# Patient Record
Sex: FEMALE | Race: WHITE | NOT HISPANIC OR LATINO | Employment: FULL TIME | URBAN - METROPOLITAN AREA
[De-identification: names, ages, dates, MRNs, and addresses within clinical notes are randomized per-mention and may not be internally consistent; named-entity substitution may affect disease eponyms.]

---

## 2017-01-11 ENCOUNTER — OFFICE VISIT (OUTPATIENT)
Dept: URGENT CARE | Facility: RETAIL CLINIC | Age: 32
End: 2017-01-11
Payer: COMMERCIAL

## 2017-01-11 VITALS — TEMPERATURE: 99 F | DIASTOLIC BLOOD PRESSURE: 88 MMHG | SYSTOLIC BLOOD PRESSURE: 137 MMHG | HEART RATE: 73 BPM

## 2017-01-11 DIAGNOSIS — H69.92 DYSFUNCTION OF EUSTACHIAN TUBE, LEFT: Primary | ICD-10-CM

## 2017-01-11 PROCEDURE — 99202 OFFICE O/P NEW SF 15 MIN: CPT | Performed by: PHYSICIAN ASSISTANT

## 2017-01-11 NOTE — PATIENT INSTRUCTIONS
At pharmacy ask for Generic Sudafed red tablets (decongestant) for congestion.  Start antihistamine daily (zyrtec or claritin)  Apply warm compresses/packs for 15 minutes daily.  Steam treatments or humidifier.  Tylenol or ibuprofen as needed for pain or fever  Please follow up with primary care provider if not improving, worsening or new symptoms

## 2017-01-11 NOTE — PROGRESS NOTES
Chief Complaint   Patient presents with     Otalgia     left ear pain x 3-4 days, no fevers        SUBJECTIVE:  Mary Oakley is a 31 year old female who presents with left ear pain for 3-4 day(s).   Severity: mild   Timing:still present  Additional symptoms include cold sxs, but resolved few weeks ago  History of recurrent otitis: no    Past Medical History   Diagnosis Date     Scoliosis      Lymphoid hyperplasia      Colon     Breast mass      RT  - Fibrocystic - 10 O'clock     Current Outpatient Prescriptions   Medication Sig Dispense Refill     tretinoin (RETIN-A) 0.025 % cream Apply topically At Bedtime Use every night 45 g 11     norgestim-eth estrad triphasic (TRINESSA, 28,) 0.18/0.215/0.25 MG-35 MCG per tablet Take 1 tablet by mouth daily 84 tablet 3      No Known Allergies     History   Smoking status     Never Smoker    Smokeless tobacco     Never Used       ROS:   Review of systems negative except as stated above.    OBJECTIVE:  /88 mmHg  Pulse 73  Temp(Src) 99  F (37.2  C) (Oral)  The right TM is normal: no effusions, no erythema, and normal landmarks     The right auditory canal is normal and without drainage, edema or erythema  The left TM is air/fluid interface  The left auditory canal is normal and without drainage, edema or erythema  Oropharynx exam is normal: no lesions, erythema, adenopathy or exudate.  GENERAL: no acute distress  EYES: conjunctiva clear  NECK: supple, non-tender to palpation, no adenopathy noted    ASSESSMENT:  Dysfunction of eustachian tube, left [H69.82]    PLAN:  At pharmacy, Generic Sudafed red tablets (decongestant) for middle ear pressure/fluid.  Start antihistamine daily (zyrtec or claritin)  Apply warm compresses/packs for 15 minutes daily.  Steam treatments or humidifier.  Tylenol or ibuprofen as needed for pain or fever  Please follow up with primary care provider if not improving, worsening or new symptoms      Shanae Nazario PA-C  [facility]

## 2017-01-11 NOTE — MR AVS SNAPSHOT
After Visit Summary   1/11/2017    Mary Oakley    MRN: [id number]           Patient Information     Date Of Birth          1985        Visit Information        Provider Department      1/11/2017 5:10 PM Shanae Nazario PA-C [facility]        Today's Diagnoses     Dysfunction of eustachian tube, left    -  1       Care Instructions    At pharmacy ask for Generic Sudafed red tablets (decongestant) for congestion.  Start antihistamine daily (zyrtec or claritin)  Apply warm compresses/packs for 15 minutes daily.  Steam treatments or humidifier.  Tylenol or ibuprofen as needed for pain or fever  Please follow up with primary care provider if not improving, worsening or new symptoms          Follow-ups after your visit        Your next 10 appointments already scheduled     Feb 20, 2017  3:45 PM   Return Visit with Rosalina Hirsch MD   [facility] ([facility]    [address] [place] [id number] [phone]              Who to contact     You can reach your care team any time of the day by calling [phone].  Notification of test results:  If you have an abnormal lab result, we will notify you by phone as soon as possible.         Additional Information About Your Visit        MyChart Information     [facility] gives you secure access to your electronic health record. If you see a primary care provider, you can also send messages to your care team and make appointments. If you have questions, please call your primary care clinic.  If you do not have a primary care provider, please call [phone] and they will assist you.        Care EveryWhere ID     This is your Care EveryWhere ID. This could be used by other organizations to access your [place] medical records  [id number]        Your Vitals Were     Pulse Temperature                73 99  F (37.2  C) (Oral)           Blood Pressure from Last 3 Encounters:    01/11/17 137/88   05/09/16 112/76   03/18/16 136/84    Weight from Last 3 Encounters:   05/09/16 152 lb (68.947 kg)   03/18/16 186 lb (84.369 kg)   03/11/16 186 lb (84.369 kg)              Today, you had the following     No orders found for display       Primary Care Provider    None Specified       No primary provider on file.        Thank you!     Thank you for choosing [facility]  for your care. Our goal is always to provide you with excellent care. Hearing back from our patients is one way we can continue to improve our services. Please take a few minutes to complete the written survey that you may receive in the mail after your visit with us. Thank you!             Your Updated Medication List - Protect others around you: Learn how to safely use, store and throw away your medicines at www.disposemymeds.org.          This list is accurate as of: 1/11/17  5:32 PM.  Always use your most recent med list.                   Brand Name Dispense Instructions for use    norgestim-eth estrad triphasic 0.18/0.215/0.25 MG-35 MCG per tablet    TRINESSA (28)    84 tablet    Take 1 tablet by mouth daily       tretinoin 0.025 % cream    RETIN-A    45 g    Apply topically At Bedtime Use every night

## 2017-01-19 NOTE — PROGRESS NOTES
"  SUBJECTIVE:                                                    Mary Oakley is a 31 year old female who presents to clinic today for the following health issues:  {Provider please address medication reconciliation discrepancies--rooming staff please delete if no med/rec issues}    HPI    {additional problems for roomer to add, delete if none:555931}    Problem list and histories reviewed & adjusted, as indicated.  Additional history: {NONE - AS DOCUMENTED:371880::\"as documented\"}    {ACUTE Problem SUPERLIST - brief histories:426837}    {HIST REVIEW/ LINKS 2:529620}    {PROVIDER CHARTING PREFERENCE:524881}  "

## 2017-01-19 NOTE — PROGRESS NOTES
SUBJECTIVE:     CC: Mary Oakley is an 31 year old woman who presents for preventive health visit.     Physical  Annual:     Getting at least 3 servings of Calcium per day::  Yes    Bi-annual eye exam::  NO    Dental care twice a year::  NO    Sleep apnea or symptoms of sleep apnea::  None    Diet::  Regular (no restrictions)    Frequency of exercise::  2-3 days/week    Duration of exercise::  15-30 minutes    Taking medications regularly::  Yes    Medication side effects::  None    Additional concerns today::  YES (anxiety, pain in armpits )        -------------------------------------    Today's PHQ-2 Score:   PHQ-2 ( 1999 Pfizer) 1/13/2016   Q1: Little interest or pleasure in doing things 0   Q2: Feeling down, depressed or hopeless 0   PHQ-2 Score 0       Abuse: Current or Past(Physical, Sexual or Emotional)- No  Do you feel safe in your environment - Yes    Social History   Substance Use Topics     Smoking status: Never Smoker      Smokeless tobacco: Never Used     Alcohol Use: Yes      Comment: Social Drinker     The patient does not drink >3 drinks per day nor >7 drinks per week.    Recent Labs   Lab Test  07/20/15   0901   CHOL  165   HDL  71   LDL  83   TRIG  53   CHOLHDLRATIO  2.3       Reviewed orders with patient.  Reviewed health maintenance and updated orders accordingly - Yes    Mammo Decision Support:  Mammogram not appropriate for this patient based on age.    Pertinent mammograms are reviewed under the imaging tab.  History of abnormal Pap smear: NO - age 30- 65 PAP every 3 years recommended  All Histories reviewed and updated in Epic.    Past Medical History   Diagnosis Date     Scoliosis      Lymphoid hyperplasia      Colon     Breast mass      RT  - Fibrocystic - 10 O'clock      Past Surgical History   Procedure Laterality Date     Colonoscopy  4/02     C nonspecific procedure  as Infant     Trigger Thumb Release     Colonoscopy  4/22/2013     Procedure: COLONOSCOPY;  colonoscopy;  Surgeon:  Moshe Tamez MD;  Location:  GI       ROS:  C: NEGATIVE for fever, chills, change in weight  I: NEGATIVE for worrisome rashes, moles or lesions  E: NEGATIVE for vision changes or irritation  ENT: NEGATIVE for ear, mouth and throat problems  R: NEGATIVE for significant cough or SOB  B: NEGATIVE for masses, tenderness or discharge  CV: NEGATIVE for chest pain, palpitations or peripheral edema  GI: NEGATIVE for nausea, abdominal pain, heartburn, or change in bowel habits  : NEGATIVE for unusual urinary or vaginal symptoms. Periods are regular.  M: NEGATIVE for significant arthralgias or myalgia  N: NEGATIVE for weakness, dizziness or paresthesias  P: NEGATIVE for changes in mood or affect    Problem list, Medication list, Allergies, and Medical/Social/Surgical histories reviewed in [facility] and updated as appropriate.  Labs reviewed in EPIC  BP Readings from Last 3 Encounters:   17 120/60   17 137/88   16 112/76    Wt Readings from Last 3 Encounters:   17 144 lb (65.318 kg)   16 152 lb (68.947 kg)   16 186 lb (84.369 kg)                  Patient Active Problem List   Diagnosis     History of  section     MARIAELENA (generalized anxiety disorder)     Past Surgical History   Procedure Laterality Date     Colonoscopy       C nonspecific procedure  as Infant     Trigger Thumb Release     Colonoscopy  2013     Procedure: COLONOSCOPY;  colonoscopy;  Surgeon: Moshe Tamez MD;  Location:  GI       Social History   Substance Use Topics     Smoking status: Never Smoker      Smokeless tobacco: Never Used     Alcohol Use: Yes      Comment: Social Drinker     Family History   Problem Relation Age of Onset     HEART DISEASE Paternal Grandmother      Cardiovascular Paternal Grandmother      CANCER Paternal Grandmother      skin cancer     Alzheimer Disease Paternal Grandfather      Asthma Brother      Asthma Brother          Current Outpatient Prescriptions   Medication Sig Dispense  "Refill     norgestim-eth estrad triphasic (TRINESSA, 28,) 0.18/0.215/0.25 MG-35 MCG per tablet Take 1 tablet by mouth daily 84 tablet 3     sertraline (ZOLOFT) 50 MG tablet Take 1/2 tablet (25 mg) for 1-2 weeks, then increase to 1 tablet orally daily 30 tablet 1     tretinoin (RETIN-A) 0.025 % cream Apply topically At Bedtime Use every night 45 g 11     [DISCONTINUED] norgestim-eth estrad triphasic (TRINESSA, 28,) 0.18/0.215/0.25 MG-35 MCG per tablet Take 1 tablet by mouth daily 84 tablet 3     No Known Allergies  OBJECTIVE:     There were no vitals taken for this visit.   /60 mmHg  Pulse 80  Temp(Src) 98.5  F (36.9  C) (Temporal)  Resp 16  Ht 5' 7.79\" (1.722 m)  Wt 144 lb (65.318 kg)  BMI 22.03 kg/m2  LMP 01/17/2017    Physical Exam   Constitutional: She is oriented to person, place, and time. She appears well-developed and well-nourished.   HENT:   Head: Normocephalic and atraumatic.   Right Ear: External ear normal.   Left Ear: External ear normal.   Mouth/Throat: Oropharynx is clear and moist.   Eyes: EOM are normal.   Neck: Neck supple.   Cardiovascular: Normal rate and regular rhythm.    Pulmonary/Chest: Effort normal and breath sounds normal.   Abdominal: Soft. Bowel sounds are normal.   Genitourinary: Vagina normal.   Musculoskeletal: Normal range of motion.   Neurological: She is alert and oriented to person, place, and time.   Psychiatric: She has a normal mood and affect.         ASSESSMENT/PLAN:       Problem List Items Addressed This Visit        SPECIALTY PROBLEM LIST    Encounter for initial prescription of contraceptive pills - Primary     She does not have any risk factors to use oral contraceptive pills  Denies any side effects from the medication.  Continue OC pills as prescribed             Relevant Medications    norgestim-eth estrad triphasic (TRINESSA, 28,) 0.18/0.215/0.25 MG-35 MCG per tablet       Other    MARIAELENA (generalized anxiety disorder)     Discussed medication management " "versus therapy.  She would like to try medications.  Trial low-dose of Zoloft.  Recheck in 6-8 weeks.  Advised to continue physical activity to help improve her symptoms           Relevant Medications    sertraline (ZOLOFT) 50 MG tablet      Other Visit Diagnoses     Screening for cervical cancer         Relevant Orders     Pap imaged thin layer screen with HPV - recommended age 30 - 65 years (select HPV order below) (Completed)     HPV High Risk Types DNA Cervical (Completed)     Routine general medical examination at a health care facility         Relevant Orders     Lipid Profile with reflex to direct LDL     Glucose             COUNSELING:  Reviewed preventive health counseling, as reflected in patient instructions       Regular exercise       Healthy diet/nutrition       Vision screening       Hearing screening         reports that she has never smoked. She has never used smokeless tobacco.    Estimated body mass index is 23.44 kg/(m^2) as calculated from the following:    Height as of 1/13/16: 5' 7.5\" (1.715 m).    Weight as of 5/9/16: 152 lb (68.947 kg).       Counseling Resources:  ATP IV Guidelines  Pooled Cohorts Equation Calculator  Breast Cancer Risk Calculator  FRAX Risk Assessment  ICSI Preventive Guidelines  Dietary Guidelines for Americans, 2010  USDA's MyPlate  ASA Prophylaxis  Lung CA Screening    Mildred Mckeon MD  [facility]    "

## 2017-01-24 ENCOUNTER — OFFICE VISIT (OUTPATIENT)
Dept: FAMILY MEDICINE | Facility: OTHER | Age: 32
End: 2017-01-24
Payer: COMMERCIAL

## 2017-01-24 VITALS
DIASTOLIC BLOOD PRESSURE: 60 MMHG | HEART RATE: 80 BPM | RESPIRATION RATE: 16 BRPM | WEIGHT: 144 LBS | SYSTOLIC BLOOD PRESSURE: 120 MMHG | TEMPERATURE: 98.5 F | BODY MASS INDEX: 21.82 KG/M2 | HEIGHT: 68 IN

## 2017-01-24 DIAGNOSIS — F41.1 GAD (GENERALIZED ANXIETY DISORDER): ICD-10-CM

## 2017-01-24 DIAGNOSIS — Z12.4 SCREENING FOR CERVICAL CANCER: ICD-10-CM

## 2017-01-24 DIAGNOSIS — Z00.00 ROUTINE GENERAL MEDICAL EXAMINATION AT A HEALTH CARE FACILITY: ICD-10-CM

## 2017-01-24 DIAGNOSIS — Z30.011 ENCOUNTER FOR INITIAL PRESCRIPTION OF CONTRACEPTIVE PILLS: Primary | ICD-10-CM

## 2017-01-24 PROCEDURE — 87624 HPV HI-RISK TYP POOLED RSLT: CPT | Performed by: FAMILY MEDICINE

## 2017-01-24 PROCEDURE — G0145 SCR C/V CYTO,THINLAYER,RESCR: HCPCS | Performed by: FAMILY MEDICINE

## 2017-01-24 PROCEDURE — 99395 PREV VISIT EST AGE 18-39: CPT | Performed by: FAMILY MEDICINE

## 2017-01-24 RX ORDER — NORGESTIMATE AND ETHINYL ESTRADIOL 7DAYSX3 28
1 KIT ORAL DAILY
Qty: 84 TABLET | Refills: 3 | Status: SHIPPED | OUTPATIENT
Start: 2017-01-24 | End: 2018-01-07

## 2017-01-24 ASSESSMENT — PAIN SCALES - GENERAL: PAINLEVEL: NO PAIN (0)

## 2017-01-24 NOTE — NURSING NOTE
"Chief Complaint   Patient presents with     Physical     Panel Management     height       Initial /60 mmHg  Pulse 80  Temp(Src) 98.5  F (36.9  C) (Temporal)  Resp 16  Ht 5' 7.79\" (1.722 m)  Wt 144 lb (65.318 kg)  BMI 22.03 kg/m2  LMP 01/17/2017 Estimated body mass index is 22.03 kg/(m^2) as calculated from the following:    Height as of this encounter: 5' 7.8\" (1.722 m).    Weight as of this encounter: 144 lb (65.318 kg).  BP completed using cuff size: regular  Beba Daniels CMA    "

## 2017-01-24 NOTE — ASSESSMENT & PLAN NOTE
She does not have any risk factors to use oral contraceptive pills  Denies any side effects from the medication.  Continue OC pills as prescribed

## 2017-01-24 NOTE — ASSESSMENT & PLAN NOTE
Discussed medication management versus therapy.  She would like to try medications.  Trial low-dose of Zoloft.  Recheck in 6-8 weeks.  Advised to continue physical activity to help improve her symptoms

## 2017-01-24 NOTE — MR AVS SNAPSHOT
After Visit Summary   1/24/2017    Mary Oakley    MRN: [id number]           Patient Information     Date Of Birth          1985        Visit Information        Provider Department      1/24/2017 7:40 AM Mildred Mckeon MD [facility]        Today's Diagnoses     Encounter for initial prescription of contraceptive pills    -  1     MARIAELENA (generalized anxiety disorder)         Screening for cervical cancer         Routine general medical examination at a health care facility           Care Instructions      Preventive Health Recommendations  Female Ages 26 - 39  Yearly exam:   See your health care provider every year in order to    Review health changes.     Discuss preventive care.      Review your medicines if you your doctor has prescribed any.    Until age 30: Get a Pap test every three years (more often if you have had an abnormal result).    After age 30: Talk to your doctor about whether you should have a Pap test every 3 years or have a Pap test with HPV screening every 5 years.   You do not need a Pap test if your uterus was removed (hysterectomy) and you have not had cancer.  You should be tested each year for STDs (sexually transmitted diseases), if you're at risk.   Talk to your provider about how often to have your cholesterol checked.  If you are at risk for diabetes, you should have a diabetes test (fasting glucose).  Shots: Get a flu shot each year. Get a tetanus shot every 10 years.   Nutrition:     Eat at least 5 servings of fruits and vegetables each day.    Eat whole-grain bread, whole-wheat pasta and brown rice instead of white grains and rice.    Talk to your provider about Calcium and Vitamin D.     Lifestyle    Exercise at least 150 minutes a week (30 minutes a day, 5 days of the week). This will help you control your weight and prevent disease.    Limit alcohol to one drink per day.    No smoking.     Wear sunscreen to prevent skin cancer.    See your  dentist every six months for an exam and cleaning.            Follow-ups after your visit        Follow-up notes from your care team     Return in about 2 months (around 3/24/2017).      Your next 10 appointments already scheduled     Feb 20, 2017  3:45 PM   [facility]    [address] [place] [id number] [phone]              Future tests that were ordered for you today     Open Future Orders        Priority Expected Expires Ordered    Lipid Profile with reflex to direct LDL Routine  6/24/2017 1/24/2017    Glucose Routine  6/24/2017 1/24/2017            Who to contact     If you have questions or need follow up information about today's clinic visit or your schedule please contact [facility] directly at [phone].  Normal or non-critical lab and imaging results will be communicated to you by MyChart, letter or phone within 4 business days after the clinic has received the results. If you do not hear from us within 7 days, please contact the clinic through [facility] or phone. If you have a critical or abnormal lab result, we will notify you by phone as soon as possible.  Submit refill requests through [facility] or call your pharmacy and they will forward the refill request to us. Please allow 3 business days for your refill to be completed.          Additional Information About Your Visit        MyChart Information     [facility] gives you secure access to your electronic health record. If you see a primary care provider, you can also send messages to your care team and make appointments. If you have questions, please call your primary care clinic.  If you do not have a primary care provider, please call [phone] and they will assist you.        Care EveryWhere ID     This is your Care EveryWhere ID. This could be used by other organizations to access your [facility]  "records  [id number]        Your Vitals Were     Pulse Temperature Respirations Height BMI (Body Mass Index) Last Period    80 98.5  F (36.9  C) (Temporal) 16 5' 7.79\" (1.722 m) 22.03 kg/m2 01/17/2017       Blood Pressure from Last 3 Encounters:   01/24/17 120/60   01/11/17 137/88   05/09/16 112/76    Weight from Last 3 Encounters:   01/24/17 144 lb (65.318 kg)   05/09/16 152 lb (68.947 kg)   03/18/16 186 lb (84.369 kg)              We Performed the Following     HPV High Risk Types DNA Cervical     Pap imaged thin layer screen with HPV - recommended age 30 - 65 years (select HPV order below)          Today's Medication Changes          These changes are accurate as of: 1/24/17  8:28 AM.  If you have any questions, ask your nurse or doctor.               Start taking these medicines.        Dose/Directions    sertraline 50 MG tablet   Commonly known as:  ZOLOFT   Used for:  MARIAELENA (generalized anxiety disorder)   Started by:  Mildred Mckeon MD        Take 1/2 tablet (25 mg) for 1-2 weeks, then increase to 1 tablet orally daily   Quantity:  30 tablet   Refills:  1            Where to get your medicines      These medications were sent to [facility] [address] [facility] [address] [place] [facility] [address]  [address] [address] [address]     Phone:  [phone]    - norgestim-eth estrad triphasic 0.18/0.215/0.25 MG-35 MCG per tablet  - sertraline 50 MG tablet             Primary Care Provider    None Specified       No primary provider on file.        Thank you!     Thank you for choosing [facility]  for your care. Our goal is always to provide you with excellent care. Hearing back from our patients is one way we can continue to improve our services. Please take a few minutes to complete the written survey that you may receive in the mail after your visit with us. Thank you!             Your Updated Medication List - Protect others around you: Learn how to safely use, " store and throw away your medicines at www.disposemymeds.org.          This list is accurate as of: 1/24/17  8:28 AM.  Always use your most recent med list.                   Brand Name Dispense Instructions for use    norgestim-eth estrad triphasic 0.18/0.215/0.25 MG-35 MCG per tablet    TRINESSA (28)    84 tablet    Take 1 tablet by mouth daily       sertraline 50 MG tablet    ZOLOFT    30 tablet    Take 1/2 tablet (25 mg) for 1-2 weeks, then increase to 1 tablet orally daily       tretinoin 0.025 % cream    RETIN-A    45 g    Apply topically At Bedtime Use every night

## 2017-01-26 LAB
COPATH REPORT: NORMAL
PAP: NORMAL

## 2017-01-27 LAB
FINAL DIAGNOSIS: NORMAL
HPV HR 12 DNA CVX QL NAA+PROBE: NEGATIVE
HPV16 DNA SPEC QL NAA+PROBE: NEGATIVE
HPV18 DNA SPEC QL NAA+PROBE: NEGATIVE
SPECIMEN DESCRIPTION: NORMAL

## 2017-02-06 ENCOUNTER — MYC MEDICAL ADVICE (OUTPATIENT)
Dept: FAMILY MEDICINE | Facility: OTHER | Age: 32
End: 2017-02-06

## 2017-02-20 ENCOUNTER — OFFICE VISIT (OUTPATIENT)
Dept: DERMATOLOGY | Facility: CLINIC | Age: 32
End: 2017-02-20
Payer: COMMERCIAL

## 2017-02-20 DIAGNOSIS — R22.9 SUBCUTANEOUS NODULE: Primary | ICD-10-CM

## 2017-02-20 PROCEDURE — 11100 HC BIOPSY SKIN/SUBQ/MUC MEM, SINGLE LESION: CPT | Mod: GC | Performed by: DERMATOLOGY

## 2017-02-20 PROCEDURE — 88304 TISSUE EXAM BY PATHOLOGIST: CPT | Performed by: DERMATOLOGY

## 2017-02-20 NOTE — MR AVS SNAPSHOT
After Visit Summary   2/20/2017    Mary Oakley    MRN: [id number]           Patient Information     Date Of Birth          1985        Visit Information        Provider Department      2/20/2017 3:45 PM Rosalina Hirsch MD [facility]        Today's Diagnoses     Subcutaneous nodule    -  1      Care Instructions    Wound Care After a Biopsy    What is a skin biopsy?  A skin biopsy allows the doctor to examine a very small piece of tissue under the microscope to determine the diagnosis and the best treatment for the skin condition. A local anesthetic (numbing medicine)  is injected with a very small needle into the skin area to be tested. A small piece of skin is taken from the area. Sometimes a suture (stitch) is used.     What are the risks of a skin biopsy?  I will experience scar, bleeding, swelling, pain, crusting and redness. I may experience incomplete removal or recurrence. Risks of this procedure are excessive bleeding, bruising, infection, nerve damage, numbness, thick (hypertrophic or keloidal) scar and non-diagnostic biopsy.    How should I care for my wound for the first 24 hours?    Keep the wound dry and covered for 24 hours    If it bleeds, hold direct pressure on the area for 15 minutes. If bleeding does not stop then go to the emergency room    Avoid strenuous exercise the first 1-2 days or as your doctor instructs you    How should I care for the wound after 24 hours?    After 24 hours, remove the bandage    Leave Steri-strips on as long as they stick, you may trim any fraying ends    You may bathe or shower as normal    If steri-strips come off before 7 days:  o Clean the wound twice a day with gentle soap and water  o Do not scrub, be gentle  o Apply white petroleum/Vaseline after cleaning the wound with a cotton swab or a clean finger, and keep the site covered with a Bandaid /bandage. Bandages are not necessary with a scalp biopsy    Avoid strenuous  activity for first 1-2 days    Avoid lakes, rivers, pools, and oceans until the stitches are removed or the site is healed    What should I use to clean my wound?     Cotton-tipped applicators (Qtips )    White petroleum jelly (Vaseline ). Use a clean new container and use Q-tips to apply.    Bandaids   as needed    Gentle soap     How should I care for my wound long term?    SUNSCREEN DAILY    Silicone Scar Strips: find Scar Away strips at [employer] or other pharmacy. Starting 4 weeks after biopsy, apply to wound for 12 hours a day on/12 hours off. Other silicone scar strips and gels are available online, these are ok to try. Use for 3 months for optimal healing.    Do not get your wound dirty    Keep up with wound care for one week or until the area is healed.    A small scab will form and fall off by itself when the area is completely healed. The area will be red and will become pink in color as it heals. Sun protection is very important for how your scar will turn out. Sunscreen with an SPF 30 or greater is recommended once the area is healed.    You should have some soreness but it should be mild and slowly go away over several days. Talk to your doctor about using tylenol for pain,    When should I call my doctor?  If you have increased:     Pain or swelling    Pus or drainage (clear or slightly yellow drainage is ok)    Temperature over 100F    Spreading redness or warmth around wound    When will I hear about my results?  The biopsy results can take 2-3 weeks to come back. The clinic will call you with the results, send you a [employer] message, or have you schedule a follow-up clinic or phone time to discuss the results. Contact our clinics if you do not hear from us in 3 weeks.     Who should I call with questions?    [facility]: [phone]     [facility]: [phone]    For urgent needs outside of business hours call the [facility] at  [phone] and ask for the dermatology resident on call            Follow-ups after your visit        Who to contact     If you have questions or need follow up information about today's clinic visit or your schedule please contact [facility] directly at [phone].  Normal or non-critical lab and imaging results will be communicated to you by [employer], letter or phone within 4 business days after the clinic has received the results. If you do not hear from us within 7 days, please contact the clinic through [employer] or phone. If you have a critical or abnormal lab result, we will notify you by phone as soon as possible.  Submit refill requests through [employer] or call your pharmacy and they will forward the refill request to us. Please allow 3 business days for your refill to be completed.          Additional Information About Your Visit        [employer] Information     [employer] gives you secure access to your electronic health record. If you see a primary care provider, you can also send messages to your care team and make appointments. If you have questions, please call your primary care clinic.  If you do not have a primary care provider, please call [phone] and they will assist you.      [employer] is an electronic gateway that provides easy, online access to your medical records. With [employer], you can request a clinic appointment, read your test results, renew a prescription or communicate with your care team.     To access your existing account, please contact your [facility] or call [phone] for assistance.        Care EveryWhere ID     This is your Care EveryWhere ID. This could be used by other organizations to access your [place] medical records  [id number]        Your Vitals Were     Last Period                   01/17/2017            Blood Pressure from Last 3 Encounters:   01/24/17 120/60   01/11/17 137/88   05/09/16 112/76    Weight from Last 3  Encounters:   01/24/17 65.3 kg (144 lb)   05/09/16 68.9 kg (152 lb)   03/18/16 84.4 kg (186 lb)              Today, you had the following     No orders found for display       Primary Care Provider    None Specified       No primary provider on file.        Thank you!     Thank you for choosing [facility]  for your care. Our goal is always to provide you with excellent care. Hearing back from our patients is one way we can continue to improve our services. Please take a few minutes to complete the written survey that you may receive in the mail after your visit with us. Thank you!             Your Updated Medication List - Protect others around you: Learn how to safely use, store and throw away your medicines at www.disposemymeds.org.          This list is accurate as of: 2/20/17  4:29 PM.  Always use your most recent med list.                   Brand Name Dispense Instructions for use    norgestim-eth estrad triphasic 0.18/0.215/0.25 MG-35 MCG per tablet    TRINESSA (28)    84 tablet    Take 1 tablet by mouth daily       sertraline 50 MG tablet    ZOLOFT    30 tablet    Take 1/2 tablet (25 mg) for 1-2 weeks, then increase to 1 tablet orally daily

## 2017-02-20 NOTE — PATIENT INSTRUCTIONS
Wound Care After a Biopsy    What is a skin biopsy?  A skin biopsy allows the doctor to examine a very small piece of tissue under the microscope to determine the diagnosis and the best treatment for the skin condition. A local anesthetic (numbing medicine)  is injected with a very small needle into the skin area to be tested. A small piece of skin is taken from the area. Sometimes a suture (stitch) is used.     What are the risks of a skin biopsy?  I will experience scar, bleeding, swelling, pain, crusting and redness. I may experience incomplete removal or recurrence. Risks of this procedure are excessive bleeding, bruising, infection, nerve damage, numbness, thick (hypertrophic or keloidal) scar and non-diagnostic biopsy.    How should I care for my wound for the first 24 hours?    Keep the wound dry and covered for 24 hours    If it bleeds, hold direct pressure on the area for 15 minutes. If bleeding does not stop then go to the emergency room    Avoid strenuous exercise the first 1-2 days or as your doctor instructs you    How should I care for the wound after 24 hours?    After 24 hours, remove the bandage    Leave Steri-strips on as long as they stick, you may trim any fraying ends    You may bathe or shower as normal    If steri-strips come off before 7 days:  o Clean the wound twice a day with gentle soap and water  o Do not scrub, be gentle  o Apply white petroleum/Vaseline after cleaning the wound with a cotton swab or a clean finger, and keep the site covered with a Bandaid /bandage. Bandages are not necessary with a scalp biopsy    Avoid strenuous activity for first 1-2 days    Avoid lakes, rivers, pools, and oceans until the stitches are removed or the site is healed    What should I use to clean my wound?     Cotton-tipped applicators (Qtips )    White petroleum jelly (Vaseline ). Use a clean new container and use Q-tips to apply.    Bandaids   as needed    Gentle soap     How should I care for my  wound long term?    SUNSCREEN DAILY    Silicone Scar Strips: find Scar Away strips at [employer] or other pharmacy. Starting 4 weeks after biopsy, apply to wound for 12 hours a day on/12 hours off. Other silicone scar strips and gels are available online, these are ok to try. Use for 3 months for optimal healing.    Do not get your wound dirty    Keep up with wound care for one week or until the area is healed.    A small scab will form and fall off by itself when the area is completely healed. The area will be red and will become pink in color as it heals. Sun protection is very important for how your scar will turn out. Sunscreen with an SPF 30 or greater is recommended once the area is healed.    You should have some soreness but it should be mild and slowly go away over several days. Talk to your doctor about using tylenol for pain,    When should I call my doctor?  If you have increased:     Pain or swelling    Pus or drainage (clear or slightly yellow drainage is ok)    Temperature over 100F    Spreading redness or warmth around wound    When will I hear about my results?  The biopsy results can take 2-3 weeks to come back. The clinic will call you with the results, send you a [employer] message, or have you schedule a follow-up clinic or phone time to discuss the results. Contact our clinics if you do not hear from us in 3 weeks.     Who should I call with questions?    [facility]: [phone]     [facility]: [phone]    For urgent needs outside of business hours call the [facility] at [phone] and ask for the dermatology resident on call

## 2017-02-20 NOTE — PROGRESS NOTES
"[facility] Dermatology Note      Dermatology Problem List:  1. Compound melanocytic nevus with mild atypia, mid back  -s/p biopsy 2016  2. Eczematous dermatitis with fungal elements, right thigh  -s/p terbinafine cream initiated 2015  3. Hypertrophic scar, neck  -s/p ILK  4. NUB, right cheek, s/p Bx 2017  -DDx: favor cyst    Encounter Date: 2017    CC:   Chief Complaint   Patient presents with     RECHECK     right cheek         History of Present Illness:  This 31 year old female presents as a follow-up for a cyst on the right cheek. She was last seen 2016 when the lesion was assessed as a cyst vs follicular tumor vs other, favoring cyst. She was treated with a prescription of topical tretinoin 0.025% cream to be applied thinly to the face at bedtime.     Since then, Mary reports no improvement in her lesion. It does not \"bother\" the patient    Past Medical History:   Patient Active Problem List   Diagnosis     History of  section     MARIAELENA (generalized anxiety disorder)     Encounter for initial prescription of contraceptive pills     Past Medical History   Diagnosis Date     Breast mass      RT  - Fibrocystic - 10 O'clock     Lymphoid hyperplasia      Colon     Scoliosis      Past Surgical History   Procedure Laterality Date     Colonoscopy       C nonspecific procedure  as Infant     Trigger Thumb Release     Colonoscopy  2013     Procedure: COLONOSCOPY;  colonoscopy;  Surgeon: Moshe Tamez MD;  Location: [facility]       Social History:  The patient works as a . The patient uses 3-6 alcoholic beverages per week. The patient denies use of tanning beds.    Family History:  There is no family history of skin cancer.  There is a family history of asthma.  There is no family history of psoriasis, eczema or hay fever.     Medications:  Current Outpatient Prescriptions   Medication Sig Dispense Refill     norgestim-eth estrad triphasic " (TRINESSA, 28,) 0.18/0.215/0.25 MG-35 MCG per tablet Take 1 tablet by mouth daily 84 tablet 3     sertraline (ZOLOFT) 50 MG tablet Take 1/2 tablet (25 mg) for 1-2 weeks, then increase to 1 tablet orally daily (Patient not taking: Reported on 2/20/2017) 30 tablet 1        No Known Allergies      Review of Systems:  -As per HPI  -Constitutional: The patient is generally feeling well.    Physical exam:  LMP 01/17/2017  -GEN: This is a well developed, well-nourished female in no acute distress, in a pleasant mood.    -SKIN: Focused examination of the face was performed.  -Right cheek: 3 x 2 mm shallow subcutaneous nodule with an appreciable central punctum.  -No other lesions of concern on areas examined.     Impression/Plan:    1. 3 x 2 mm shallow subcutaneous nodule with an appreciable central punctum. The differential diagnosis includes acne cyst vs follicular tumor vs other, favor cyst Does not improve with tretinoin:    After discussion of benefits and risks including but not limited to bleeding, infection, scar, incomplete removal, recurrence, and non-diagnostic biopsy, written consent and photographs were obtained. The area was cleaned with isopropyl alcohol. 0.5 mL of 1% lidocaine with epinephrine was injected to obtain adequate anesthesia of the lesion on the right cheek. A 3 mm punch biopsy was performed.  5-0 Fast gut sutures were utilized to approximate the epidermal edges.  White petroleum jelly/VaselineTM and a bandage was applied to the wound.  Explicit verbal and written wound care instructions were provided.  The patient left the Dermatology Clinic in good condition.    Follow-up in 3 months.      Staff Involved:    Scribe(Rachel)/Resident(Manuel)/Staff(Torrey)  Bairon DIAS, am serving as a scribe to document services personally performed by Dr. Rosalina Hirsch MD, based on data collection and the provider's statements to me.      Staff Physician Comments:   I saw and evaluated the patient with the  resident and I agree with the assessment and plan.  I was present for the entire minor procedure and examination.  Provider Disclosure:   I agree with above History, Review of Systems, Physical exam and Plan. I have reviewed the content of the documentation and have edited it as needed. I have personally performed the services documented here and the documentation accurately represents those services and the decisions I have made.     Rosalina Hirsch MD    Department of Dermatology  [facility]

## 2017-02-20 NOTE — LETTER
"    Patient:  Mary Forbes  :   1985  MRN:     [id number]        Ms.Leslie Forbes  [address] [address]        2017    Dear ,    We are writing to inform you of your test results that show a benign cyst, no additional treatment needed. If you have further questions or concerns, please contact the clinic at [phone].      Sincerely,    Albania Hirsch MD    Dermatology Department of Dermatology  [facility]    Resulted Orders   Surgical pathology exam   Result Value Ref Range    Copath Report       Patient Name: MARY FORBES  MR#: [id number]  Specimen #:   Collected: 2017  Received: 2017  Reported: 2017 09:43  Ordering Phy(s): ALBANIA HIRSCH    For improved result formatting, select 'View Enhanced Report Format'  under Linked Documents section.    SPECIMEN(S):  Skin, right cheek    FINAL DIAGNOSIS:  Skin, cheek, right:  - Epidermal cyst - (see description)    I have personally reviewed all specimens and or slides, including the  listed special stains, and used them with my medical judgement to  determine the final diagnosis.    Electronically signed out by:    Zach Johns D.O., [facility]    CLINICAL HISTORY:  The patient is a 31-year-old female.    GROSS:  The specimen is received in formalin with proper patient's  identification, labeled \"skin, right cheek\".  The specimen consists of a  3 mm diameter punch skin biopsy tissue fragment measuring 0.3 cm in  depth.  The biopsy margins are inked in black.  Bisected and entirely  submitted in one  cassette. (Dictated by: Paul De La Vega 2017 10:05  AM)    MICROSCOPIC:  In the dermis, a cyst is present lined by stratified squamous epithelium  in which there are intercellular bridges and a granular layer.  The cyst  contains laminated keratinous material.    CPT Codes:  A: 99715-JL9.T, 29558-UR6.P    TESTING LAB " LOCATION:  [facility], [address]   [address] [phone]    COLLECTION SITE:  Client: [facility]  Location: [place] (B)

## 2017-02-20 NOTE — NURSING NOTE
Dermatology Rooming Note    Mary Oakley's goals for this visit include:   Chief Complaint   Patient presents with     RECHECK     right cheek       Is a scribe okay for this visit:YES    Are records needed for this visit(If yes, obtain release of information): No     Vitals: LMP 01/17/2017    Referring Provider:  No referring provider defined for this encounter.    Yuli Singh LPN

## 2017-02-23 LAB — COPATH REPORT: NORMAL

## 2017-05-18 ENCOUNTER — TELEPHONE (OUTPATIENT)
Dept: FAMILY MEDICINE | Facility: OTHER | Age: 32
End: 2017-05-18

## 2017-05-18 NOTE — TELEPHONE ENCOUNTER
Lm for the patient to return call to the clinic to discuss the below. Will await to hear from patient. Courtney Barr RN, BSN

## 2017-05-18 NOTE — TELEPHONE ENCOUNTER
Pt is scheduled 5/22/17 with RK for sporadic chest pain. Please triage symptoms. Dimple Abel, CMA

## 2017-05-18 NOTE — PROGRESS NOTES
SUBJECTIVE:                                                    Mary Oakley is a 31 year old female who presents to clinic today for the following health issues:      HPI    Answers for HPI/ROS submitted by the patient on 2017   MARIAELENA 7 TOTAL SCORE: 8    CHEST PAIN     Onset: x4 weeks    Description:   Location:  left side  Character: achey  Radiation:   Duration: 10 minutes     Intensity: mild    Progression of Symptoms:  same    Accompanying Signs & Symptoms:  Shortness of breath: no  Sweating: no  Nausea/vomiting: no  Lightheadedness: no  Palpitations: no  Fever/Chills: no  Cough: YES- since March  Heartburn: no    History:   Family history of heart disease no  Tobacco use: no    Precipitating factors:   Worse with exertion: no  Worse with deep breaths :  no  Related to food: no    Alleviating factors:         Therapies Tried and outcome: Haven't tried anything.    Anxiety Follow-Up    Status since last visit: No change    Other associated symptoms:None    Complicating factors:   Significant life event: No   Current substance abuse: None  Depression symptoms: No  MARIAELENA-7 SCORE 2016   Total Score - 8 (mild anxiety)   Total Score 5 -        GAD7       Problem list and histories reviewed & adjusted, as indicated.  Additional history: as documented        Patient Active Problem List   Diagnosis     History of  section     MARIAELENA (generalized anxiety disorder)     Encounter for initial prescription of contraceptive pills     Past Surgical History:   Procedure Laterality Date     C NONSPECIFIC PROCEDURE  as Infant    Trigger Thumb Release     COLONOSCOPY       COLONOSCOPY  2013    Procedure: COLONOSCOPY;  colonoscopy;  Surgeon: Moshe Tamez MD;  Location: [facility]       Social History   Substance Use Topics     Smoking status: Never Smoker     Smokeless tobacco: Never Used     Alcohol use Yes      Comment: Social Drinker     Family History   Problem Relation Age of Onset     HEART DISEASE  "Paternal Grandmother      Cardiovascular Paternal Grandmother      CANCER Paternal Grandmother      skin cancer     Alzheimer Disease Paternal Grandfather      Asthma Brother          Current Outpatient Prescriptions   Medication Sig Dispense Refill     norgestim-eth estrad triphasic (TRINESSA, 28,) 0.18/0.215/0.25 MG-35 MCG per tablet Take 1 tablet by mouth daily 84 tablet 3     sertraline (ZOLOFT) 50 MG tablet Take 1/2 tablet (25 mg) for 1-2 weeks, then increase to 1 tablet orally daily (Patient not taking: Reported on 2/20/2017) 30 tablet 1     No Known Allergies  BP Readings from Last 3 Encounters:   05/22/17 124/78   01/24/17 120/60   01/11/17 137/88    Wt Readings from Last 3 Encounters:   05/22/17 144 lb (65.3 kg)   01/24/17 144 lb (65.3 kg)   05/09/16 152 lb (68.9 kg)                  Labs reviewed in EPIC    ROS:  Constitutional, HEENT, cardiovascular, pulmonary, gi and gu systems are negative, except as otherwise noted.    OBJECTIVE:                                                    /78 (BP Location: Left arm, Patient Position: Chair, Cuff Size: Adult Regular)  Pulse 70  Temp 98.1  F (36.7  C) (Oral)  Resp 14  Ht 5' 7.79\" (1.722 m)  Wt 144 lb (65.3 kg)  SpO2 98%  BMI 22.03 kg/m2  Body mass index is 22.03 kg/(m^2).  Physical Exam   Constitutional: She is oriented to person, place, and time. She appears well-developed and well-nourished.   HENT:   Head: Normocephalic and atraumatic.   Right Ear: External ear normal.   Left Ear: External ear normal.   Mouth/Throat: Oropharynx is clear and moist.   Eyes: EOM are normal.   Neck: Neck supple.   Cardiovascular: Normal rate and regular rhythm.    Pulmonary/Chest: Effort normal and breath sounds normal.   No chest tenderness   Abdominal: Soft. Bowel sounds are normal.   Musculoskeletal: Normal range of motion.   Neurological: She is alert and oriented to person, place, and time.   Psychiatric:   Anxious          Diagnostic Test Results:  none  "     ASSESSMENT/PLAN:                                                      Problem List Items Addressed This Visit     None      Visit Diagnoses     Atypical chest pain    -  Primary    Anxiety           Symptoms are very atypical. No concerns for cariac etiology based on symptoms and risk factors  Reassured  She seems to have generalized anxiety and she has not tried taking zoloft that was prescribed last viist. Advised to try medication to help symptoms  counselled  Mildred Mckeon MD  [facility]

## 2017-05-18 NOTE — TELEPHONE ENCOUNTER
Mary Oakley is a 31 year old female who calls with intermittent chest pain.    NURSING ASSESSMENT:  Description:  Chest pain is daily and spermatic from the center of the chest to and under the left breast, minor. Applying pressure to the left breast decreases the pain. Unknown if this is muscle pain, as she carries her child on this side. Nagging cough for the last few weeks. Dry cough, sounds hoarse. Has a tightening or dull pain able to do normal activities through the pain. Maternal grandfather had  A heart attach in his late 40s. Denies shortness of breath, weak, dizziness, nausea, vomiting, severe pain, health concerns, swelling, redness, fevers, rash.   Onset/duration:  Ongoing for a few weeks  Pain scale (0-10)   2/10 dull pain   LMP/preg/breast feeding:  No LMP recorded.  Last exam/Treatment:  01/24/2017  Allergies: No Known Allergies    NURSING PLAN: Nursing advice to patient to keep scheduled appt    RECOMMENDED DISPOSITION:  Keep scheduled appt  Will comply with recommendation: Yes  If further questions/concerns or if symptoms do not improve, worsen or new symptoms develop, call your PCP or [facility] as soon as possible.    NOTES:  Disposition was determined by the first positive assessment question, therefore all previous assessment questions were negative    Guideline used:  Telephone Triage Protocols for Nurses, Fourth Edition, Mary Garcia  Chest Pain  Nursing Judgment    Courtney Barr RN, BSN

## 2017-05-22 ENCOUNTER — OFFICE VISIT (OUTPATIENT)
Dept: FAMILY MEDICINE | Facility: OTHER | Age: 32
End: 2017-05-22
Payer: COMMERCIAL

## 2017-05-22 VITALS
RESPIRATION RATE: 14 BRPM | WEIGHT: 144 LBS | HEIGHT: 68 IN | DIASTOLIC BLOOD PRESSURE: 78 MMHG | TEMPERATURE: 98.1 F | OXYGEN SATURATION: 98 % | SYSTOLIC BLOOD PRESSURE: 124 MMHG | HEART RATE: 70 BPM | BODY MASS INDEX: 21.82 KG/M2

## 2017-05-22 DIAGNOSIS — R07.89 ATYPICAL CHEST PAIN: Primary | ICD-10-CM

## 2017-05-22 DIAGNOSIS — F41.9 ANXIETY: ICD-10-CM

## 2017-05-22 PROCEDURE — 99213 OFFICE O/P EST LOW 20 MIN: CPT | Performed by: FAMILY MEDICINE

## 2017-05-22 ASSESSMENT — PAIN SCALES - GENERAL: PAINLEVEL: NO PAIN (0)

## 2017-05-22 ASSESSMENT — ANXIETY QUESTIONNAIRES
GAD7 TOTAL SCORE: 8
GAD7 TOTAL SCORE: 8
7. FEELING AFRAID AS IF SOMETHING AWFUL MIGHT HAPPEN: 2 = MORE THAN HALF THE DAYS

## 2017-05-22 NOTE — MR AVS SNAPSHOT
"              After Visit Summary   5/22/2017    Mary Oakley    MRN: [id number]           Patient Information     Date Of Birth          1985        Visit Information        Provider Department      5/22/2017 2:00 PM Mildred Mckeon MD [facility]         Follow-ups after your visit        Who to contact     If you have questions or need follow up information about today's clinic visit or your schedule please contact [facility] directly at [phone].  Normal or non-critical lab and imaging results will be communicated to you by MyChart, letter or phone within 4 business days after the clinic has received the results. If you do not hear from us within 7 days, please contact the clinic through [employer] or phone. If you have a critical or abnormal lab result, we will notify you by phone as soon as possible.  Submit refill requests through [employer] or call your pharmacy and they will forward the refill request to us. Please allow 3 business days for your refill to be completed.          Additional Information About Your Visit        MyChart Information     [employer] gives you secure access to your electronic health record. If you see a primary care provider, you can also send messages to your care team and make appointments. If you have questions, please call your primary care clinic.  If you do not have a primary care provider, please call [phone] and they will assist you.        Care EveryWhere ID     This is your Care EveryWhere ID. This could be used by other organizations to access your [facility] medical records  [id number]        Your Vitals Were     Pulse Temperature Respirations Height Pulse Oximetry BMI (Body Mass Index)    70 98.1  F (36.7  C) (Oral) 14 5' 7.79\" (1.722 m) 98% 22.03 kg/m2       Blood Pressure from Last 3 Encounters:   05/22/17 124/78   01/24/17 120/60   01/11/17 137/88    Weight from Last 3 Encounters:   05/22/17 144 lb (65.3 kg)   01/24/17 144 lb " (65.3 kg)   05/09/16 152 lb (68.9 kg)              Today, you had the following     No orders found for display       Primary Care Provider    None Specified       No primary provider on file.        Thank you!     Thank you for choosing [facility]  for your care. Our goal is always to provide you with excellent care. Hearing back from our patients is one way we can continue to improve our services. Please take a few minutes to complete the written survey that you may receive in the mail after your visit with us. Thank you!             Your Updated Medication List - Protect others around you: Learn how to safely use, store and throw away your medicines at www.disposemymeds.org.          This list is accurate as of: 5/22/17  2:50 PM.  Always use your most recent med list.                   Brand Name Dispense Instructions for use    norgestim-eth estrad triphasic 0.18/0.215/0.25 MG-35 MCG per tablet    TRINESSA (28)    84 tablet    Take 1 tablet by mouth daily       sertraline 50 MG tablet    ZOLOFT    30 tablet    Take 1/2 tablet (25 mg) for 1-2 weeks, then increase to 1 tablet orally daily

## 2017-05-22 NOTE — NURSING NOTE
"Chief Complaint   Patient presents with     Chest Pain     Panel Management     utd       Initial /78 (BP Location: Left arm, Patient Position: Chair, Cuff Size: Adult Regular)  Pulse 70  Temp 98.1  F (36.7  C) (Oral)  Resp 14  Ht 5' 7.79\" (1.722 m)  Wt 144 lb (65.3 kg)  SpO2 98%  BMI 22.03 kg/m2 Estimated body mass index is 22.03 kg/(m^2) as calculated from the following:    Height as of this encounter: 5' 7.79\" (1.722 m).    Weight as of this encounter: 144 lb (65.3 kg).  Medication Reconciliation: complete   Kaykay Peck CMA (AAMA)      "

## 2017-05-23 ASSESSMENT — ANXIETY QUESTIONNAIRES: GAD7 TOTAL SCORE: 8

## 2017-07-22 ENCOUNTER — MYC REFILL (OUTPATIENT)
Dept: FAMILY MEDICINE | Facility: OTHER | Age: 32
End: 2017-07-22

## 2017-07-22 DIAGNOSIS — F41.1 GAD (GENERALIZED ANXIETY DISORDER): ICD-10-CM

## 2017-07-24 NOTE — TELEPHONE ENCOUNTER
SERTRALINE     Last Written Prescription Date: 1/24/17  Last Fill Quantity: 30, # refills: 1  Last Office Visit with [facility] primary care provider:  5/22/17        Last PHQ-9 score on record=   PHQ-9 SCORE 5/9/2016   Total Score -   Total Score 2

## 2017-07-24 NOTE — TELEPHONE ENCOUNTER
Message from MyChart:  Original authorizing provider: Mildred Mckeon MD    Mary Oakley would like a refill of the following medications:  sertraline (ZOLOFT) 50 MG tablet [Mildred Mckeon MD]    Preferred pharmacy: [facility] [address] [place] [id number] [address] AT [facility]  [address]    Comment:

## 2017-07-25 ENCOUNTER — MYC REFILL (OUTPATIENT)
Dept: FAMILY MEDICINE | Facility: OTHER | Age: 32
End: 2017-07-25

## 2017-07-25 DIAGNOSIS — F41.1 GAD (GENERALIZED ANXIETY DISORDER): ICD-10-CM

## 2017-07-25 NOTE — TELEPHONE ENCOUNTER
Zoloft:  Routing refill request to provider for review/approval because:  Appears to be a break in medication - should have been out in March    Marcie Ambriz, RN, BSN

## 2017-07-25 NOTE — TELEPHONE ENCOUNTER
Needs follow-up to discuss how she responded to Zoloft before considering further refills and Help determine  any increase in dosage that is required. Please advise a visit.  Can also give E-visit or telephone visit options

## 2017-07-26 NOTE — TELEPHONE ENCOUNTER
Message from MyChart:  Original authorizing provider: Mildred Mckeon MD    Mary Oakley would like a refill of the following medications:  sertraline (ZOLOFT) 50 MG tablet [Mildred Mckeon MD]    Preferred pharmacy: [facility] [address] [place] [id number] [address] AT [facility] OF  & MA[address]    Comment:

## 2017-07-26 NOTE — TELEPHONE ENCOUNTER
Sertraline (Zoloft) 50 MG     Last Written Prescription Date: 1/24/17  Last Fill Quantity: 30, # refills: 1  Last Office Visit with [facility] primary care provider:  5/22/17        Last PHQ-9 score on record=   PHQ-9 SCORE 5/9/2016   Total Score -   Total Score 2

## 2017-07-26 NOTE — TELEPHONE ENCOUNTER
LM for patient to return phone call to clinic about message below.  Kaykay Peck CMA ([facility])

## 2017-07-27 ENCOUNTER — VIRTUAL VISIT (OUTPATIENT)
Dept: FAMILY MEDICINE | Facility: OTHER | Age: 32
End: 2017-07-27
Payer: COMMERCIAL

## 2017-07-27 DIAGNOSIS — F41.1 GAD (GENERALIZED ANXIETY DISORDER): Primary | ICD-10-CM

## 2017-07-27 PROCEDURE — 99441 ZZC PHYSICIAN TELEPHONE EVALUATION 5-10 MIN: CPT | Performed by: FAMILY MEDICINE

## 2017-07-27 ASSESSMENT — ANXIETY QUESTIONNAIRES
1. FEELING NERVOUS, ANXIOUS, OR ON EDGE: SEVERAL DAYS
3. WORRYING TOO MUCH ABOUT DIFFERENT THINGS: SEVERAL DAYS
6. BECOMING EASILY ANNOYED OR IRRITABLE: NOT AT ALL
5. BEING SO RESTLESS THAT IT IS HARD TO SIT STILL: NOT AT ALL
GAD7 TOTAL SCORE: 3
7. FEELING AFRAID AS IF SOMETHING AWFUL MIGHT HAPPEN: SEVERAL DAYS
IF YOU CHECKED OFF ANY PROBLEMS ON THIS QUESTIONNAIRE, HOW DIFFICULT HAVE THESE PROBLEMS MADE IT FOR YOU TO DO YOUR WORK, TAKE CARE OF THINGS AT HOME, OR GET ALONG WITH OTHER PEOPLE: NOT DIFFICULT AT ALL
2. NOT BEING ABLE TO STOP OR CONTROL WORRYING: NOT AT ALL

## 2017-07-27 ASSESSMENT — PATIENT HEALTH QUESTIONNAIRE - PHQ9: 5. POOR APPETITE OR OVEREATING: NOT AT ALL

## 2017-07-27 NOTE — PROGRESS NOTES
"Mary Oakley is a 31 year old female who is being evaluated via a telephone visit.      The patient has been notified of following:     \"This telephone visit will be conducted via a call between you and your physician/provider. We have found that certain health care needs can be provided without the need for a physical exam.  This service lets us provide the care you need with a short phone conversation.  If a prescription is necessary we can send it directly to your pharmacy.  If lab work is needed we can place an order for that and you can then stop by our lab to have the test done at a later time.    We will bill your insurance company for this service.  Please check with your medical insurance if this type of visit is covered. You may be responsible for the cost of this type of visit if insurance coverage is denied.  The typical cost is $30 (10min), $59 (11-20min) and $85 (21-30min).  Most often these visits are shorter than 10 minutes.    If during the course of the call the physician/provider feels a telephone visit is not appropriate, you will not be charged for this service.\"       Consent has been obtained for this service by 2 care team members: yes. See the scanned image in the medical record.    Mary Oakley complains of  Recheck Medication (Zoloft)      I have reviewed and updated the patient's Past Medical History, Social History, Family History and Medication List.    ALLERGIES  Review of patient's allergies indicates no known allergies.    Kaykay Peck CMA (AAMA)    Additional provider notes:     Assessment/Plan:  No diagnosis found.  Problem List Items Addressed This Visit     MARIAELENA (generalized anxiety disorder) - Primary     Improved symptoms   Has some nausea and headache - not sure if its from medicine   Advised to take medication at night  Good response on GAD7  Continue current dose   Recheck in 6 months or sooner if notices any worsening   Pt agreeable to the above plan         Relevant " Medications    sertraline (ZOLOFT) 50 MG tablet          I have reviewed the note as documented above.  This accurately captures the substance of my conversation with the patient,      Total time of call between patient and provider was 5 minutes

## 2017-07-27 NOTE — ASSESSMENT & PLAN NOTE
Improved symptoms   Has some nausea and headache - not sure if its from medicine   Advised to take medication at night  Good response on GAD7  Continue current dose   Recheck in 6 months or sooner if notices any worsening   Pt agreeable to the above plan

## 2017-07-27 NOTE — MR AVS SNAPSHOT
After Visit Summary   7/27/2017    Mary Oakley    MRN: [id number]           Patient Information     Date Of Birth          1985        Visit Information        Provider Department      7/27/2017 11:40 AM Mildred Mckeon MD [facility]        Today's Diagnoses     MARIAELENA (generalized anxiety disorder)    -  1       Follow-ups after your visit        Who to contact     If you have questions or need follow up information about today's clinic visit or your schedule please contact [facility] directly at [phone].  Normal or non-critical lab and imaging results will be communicated to you by [employer], letter or phone within 4 business days after the clinic has received the results. If you do not hear from us within 7 days, please contact the clinic through [employer] or phone. If you have a critical or abnormal lab result, we will notify you by phone as soon as possible.  Submit refill requests through [employer] or call your pharmacy and they will forward the refill request to us. Please allow 3 business days for your refill to be completed.          Additional Information About Your Visit        MyChart Information     [employer] gives you secure access to your electronic health record. If you see a primary care provider, you can also send messages to your care team and make appointments. If you have questions, please call your primary care clinic.  If you do not have a primary care provider, please call [phone] and they will assist you.        Care EveryWhere ID     This is your Care EveryWhere ID. This could be used by other organizations to access your [facility] medical records  [id number]         Blood Pressure from Last 3 Encounters:   05/22/17 124/78   01/24/17 120/60   01/11/17 137/88    Weight from Last 3 Encounters:   05/22/17 144 lb (65.3 kg)   01/24/17 144 lb (65.3 kg)   05/09/16 152 lb (68.9 kg)              Today, you had the following     No orders found  for display         Today's Medication Changes          These changes are accurate as of: 7/27/17 12:33 PM.  If you have any questions, ask your nurse or doctor.               These medicines have changed or have updated prescriptions.        Dose/Directions    * sertraline 50 MG tablet   Commonly known as:  ZOLOFT   This may have changed:  Another medication with the same name was added. Make sure you understand how and when to take each.   Used for:  MARIAELENA (generalized anxiety disorder)   Changed by:  Mildred Mckeon MD        Take 1/2 tablet (25 mg) for 1-2 weeks, then increase to 1 tablet orally daily   Quantity:  30 tablet   Refills:  1       * sertraline 50 MG tablet   Commonly known as:  ZOLOFT   This may have changed:  You were already taking a medication with the same name, and this prescription was added. Make sure you understand how and when to take each.   Used for:  MARIAELENA (generalized anxiety disorder)   Changed by:  Mildred Mckeon MD        Dose:  50 mg   Take 1 tablet (50 mg) by mouth daily   Quantity:  90 tablet   Refills:  1       * Notice:  This list has 2 medication(s) that are the same as other medications prescribed for you. Read the directions carefully, and ask your doctor or other care provider to review them with you.         Where to get your medicines      These medications were sent to [facility] [address] [facility] [address] [facility] AT [facility] of [facility] [address]  [address] [facility], [facility] [address]     Phone:  [phone]     sertraline 50 MG tablet                Primary Care Provider    None Specified       No primary provider on file.        Equal Access to Services     [facility] AH: Haderica ashraf Soroxana, waaxda luqadaha, qaybta kaalmada chai, harinder varela. So [facility] [phone].    ATENCIÓN: Si habla español, tiene a robbins disposición servicios gratuitos de asistencia lingüística. Llame al [phone].    We comply with  applicable federal civil rights laws and [place] laws. We do not discriminate on the basis of race, color, national origin, age, disability sex, sexual orientation or gender identity.            Thank you!     Thank you for choosing [facility]  for your care. Our goal is always to provide you with excellent care. Hearing back from our patients is one way we can continue to improve our services. Please take a few minutes to complete the written survey that you may receive in the mail after your visit with us. Thank you!             Your Updated Medication List - Protect others around you: Learn how to safely use, store and throw away your medicines at www.disposemymeds.org.          This list is accurate as of: 7/27/17 12:33 PM.  Always use your most recent med list.                   Brand Name Dispense Instructions for use Diagnosis    norgestim-eth estrad triphasic 0.18/0.215/0.25 MG-35 MCG per tablet    TRINESSA (28)    84 tablet    Take 1 tablet by mouth daily    Encounter for initial prescription of contraceptive pills       * sertraline 50 MG tablet    ZOLOFT    30 tablet    Take 1/2 tablet (25 mg) for 1-2 weeks, then increase to 1 tablet orally daily    MARIAELENA (generalized anxiety disorder)       * sertraline 50 MG tablet    ZOLOFT    90 tablet    Take 1 tablet (50 mg) by mouth daily    MARIAELENA (generalized anxiety disorder)       * Notice:  This list has 2 medication(s) that are the same as other medications prescribed for you. Read the directions carefully, and ask your doctor or other care provider to review them with you.

## 2017-07-27 NOTE — TELEPHONE ENCOUNTER
Patient had telephone visit on 7/27/17 with RK.  Medication refilled by provider.  Please remove and close. Thanks.    Kaykay Peck CMA ([facility])

## 2017-07-28 ASSESSMENT — ANXIETY QUESTIONNAIRES: GAD7 TOTAL SCORE: 3

## 2017-11-20 ENCOUNTER — MYC MEDICAL ADVICE (OUTPATIENT)
Dept: FAMILY MEDICINE | Facility: OTHER | Age: 32
End: 2017-11-20

## 2017-11-20 DIAGNOSIS — L65.9 ALOPECIA: Primary | ICD-10-CM

## 2018-01-02 ENCOUNTER — OFFICE VISIT (OUTPATIENT)
Dept: DERMATOLOGY | Facility: CLINIC | Age: 33
End: 2018-01-02
Payer: COMMERCIAL

## 2018-01-02 DIAGNOSIS — D48.5 NEOPLASM OF UNCERTAIN BEHAVIOR OF SKIN: ICD-10-CM

## 2018-01-02 DIAGNOSIS — L65.9 ALOPECIA: Primary | ICD-10-CM

## 2018-01-02 LAB
ERYTHROCYTE [DISTWIDTH] IN BLOOD BY AUTOMATED COUNT: 12.9 % (ref 10–15)
FERRITIN SERPL-MCNC: 73 NG/ML (ref 12–150)
HCT VFR BLD AUTO: 40.5 % (ref 35–47)
HGB BLD-MCNC: 13.1 G/DL (ref 11.7–15.7)
MCH RBC QN AUTO: 28.4 PG (ref 26.5–33)
MCHC RBC AUTO-ENTMCNC: 32.3 G/DL (ref 31.5–36.5)
MCV RBC AUTO: 88 FL (ref 78–100)
PLATELET # BLD AUTO: 247 10E9/L (ref 150–450)
RBC # BLD AUTO: 4.62 10E12/L (ref 3.8–5.2)
RETICS # AUTO: 59.6 10E9/L (ref 25–95)
RETICS/RBC NFR AUTO: 1.3 % (ref 0.5–2)
TSH SERPL DL<=0.005 MIU/L-ACNC: 1.8 MU/L (ref 0.4–4)
WBC # BLD AUTO: 5.2 10E9/L (ref 4–11)

## 2018-01-02 PROCEDURE — 88305 TISSUE EXAM BY PATHOLOGIST: CPT | Performed by: DERMATOLOGY

## 2018-01-02 PROCEDURE — 85045 AUTOMATED RETICULOCYTE COUNT: CPT | Performed by: DERMATOLOGY

## 2018-01-02 PROCEDURE — 99213 OFFICE O/P EST LOW 20 MIN: CPT | Mod: 25 | Performed by: DERMATOLOGY

## 2018-01-02 PROCEDURE — 36415 COLL VENOUS BLD VENIPUNCTURE: CPT | Performed by: DERMATOLOGY

## 2018-01-02 PROCEDURE — 85027 COMPLETE CBC AUTOMATED: CPT | Performed by: DERMATOLOGY

## 2018-01-02 PROCEDURE — 11100 HC BIOPSY SKIN/SUBQ/MUC MEM, SINGLE LESION: CPT | Performed by: DERMATOLOGY

## 2018-01-02 PROCEDURE — 82728 ASSAY OF FERRITIN: CPT | Performed by: DERMATOLOGY

## 2018-01-02 PROCEDURE — 82306 VITAMIN D 25 HYDROXY: CPT | Performed by: DERMATOLOGY

## 2018-01-02 PROCEDURE — 84443 ASSAY THYROID STIM HORMONE: CPT | Performed by: DERMATOLOGY

## 2018-01-02 NOTE — MR AVS SNAPSHOT
After Visit Summary   1/2/2018    Mary Oakley    MRN: [id number]           Patient Information     Date Of Birth          1985        Visit Information        Provider Department      1/2/2018 3:30 PM Tereza Vogel MD [facility]        Today's Diagnoses     Alopecia    -  1    Neoplasm of uncertain behavior of skin          Care Instructions    HALO NEVUS      Topical Rogaine (Minoxidil) for Pattern Hair Loss      Minoxidil is an FDA approved over the counter topical for the treatment of hair loss and thinning hair in men and women.     Initially a 2% solution was available however this required application twice daily. A 5% solution is also now approved, only requiring application once per day.     Available Products:     Rogaine 5% solution: Packaged for men however can be used by men or women. Use dropper and apply directly to scalp at bedtime. This product can cause an allergy because of presence of propylene glycol. Stop this product if you develop a rash or itching and contact your physician.     Rogaine 5% foam: Packaged for men and women: Apply foam directly to the scalp once daily. This is less greasy compared to the solution. This formula is preferred for those who had a reaction to the solution product. If you develop rash or itching, stop the product and contact your physician.     What if I stop minoxidil topical?     After stopping minoxidil the hair will return to the usual pattern of thinning. Using the product 3-4 times per week is better than not using product at all.       Can I use generic minoxidil?     Yes, look for 5% minoxidil.     What are the side effects?    The most common side effect is rash or itching of the scalp. This can occur if a contact allergy develops with propylene glycol. A small group of patients noticed the appearance of facial hair if the product runs onto the face or with prolonged use.       Last updated: 6/26/2016        Wound Care  After a Biopsy    What is a skin biopsy?  A skin biopsy allows the doctor to examine a very small piece of tissue under the microscope to determine the diagnosis and the best treatment for the skin condition. A local anesthetic (numbing medicine)  is injected with a very small needle into the skin area to be tested. A small piece of skin is taken from the area. Sometimes a suture (stitch) is used.     What are the risks of a skin biopsy?  I will experience scar, bleeding, swelling, pain, crusting and redness. I may experience incomplete removal or recurrence. Risks of this procedure are excessive bleeding, bruising, infection, nerve damage, numbness, thick (hypertrophic or keloidal) scar and non-diagnostic biopsy.    How should I care for my wound for the first 24 hours?    Keep the wound dry and covered for 24 hours    If it bleeds, hold direct pressure on the area for 15 minutes. If bleeding does not stop then go to the emergency room    Avoid strenuous exercise the first 1-2 days or as your doctor instructs you    How should I care for the wound after 24 hours?    After 24 hours, remove the bandage    You may bathe or shower as normal    If you had a scalp biopsy, you can shampoo as usual and can use shower water to clean the biopsy site daily    Clean the wound twice a day with gentle soap and water    Do not scrub, be gentle    Apply white petroleum/Vaseline after cleaning the wound with a cotton swab or a clean finger, and keep the site covered with a Bandaid /bandage. Bandages are not necessary with a scalp biopsy    If you are unable to cover the site with a Bandaid /bandage, re-apply ointment 2-3 times a day to keep the site moist. Moisture will help with healing    Avoid strenuous activity for first 1-2 days    Avoid lakes, rivers, pools, and oceans until the stitches are removed or the site is healed    How do I clean my wound?    Wash hands thoroughly with soap or use hand  before all wound  care    Clean the wound with gentle soap and water    Apply white petroleum/Vaseline  to wound after it is clean    Replace the Bandaid /bandage to keep the wound covered for the first few days or as instructed by your doctor    If you had a scalp biopsy, warm shower water to the area on a daily basis should suffice    What should I use to clean my wound?     Cotton-tipped applicators (Qtips )    White petroleum jelly (Vaseline ). Use a clean new container and use Q-tips to apply.    Bandaids   as needed    Gentle soap     How should I care for my wound long term?    Do not get your wound dirty    Keep up with wound care for one week or until the area is healed.    A small scab will form and fall off by itself when the area is completely healed. The area will be red and will become pink in color as it heals. Sun protection is very important for how your scar will turn out. Sunscreen with an SPF 30 or greater is recommended once the area is healed.    You should have some soreness but it should be mild and slowly go away over several days. Talk to your doctor about using tylenol for pain,    When should I call my doctor?  If you have increased:     Pain or swelling    Pus or drainage (clear or slightly yellow drainage is ok)    Temperature over 100F    Spreading redness or warmth around wound    When will I hear about my results?  The biopsy results can take 2-3 weeks to come back. The clinic will call you with the results, send you a PeopleM[employer] message, or have you schedule a follow-up clinic or phone time to discuss the results. Contact our clinics if you do not hear from us in 3 weeks.     Who should I call with questions?    [facility]: [phone]     [facility]: [phone]    For urgent needs outside of business hours call the [facility] at [phone] and ask for the dermatology resident on call              Follow-ups after your visit         Follow-up notes from your care team     Return in about 3 months (around 4/2/2018) for LAbs today and needs appt with anya and 3 months with rolanda.      Your next 10 appointments already scheduled     Apr 03, 2018  4:15 PM CDT   Return Visit with Tereza Vogel MD   [facility] ([facility]    [address] [address] [place] [id number] [phone]              Who to contact     If you have questions or need follow up information about today's clinic visit or your schedule please contact [facility] directly at [phone].  Normal or non-critical lab and imaging results will be communicated to you by [email or web address], letter or phone within 4 business days after the clinic has received the results. If you do not hear from us within 7 days, please contact the clinic through [email or web address] or phone. If you have a critical or abnormal lab result, we will notify you by phone as soon as possible.  Submit refill requests through Cloudyn or call your pharmacy and they will forward the refill request to us. Please allow 3 business days for your refill to be completed.          Additional Information About Your Visit        [email or web address] Information     Cloudyn gives you secure access to your electronic health record. If you see a primary care provider, you can also send messages to your care team and make appointments. If you have questions, please call your primary care clinic.  If you do not have a primary care provider, please call [phone] and they will assist you.      Cloudyn is an electronic gateway that provides easy, online access to your medical records. With Cloudyn, you can request a clinic appointment, read your test results, renew a prescription or communicate with your care team.     To access your existing account, please contact your [facility] or call [phone] for assistance.        Care EveryWhere ID     This is your Care  EveryWhere ID. This could be used by other organizations to access your [place] medical records  [id number]         Blood Pressure from Last 3 Encounters:   05/22/17 124/78   01/24/17 120/60   01/11/17 137/88    Weight from Last 3 Encounters:   05/22/17 65.3 kg (144 lb)   01/24/17 65.3 kg (144 lb)   05/09/16 68.9 kg (152 lb)              We Performed the Following     BIOPSY SKIN/SUBQ/MUC MEM, SINGLE LESION     CBC WITH PLATELETS     Ferritin     RETICULOCYTE COUNT     Surgical pathology exam     TSH with free T4 reflex     Vitamin D deficiency screening        Primary Care Provider Fax #    Physician No Ref-Primary [phone]       No address on file        Equal Access to Services     FRANCOIS VAN : Kylie Foote, chapito gayle, ronel paula, harinder varela. So [facility] [phone].    ATENCIÓN: Si habla español, tiene a robbins disposición servicios gratuitos de asistencia lingüística. Llame al [phone].    We comply with applicable federal civil rights laws and [place] laws. We do not discriminate on the basis of race, color, national origin, age, disability, sex, sexual orientation, or gender identity.            Thank you!     Thank you for choosing [facility]  for your care. Our goal is always to provide you with excellent care. Hearing back from our patients is one way we can continue to improve our services. Please take a few minutes to complete the written survey that you may receive in the mail after your visit with us. Thank you!             Your Updated Medication List - Protect others around you: Learn how to safely use, store and throw away your medicines at www.disposemymeds.org.          This list is accurate as of: 1/2/18  4:08 PM.  Always use your most recent med list.                   Brand Name Dispense Instructions for use Diagnosis    norgestim-eth estrad triphasic 0.18/0.215/0.25 MG-35 MCG per tablet    TRINESSA  (28)    84 tablet    Take 1 tablet by mouth daily    Encounter for initial prescription of contraceptive pills       sertraline 50 MG tablet    ZOLOFT    90 tablet    Take 1 tablet (50 mg) by mouth daily    MARIAELENA (generalized anxiety disorder)

## 2018-01-02 NOTE — NURSING NOTE
Dermatology Rooming Note    Mary Oakley's goals for this visit include:   Chief Complaint   Patient presents with     RECHECK     yearly skin check - hx of dysplastic nevus - no lesions of concern     Derm Problem     Hairloss - Patient states that she has had a lot of shedding in the last year - worse on the front hairline       Is a scribe okay for this visit: YES    Are records needed for this visit(If yes, obtain release of information): NO     Vitals: There were no vitals taken for this visit.    Referring Provider:  Referred Self, MD  No address on file    Kamila Steiner CMA

## 2018-01-02 NOTE — PROGRESS NOTES
"[facility] Dermatology Note      Dermatology Problem List:  1. Compound melanocytic nevus with mild atypia, mid back  -s/p biopsy 2016  2. Eczematous dermatitis with fungal elements, right thigh  -s/p terbinafine cream initiated 2015  3. Hypertrophic scar, neck  -s/p ILK  4. NUB, right cheek, s/p Bx 2017  -DDx: favor cyst    Encounter Date: 2018    CC:   Chief Complaint   Patient presents with     RECHECK     yearly skin check - hx of dysplastic nevus - no lesions of concern     Derm Problem     Hairloss - Patient states that she has had a lot of shedding in the last year - worse on the front hairline         History of Present Illness:  This 32 year old female presents as a follow-up for a cyst on the right cheek. She was last seen 2017 for punch removal of lesion on face. Hair loss noted as she feels like she is catching more strands Since 2 years, last baby was 2 years ago. She eats meat/protein. The patient had increased hair loss after having baby, but this never resolved though it has been almost 2 years. No changes in lashes, nails,     Since then, Mary reports no improvement in her lesion. It does not \"bother\" the patient    Past Medical History:   Patient Active Problem List   Diagnosis     History of  section     MARIAELENA (generalized anxiety disorder)     Encounter for initial prescription of contraceptive pills     Past Medical History:   Diagnosis Date     Breast mass     RT  - Fibrocystic - 10 O'clock     Lymphoid hyperplasia     Colon     Scoliosis      Past Surgical History:   Procedure Laterality Date     C NONSPECIFIC PROCEDURE  as Infant    Trigger Thumb Release     COLONOSCOPY       COLONOSCOPY  2013    Procedure: COLONOSCOPY;  colonoscopy;  Surgeon: Moshe Tamez MD;  Location:  GI       Social History:  The patient works as a . The patient uses 3-6 alcoholic beverages per week. The patient denies use of tanning " beds.    Family History:  No significant early hair thinning in parents  There is no family history of skin cancer.  There is a family history of asthma.  There is no family history of psoriasis, eczema or hay fever.     Medications:  Current Outpatient Prescriptions   Medication Sig Dispense Refill     sertraline (ZOLOFT) 50 MG tablet Take 1 tablet (50 mg) by mouth daily 90 tablet 1     norgestim-eth estrad triphasic (TRINESSA, 28,) 0.18/0.215/0.25 MG-35 MCG per tablet Take 1 tablet by mouth daily 84 tablet 3     sertraline (ZOLOFT) 50 MG tablet Take 1/2 tablet (25 mg) for 1-2 weeks, then increase to 1 tablet orally daily (Patient not taking: Reported on 1/2/2018) 30 tablet 1        No Known Allergies      Review of Systems:  -As per HPI  -Constitutional: The patient is generally feeling well. No recent surgeries or illnesses  -Psych- no recent stressful events such as death of loved one or other stressors    Physical exam:  There were no vitals taken for this visit.  -GEN: This is a well developed, well-nourished female in no acute distress, in a pleasant mood.    -SKIN:  Total skin excluding the undergarment areas was performed. The exam included the head/face, neck, both arms, chest, back, abdomen, both legs, digits and/or nails.   -Hair pull test is positive  -2-4 cm of regrowth of frontal scalp       1st layer 2 cm,       2nd layer is 4 cm   -Well healed scar right cheek   -Re pigmentation within the scar on the mid back  - Multiple regular brown pigmented macules and papules are identified on the trunk and extremties.   -Right arm pigmented macule with depigmented halo, 4mm  -No other lesions of concern on areas examined.         Impression/Plan:  1. History of dysplastic nevus: repigmentation on the right back- this was mildly dysplastic. However, given she has a new HALO nevus I would recommend completely removing this atypical one. Rarely halo nevi are triggered by irregular pigmented lesions.       Recommend sunscreens SPF #30 or greater, protective clothing and avoidance of tanning beds.    Follow with Dr. Traylor for excision, especially due to the presence of halo nevus     TSH with free T4    2. Right arm pigmented macule with depigmented halo. Consistent with halo nevus. Will biopsy given lesion is pink    Follow with ophthalmologist for evaluation of ocular moles.     After discussion of benefits and risks including but not limited to bleeding, infection, scar, incomplete removal, recurrence, and non-diagnostic biopsy, written consent and photographs were obtained. The area was cleaned with isopropyl alcohol. 0.5 mL of 1% lidocaine with epinephrine was injected to obtain adequate anesthesia of the lesion on the right arm. A shave biopsy was performed. Hemostasis was achieved with aluminium chloride. Vaseline and a sterile dressing were applied. The patient tolerated the procedure and no complications were noted. The patient was provided with verbal and written post care instructions.    3. Alopecia, Possible chronic telogen Effluvium but consider diffuse type alopecia areata. Previously taken biotin without improvement. The patient started birth control as well durign the period where her hair loss began.     Start Rogaine once daily and leave for 12 hours before washing. Keep off of face. Stop if pregnant or BF.     Photograph of scalp was obtained for clinical monitoring and inclusion in medical record.    Labs ordered today: Ferritin, TSH, cbc with diff,  Vitamin D.     Follow-up in 3 months, earlier for any increased hair shedding and to recheck nevi      Staff Involved:    Scribe Disclosure:   ARUN, Debby Leon, am serving as a scribe to document services personally performed by Dr. Tereza Vogel, based on data collection and the provider's statements to me.         Provider Disclosure:   The documentation recorded by the scribe accurately reflects the services I personally performed and the  decisions made by me.    Tereza Vogel MD    Department of Dermatology  [facility][note 3]: Phone: [phone], Fax:[phone]  [facility] [place]: Phone: [phone], Fax: [phone]

## 2018-01-02 NOTE — PATIENT INSTRUCTIONS
HALO NEVUS      Topical Rogaine (Minoxidil) for Pattern Hair Loss      Minoxidil is an FDA approved over the counter topical for the treatment of hair loss and thinning hair in men and women.     Initially a 2% solution was available however this required application twice daily. A 5% solution is also now approved, only requiring application once per day.     Available Products:     Rogaine 5% solution: Packaged for men however can be used by men or women. Use dropper and apply directly to scalp at bedtime. This product can cause an allergy because of presence of propylene glycol. Stop this product if you develop a rash or itching and contact your physician.     Rogaine 5% foam: Packaged for men and women: Apply foam directly to the scalp once daily. This is less greasy compared to the solution. This formula is preferred for those who had a reaction to the solution product. If you develop rash or itching, stop the product and contact your physician.     What if I stop minoxidil topical?     After stopping minoxidil the hair will return to the usual pattern of thinning. Using the product 3-4 times per week is better than not using product at all.       Can I use generic minoxidil?     Yes, look for 5% minoxidil.     What are the side effects?    The most common side effect is rash or itching of the scalp. This can occur if a contact allergy develops with propylene glycol. A small group of patients noticed the appearance of facial hair if the product runs onto the face or with prolonged use.       Last updated: 6/26/2016        Wound Care After a Biopsy    What is a skin biopsy?  A skin biopsy allows the doctor to examine a very small piece of tissue under the microscope to determine the diagnosis and the best treatment for the skin condition. A local anesthetic (numbing medicine)  is injected with a very small needle into the skin area to be tested. A small piece of skin is taken from the area. Sometimes a suture  (stitch) is used.     What are the risks of a skin biopsy?  I will experience scar, bleeding, swelling, pain, crusting and redness. I may experience incomplete removal or recurrence. Risks of this procedure are excessive bleeding, bruising, infection, nerve damage, numbness, thick (hypertrophic or keloidal) scar and non-diagnostic biopsy.    How should I care for my wound for the first 24 hours?    Keep the wound dry and covered for 24 hours    If it bleeds, hold direct pressure on the area for 15 minutes. If bleeding does not stop then go to the emergency room    Avoid strenuous exercise the first 1-2 days or as your doctor instructs you    How should I care for the wound after 24 hours?    After 24 hours, remove the bandage    You may bathe or shower as normal    If you had a scalp biopsy, you can shampoo as usual and can use shower water to clean the biopsy site daily    Clean the wound twice a day with gentle soap and water    Do not scrub, be gentle    Apply white petroleum/Vaseline after cleaning the wound with a cotton swab or a clean finger, and keep the site covered with a Bandaid /bandage. Bandages are not necessary with a scalp biopsy    If you are unable to cover the site with a Bandaid /bandage, re-apply ointment 2-3 times a day to keep the site moist. Moisture will help with healing    Avoid strenuous activity for first 1-2 days    Avoid lakes, rivers, pools, and oceans until the stitches are removed or the site is healed    How do I clean my wound?    Wash hands thoroughly with soap or use hand  before all wound care    Clean the wound with gentle soap and water    Apply white petroleum/Vaseline  to wound after it is clean    Replace the Bandaid /bandage to keep the wound covered for the first few days or as instructed by your doctor    If you had a scalp biopsy, warm shower water to the area on a daily basis should suffice    What should I use to clean my wound?     Cotton-tipped applicators  (Qtips )    White petroleum jelly (Vaseline ). Use a clean new container and use Q-tips to apply.    Bandaids   as needed    Gentle soap     How should I care for my wound long term?    Do not get your wound dirty    Keep up with wound care for one week or until the area is healed.    A small scab will form and fall off by itself when the area is completely healed. The area will be red and will become pink in color as it heals. Sun protection is very important for how your scar will turn out. Sunscreen with an SPF 30 or greater is recommended once the area is healed.    You should have some soreness but it should be mild and slowly go away over several days. Talk to your doctor about using tylenol for pain,    When should I call my doctor?  If you have increased:     Pain or swelling    Pus or drainage (clear or slightly yellow drainage is ok)    Temperature over 100F    Spreading redness or warmth around wound    When will I hear about my results?  The biopsy results can take 2-3 weeks to come back. The clinic will call you with the results, send you a [employer] message, or have you schedule a follow-up clinic or phone time to discuss the results. Contact our clinics if you do not hear from us in 3 weeks.     Who should I call with questions?    [facility]: [phone]     [facility]: [phone]    For urgent needs outside of business hours call the [facility] at [phone] and ask for the dermatology resident on call

## 2018-01-02 NOTE — LETTER
"2018       RE: Mary Oakley  [address]  [facility] [address]     Dear Colleague,    Thank you for referring your patient, Mary Oakley, to the [facility] at [facility]. Please see a copy of my visit note below.    [facility] Dermatology Note      Dermatology Problem List:  1. Compound melanocytic nevus with mild atypia, mid back  -s/p biopsy 2016  2. Eczematous dermatitis with fungal elements, right thigh  -s/p terbinafine cream initiated 2015  3. Hypertrophic scar, neck  -s/p ILK  4. NUB, right cheek, s/p Bx 2017  -DDx: favor cyst    Encounter Date: 2018    CC:   Chief Complaint   Patient presents with     RECHECK     yearly skin check - hx of dysplastic nevus - no lesions of concern     Derm Problem     Hairloss - Patient states that she has had a lot of shedding in the last year - worse on the front hairline         History of Present Illness:  This 32 year old female presents as a follow-up for a cyst on the right cheek. She was last seen 2017 for punch removal of lesion on face. Hair loss noted as she feels like she is catching more strands Since 2 years, last baby was 2 years ago. She eats meat/protein. The patient had increased hair loss after having baby, but this never resolved though it has been almost 2 years. No changes in lashes, nails,     Since then, Mary reports no improvement in her lesion. It does not \"bother\" the patient    Past Medical History:   Patient Active Problem List   Diagnosis     History of  section     MARIAELENA (generalized anxiety disorder)     Encounter for initial prescription of contraceptive pills     Past Medical History:   Diagnosis Date     Breast mass     RT  - Fibrocystic - 10 O'clock     Lymphoid hyperplasia     Colon     Scoliosis      Past Surgical History:   Procedure Laterality Date     C NONSPECIFIC PROCEDURE  as Infant    Trigger Thumb Release     " COLONOSCOPY  4/02     COLONOSCOPY  4/22/2013    Procedure: COLONOSCOPY;  colonoscopy;  Surgeon: Moshe Tamez MD;  Location:  GI       Social History:  The patient works as a . The patient uses 3-6 alcoholic beverages per week. The patient denies use of tanning beds.    Family History:  No significant early hair thinning in parents  There is no family history of skin cancer.  There is a family history of asthma.  There is no family history of psoriasis, eczema or hay fever.     Medications:  Current Outpatient Prescriptions   Medication Sig Dispense Refill     sertraline (ZOLOFT) 50 MG tablet Take 1 tablet (50 mg) by mouth daily 90 tablet 1     norgestim-eth estrad triphasic (TRINESSA, 28,) 0.18/0.215/0.25 MG-35 MCG per tablet Take 1 tablet by mouth daily 84 tablet 3     sertraline (ZOLOFT) 50 MG tablet Take 1/2 tablet (25 mg) for 1-2 weeks, then increase to 1 tablet orally daily (Patient not taking: Reported on 1/2/2018) 30 tablet 1        No Known Allergies      Review of Systems:  -As per HPI  -Constitutional: The patient is generally feeling well. No recent surgeries or illnesses  -Psych- no recent stressful events such as death of loved one or other stressors    Physical exam:  There were no vitals taken for this visit.  -GEN: This is a well developed, well-nourished female in no acute distress, in a pleasant mood.    -SKIN:  Total skin excluding the undergarment areas was performed. The exam included the head/face, neck, both arms, chest, back, abdomen, both legs, digits and/or nails.   -Hair pull test is positive  -2-4 cm of regrowth of frontal scalp       1st layer 2 cm,       2nd layer is 4 cm   -Well healed scar right cheek   -Re pigmentation within the scar on the mid back  - Multiple regular brown pigmented macules and papules are identified on the trunk and extremties.   -Right arm pigmented macule with depigmented halo, 4mm  -No other lesions of concern on areas examined.          Impression/Plan:  1. History of dysplastic nevus: repigmentation on the right back- this was mildly dysplastic. However, given she has a new HALO nevus I would recommend completely removing this atypical one. Rarely halo nevi are triggered by irregular pigmented lesions.      Recommend sunscreens SPF #30 or greater, protective clothing and avoidance of tanning beds.    Follow with Dr. Traylor for excision, especially due to the presence of halo nevus     TSH with free T4    2. Right arm pigmented macule with depigmented halo. Consistent with halo nevus. Will biopsy given lesion is pink    Follow with ophthalmologist for evaluation of ocular moles.     After discussion of benefits and risks including but not limited to bleeding, infection, scar, incomplete removal, recurrence, and non-diagnostic biopsy, written consent and photographs were obtained. The area was cleaned with isopropyl alcohol. 0.5 mL of 1% lidocaine with epinephrine was injected to obtain adequate anesthesia of the lesion on the right arm. A shave biopsy was performed. Hemostasis was achieved with aluminium chloride. Vaseline and a sterile dressing were applied. The patient tolerated the procedure and no complications were noted. The patient was provided with verbal and written post care instructions.    3. Alopecia, Possible chronic telogen Effluvium but consider diffuse type alopecia areata. Previously taken biotin without improvement. The patient started birth control as well durign the period where her hair loss began.     Start Rogaine once daily and leave for 12 hours before washing. Keep off of face. Stop if pregnant or BF.     Photograph of scalp was obtained for clinical monitoring and inclusion in medical record.    Labs ordered today: Ferritin, TSH, cbc with diff,  Vitamin D.     Follow-up in 3 months, earlier for any increased hair shedding and to recheck nevi      Staff Involved:    Scribe Disclosure:   Debby DIAS am  serving as a scribe to document services personally performed by Dr. Tereza Vogel, based on data collection and the provider's statements to me.         Provider Disclosure:   The documentation recorded by the scribe accurately reflects the services I personally performed and the decisions made by me.    Tereza Vogel MD    Department of Dermatology  [facility]: Phone: [phone], Fax:[phone]  [facility]: Phone: [phone], Fax: [phone]        Again, thank you for allowing me to participate in the care of your patient.      Sincerely,    Tereza Vogel MD

## 2018-01-03 ENCOUNTER — MYC MEDICAL ADVICE (OUTPATIENT)
Dept: DERMATOLOGY | Facility: CLINIC | Age: 33
End: 2018-01-03

## 2018-01-03 LAB — DEPRECATED CALCIDIOL+CALCIFEROL SERPL-MC: 37 UG/L (ref 20–75)

## 2018-01-05 LAB — COPATH REPORT: NORMAL

## 2018-01-07 ENCOUNTER — MYC REFILL (OUTPATIENT)
Dept: FAMILY MEDICINE | Facility: OTHER | Age: 33
End: 2018-01-07

## 2018-01-07 DIAGNOSIS — Z30.011 ENCOUNTER FOR INITIAL PRESCRIPTION OF CONTRACEPTIVE PILLS: ICD-10-CM

## 2018-01-08 NOTE — TELEPHONE ENCOUNTER
Message from MyChart:  Original authorizing provider: Mildred Mckeon MD    Mary Oakley would like a refill of the following medications:  norgestim-eth estrad triphasic (TRINESSA, 28,) 0.18/0.215/0.25 MG-35 MCG per tablet [Mildred Mckeon MD]    Preferred pharmacy: [facility] [address] [place] [address] [id number] [facility] [address]    Comment:

## 2018-01-09 RX ORDER — NORGESTIMATE AND ETHINYL ESTRADIOL 7DAYSX3 28
1 KIT ORAL DAILY
Qty: 84 TABLET | Refills: 1 | Status: SHIPPED | OUTPATIENT
Start: 2018-01-09 | End: 2018-08-04

## 2018-01-09 NOTE — TELEPHONE ENCOUNTER
BCP:  Prescription approved per [facility] Refill Protocol.    Marcie Ambriz, RN, BSN

## 2018-01-17 DIAGNOSIS — Z30.011 ENCOUNTER FOR INITIAL PRESCRIPTION OF CONTRACEPTIVE PILLS: ICD-10-CM

## 2018-01-18 ENCOUNTER — MYC MEDICAL ADVICE (OUTPATIENT)
Dept: FAMILY MEDICINE | Facility: OTHER | Age: 33
End: 2018-01-18

## 2018-01-18 NOTE — TELEPHONE ENCOUNTER
She is due for her annual physical. She could have that scheduled to discuss. I would recommend fasting blood sample for cholesterol testing to be able to interpret them correctively. I can place future orders if she wishes to have them done a day prior to the visit

## 2018-01-19 RX ORDER — NORGESTIMATE-ETHINYL ESTRADIOL 7DAYSX3 28
TABLET ORAL
Qty: 84 TABLET | Refills: 0 | OUTPATIENT
Start: 2018-01-19

## 2018-01-30 ENCOUNTER — MYC REFILL (OUTPATIENT)
Dept: FAMILY MEDICINE | Facility: OTHER | Age: 33
End: 2018-01-30

## 2018-01-30 DIAGNOSIS — F41.1 GAD (GENERALIZED ANXIETY DISORDER): ICD-10-CM

## 2018-02-01 NOTE — TELEPHONE ENCOUNTER
Message from MyChart:  Original authorizing provider: Mildred Mckeon MD    Mary Oakley would like a refill of the following medications:  sertraline (ZOLOFT) 50 MG tablet [Mildred Mckeon MD]    Preferred pharmacy: [facility] [address] [place] [address] [id number] [facility] OF  [address]    Comment:    
Zoloft:  Medication is being filled for 1 time refill only due to:  Patient needs to be seen because mood follow up.     Marcie Ambriz, RN, BSN    
done

## 2018-02-26 NOTE — PROGRESS NOTES
SUBJECTIVE:   CC: Mary Oakley is an 32 year old woman who presents for preventive health visit.     Physical   Annual:     Getting at least 3 servings of Calcium per day::  Yes    Bi-annual eye exam::  NO    Dental care twice a year::  Yes    Sleep apnea or symptoms of sleep apnea::  None    Diet::  Regular (no restrictions)    Frequency of exercise::  2-3 days/week    Duration of exercise::  30-45 minutes    Taking medications regularly::  Yes    Medication side effects::  None    Additional concerns today::  YES            Answers for HPI/ROS submitted by the patient on 3/5/2018   PHQ-2 Score: 0  MARIAELENA 7 TOTAL SCORE: 3  If you checked off any problems, how difficult have these problems made it for you to do your work, take care of things at home, or get along with other people?: Not difficult at all  PHQ9 TOTAL SCORE: 1    Patient would also like to talk about her Cholesterol being elevated    Today's PHQ-2 Score:   PHQ-2 (  [employer]) 3/5/2018   Q1: Little interest or pleasure in doing things 0   Q2: Feeling down, depressed or hopeless 0   PHQ-2 Score 0   Q1: Little interest or pleasure in doing things Not at all   Q2: Feeling down, depressed or hopeless Not at all   PHQ-2 Score 0       Abuse: Current or Past(Physical, Sexual or Emotional)- No  Do you feel safe in your environment - Yes    Social History   Substance Use Topics     Smoking status: Never Smoker     Smokeless tobacco: Never Used     Alcohol use Yes      Comment: Social Drinker     Reviewed orders with patient.  Reviewed health maintenance and updated orders accordingly - Yes  Labs reviewed in EPIC  BP Readings from Last 3 Encounters:   18 112/64   17 124/78   17 120/60    Wt Readings from Last 3 Encounters:   18 152 lb (68.9 kg)   17 144 lb (65.3 kg)   17 144 lb (65.3 kg)                  Patient Active Problem List   Diagnosis     History of  section     MARIAELENA (generalized anxiety disorder)      Encounter for initial prescription of contraceptive pills     Past Surgical History:   Procedure Laterality Date     C NONSPECIFIC PROCEDURE  as Infant    Trigger Thumb Release     COLONOSCOPY  4/02     COLONOSCOPY  4/22/2013    Procedure: COLONOSCOPY;  colonoscopy;  Surgeon: Moshe Tamez MD;  Location:  GI       Social History   Substance Use Topics     Smoking status: Never Smoker     Smokeless tobacco: Never Used     Alcohol use Yes      Comment: Social Drinker     Family History   Problem Relation Age of Onset     HEART DISEASE Paternal Grandmother      Cardiovascular Paternal Grandmother      CANCER Paternal Grandmother      skin cancer     Alzheimer Disease Paternal Grandfather      Asthma Brother          Current Outpatient Prescriptions   Medication Sig Dispense Refill     sertraline (ZOLOFT) 50 MG tablet Take 1 tablet (50 mg) by mouth daily 90 tablet 1     norgestim-eth estrad triphasic (TRINESSA, 28,) 0.18/0.215/0.25 MG-35 MCG per tablet Take 1 tablet by mouth daily 84 tablet 1     [DISCONTINUED] sertraline (ZOLOFT) 50 MG tablet Take 1 tablet (50 mg) by mouth daily 30 tablet 0     No Known Allergies  Recent Labs   Lab Test  01/02/18   1610  07/20/15   0901   LDL   --   83   HDL   --   71   TRIG   --   53   TSH  1.80   --         Mammogram not appropriate for this patient based on age.    Pertinent mammograms are reviewed under the imaging tab.  History of abnormal Pap smear: NO - age 30- 65 PAP every 3 years recommended    Reviewed and updated as needed this visit by clinical staff  Tobacco  Allergies  Meds  Problems  Med Hx  Surg Hx  Fam Hx  Soc Hx          Reviewed and updated as needed this visit by Provider  Allergies  Meds  Problems          Past Medical History:   Diagnosis Date     Breast mass     RT  - Fibrocystic - 10 O'clock     Lymphoid hyperplasia     Colon     Scoliosis       Past Surgical History:   Procedure Laterality Date     C NONSPECIFIC PROCEDURE  as Infant    Trigger Thumb  "Release     COLONOSCOPY       COLONOSCOPY  2013    Procedure: COLONOSCOPY;  colonoscopy;  Surgeon: Moshe Tamez MD;  Location:  GI     Obstetric History       T2      L2     SAB0   TAB0   Ectopic0   Multiple0   Live Births2       # Outcome Date GA Lbr Rajat/2nd Weight Sex Delivery Anes PTL Lv   3 Term 16 39w0d  9 lb 5 oz (4.224 kg) F CS-LTranv Spinal N JONATHON      Name: Mariela Saldaña Term 13 41w0d   F CS-LTranv   JONATHON   1 AB 2012 5w0d                 Review of Systems   Constitutional: Negative for chills and fever.   HENT: Negative for congestion, ear pain, hearing loss and sore throat.    Eyes: Negative for pain and visual disturbance.   Respiratory: Negative for cough and shortness of breath.    Cardiovascular: Positive for chest pain. Negative for palpitations and peripheral edema.   Gastrointestinal: Negative for abdominal pain, constipation, diarrhea, heartburn, hematochezia and nausea.   Endocrine: Negative.    Genitourinary: Negative for dysuria, frequency, genital sores, hematuria, pelvic pain, urgency, vaginal bleeding and vaginal discharge.   Musculoskeletal: Negative for arthralgias, joint swelling and myalgias.   Skin: Negative for rash.   Allergic/Immunologic: Negative.    Neurological: Negative for dizziness, weakness, headaches and paresthesias.   Psychiatric/Behavioral: Negative for mood changes. The patient is not nervous/anxious.           OBJECTIVE:   /64 (BP Location: Right arm, Patient Position: Chair, Cuff Size: Adult Regular)  Pulse 72  Temp 97.8  F (36.6  C) (Oral)  Resp 16  Ht 5' 7.7\" (1.72 m)  Wt 152 lb (68.9 kg)  SpO2 100%  BMI 23.32 kg/m2  Physical Exam   Constitutional: She is oriented to person, place, and time. She appears well-developed and well-nourished. No distress.   HENT:   Right Ear: External ear normal.   Left Ear: External ear normal.   Nose: Nose normal.   Mouth/Throat: Oropharynx is clear and moist. No oropharyngeal exudate. "   Eyes: Conjunctivae are normal. Pupils are equal, round, and reactive to light. Right eye exhibits no discharge. Left eye exhibits no discharge.   Neck: Neck supple. No tracheal deviation present. No thyromegaly present.   Cardiovascular: Normal rate, regular rhythm, S1 normal, S2 normal, normal heart sounds and normal pulses.  Exam reveals no S3, no S4 and no friction rub.    No murmur heard.  Pulmonary/Chest: Effort normal and breath sounds normal. No respiratory distress. She has no wheezes. She has no rales.   Abdominal: Soft. Bowel sounds are normal. She exhibits no mass. There is no hepatosplenomegaly. There is no tenderness.   Genitourinary: No breast swelling, tenderness or discharge. Cervix exhibits no motion tenderness and no discharge.   Musculoskeletal: Normal range of motion. She exhibits no edema.   Lymphadenopathy:     She has no cervical adenopathy.   Neurological: She is alert and oriented to person, place, and time. She has normal strength and normal reflexes. She exhibits normal muscle tone.   Skin: Skin is warm and dry. No rash noted.   Psychiatric: She has a normal mood and affect. Judgment and thought content normal. Cognition and memory are normal.         ASSESSMENT/PLAN:     Problem List Items Addressed This Visit        SPECIALTY PROBLEM LIST    Encounter for initial prescription of contraceptive pills     No side effects.  Continue birth control.  Can refill until the next physical            Other    MARIAELENA (generalized anxiety disorder)     Well-controlled symptoms.  Continue Zoloft at the current dose.  Refills provided for the next 6 months.         Relevant Medications    sertraline (ZOLOFT) 50 MG tablet    Other Relevant Orders    Comprehensive metabolic panel (BMP + Alb, Alk Phos, ALT, AST, Total. Bili, TP) (Completed)      Other Visit Diagnoses     Encounter for routine adult health examination without abnormal findings    -  Primary    Need for prophylactic vaccination and  "inoculation against influenza        Screening for lipoid disorders        Relevant Orders    Lipid panel reflex to direct LDL Fasting (Completed)          COUNSELING:  Reviewed preventive health counseling, as reflected in patient instructions       Regular exercise       Healthy diet/nutrition       Vision screening       Hearing screening         reports that she has never smoked. She has never used smokeless tobacco.    Estimated body mass index is 23.32 kg/(m^2) as calculated from the following:    Height as of this encounter: 5' 7.7\" (1.72 m).    Weight as of this encounter: 152 lb (68.9 kg).       Counseling Resources:  ATP IV Guidelines  Pooled Cohorts Equation Calculator  Breast Cancer Risk Calculator  FRAX Risk Assessment  ICSI Preventive Guidelines  Dietary Guidelines for Americans, 2010  USDA's MyPlate  ASA Prophylaxis  Lung CA Screening    Mildred Mckeon MD  [facility]  "

## 2018-02-26 NOTE — PATIENT INSTRUCTIONS
Preventive Health Recommendations  Female Ages 26 - 39  Yearly exam:   See your health care provider every year in order to    Review health changes.     Discuss preventive care.      Review your medicines if you your doctor has prescribed any.    Until age 30: Get a Pap test every three years (more often if you have had an abnormal result).    After age 30: Talk to your doctor about whether you should have a Pap test every 3 years or have a Pap test with HPV screening every 5 years.   You do not need a Pap test if your uterus was removed (hysterectomy) and you have not had cancer.  You should be tested each year for STDs (sexually transmitted diseases), if you're at risk.   Talk to your provider about how often to have your cholesterol checked.  If you are at risk for diabetes, you should have a diabetes test (fasting glucose).  Shots: Get a flu shot each year. Get a tetanus shot every 10 years.   Nutrition:     Eat at least 5 servings of fruits and vegetables each day.    Eat whole-grain bread, whole-wheat pasta and brown rice instead of white grains and rice.    Talk to your provider about Calcium and Vitamin D.     Lifestyle    Exercise at least 150 minutes a week (30 minutes a day, 5 days of the week). This will help you control your weight and prevent disease.    Limit alcohol to one drink per day.    No smoking.     Wear sunscreen to prevent skin cancer.    See your dentist every six months for an exam and cleaning.    Chest Wall Pain: Costochondritis    The chest pain that you have had today is caused by costochondritis. This condition is caused by an inflammation of the cartilage joining your ribs to your breastbone. It is not caused by heart or lung problems. Your healthcare team has made sure that the chest pain you feel is not from a life threatening cause of chest pain such as heart attack, collapsed lung, blood clot in the lung, tear in the aorta, or esophageal rupture. The inflammation may have been  brought on by a blow to the chest, lifting heavy objects, intense exercise, or an illness that made you cough and sneeze a lot. It often occurs during times of emotional stress. It can be painful, but it is not dangerous. It usually goes away in 1 to 2 weeks. But it may happen again. Rarely, a more serious condition may cause symptoms similar to costochondritis. That s why it s important to watch for the warning signs listed below.  Home care  Follow these guidelines when caring for yourself at home:    If you feel that emotional stress is a cause of your condition, try to figure out the sources of that stress. It may not be obvious. Learn ways to deal with the stress in your life. This can include regular exercise, muscle relaxation, meditation, or simply taking time out for yourself.    You may use acetaminophen, ibuprofen, or naproxen to control pain, unless another pain medicine was prescribed. If you have liver or kidney disease or ever had a stomach ulcer, talk with your healthcare provider before using these medicines.    You can also help ease pain by using a hot, wet compress or heating pad. Use this with or without a medicated skin cream that helps relieves pain.    Do stretching exercise as advised by your provider.    Take any prescribed medicines as directed.  Follow-up care  Follow up with your healthcare provider, or as advised, if you do not start to get better in the next 2 days.  When to seek medical advice  Call your healthcare provider right away if any of these occur:    A change in the type of pain. Call if it feels different, becomes more serious, lasts longer, or spreads into your shoulder, arm, neck, jaw, or back.    Shortness of breath or pain gets worse when you breathe    Weakness, dizziness, or fainting    Cough with dark-colored sputum (phlegm) or blood    Abdominal pain    Dark red or black stools    Fever of 100.4 F (38 C) or higher, or as directed by your healthcare provider  Date  Last Reviewed: 12/1/2016 2000-2017 [employer] [address] [place] [address], [place] [address]. All rights reserved. This information is not intended as a substitute for professional medical care. Always follow your healthcare professional's instructions.

## 2018-03-05 ENCOUNTER — OFFICE VISIT (OUTPATIENT)
Dept: FAMILY MEDICINE | Facility: OTHER | Age: 33
End: 2018-03-05
Payer: COMMERCIAL

## 2018-03-05 VITALS
OXYGEN SATURATION: 100 % | WEIGHT: 152 LBS | HEART RATE: 72 BPM | TEMPERATURE: 97.8 F | RESPIRATION RATE: 16 BRPM | SYSTOLIC BLOOD PRESSURE: 112 MMHG | DIASTOLIC BLOOD PRESSURE: 64 MMHG | BODY MASS INDEX: 23.04 KG/M2 | HEIGHT: 68 IN

## 2018-03-05 DIAGNOSIS — Z13.220 SCREENING FOR LIPOID DISORDERS: ICD-10-CM

## 2018-03-05 DIAGNOSIS — Z30.011 ENCOUNTER FOR INITIAL PRESCRIPTION OF CONTRACEPTIVE PILLS: ICD-10-CM

## 2018-03-05 DIAGNOSIS — F41.1 GAD (GENERALIZED ANXIETY DISORDER): ICD-10-CM

## 2018-03-05 DIAGNOSIS — Z23 NEED FOR PROPHYLACTIC VACCINATION AND INOCULATION AGAINST INFLUENZA: ICD-10-CM

## 2018-03-05 DIAGNOSIS — Z00.00 ENCOUNTER FOR ROUTINE ADULT HEALTH EXAMINATION WITHOUT ABNORMAL FINDINGS: Primary | ICD-10-CM

## 2018-03-05 LAB
ALBUMIN SERPL-MCNC: 3.5 G/DL (ref 3.4–5)
ALP SERPL-CCNC: 62 U/L (ref 40–150)
ALT SERPL W P-5'-P-CCNC: 13 U/L (ref 0–50)
ANION GAP SERPL CALCULATED.3IONS-SCNC: 6 MMOL/L (ref 3–14)
AST SERPL W P-5'-P-CCNC: 8 U/L (ref 0–45)
BILIRUB SERPL-MCNC: 0.4 MG/DL (ref 0.2–1.3)
BUN SERPL-MCNC: 16 MG/DL (ref 7–30)
CALCIUM SERPL-MCNC: 8.4 MG/DL (ref 8.5–10.1)
CHLORIDE SERPL-SCNC: 108 MMOL/L (ref 94–109)
CHOLEST SERPL-MCNC: 202 MG/DL
CO2 SERPL-SCNC: 26 MMOL/L (ref 20–32)
CREAT SERPL-MCNC: 0.65 MG/DL (ref 0.52–1.04)
GFR SERPL CREATININE-BSD FRML MDRD: >90 ML/MIN/1.7M2
GLUCOSE SERPL-MCNC: 79 MG/DL (ref 70–99)
HDLC SERPL-MCNC: 79 MG/DL
LDLC SERPL CALC-MCNC: 105 MG/DL
NONHDLC SERPL-MCNC: 123 MG/DL
POTASSIUM SERPL-SCNC: 3.9 MMOL/L (ref 3.4–5.3)
PROT SERPL-MCNC: 7 G/DL (ref 6.8–8.8)
SODIUM SERPL-SCNC: 140 MMOL/L (ref 133–144)
TRIGL SERPL-MCNC: 89 MG/DL

## 2018-03-05 PROCEDURE — 80061 LIPID PANEL: CPT | Performed by: FAMILY MEDICINE

## 2018-03-05 PROCEDURE — 36415 COLL VENOUS BLD VENIPUNCTURE: CPT | Performed by: FAMILY MEDICINE

## 2018-03-05 PROCEDURE — 80053 COMPREHEN METABOLIC PANEL: CPT | Performed by: FAMILY MEDICINE

## 2018-03-05 PROCEDURE — 99395 PREV VISIT EST AGE 18-39: CPT | Performed by: FAMILY MEDICINE

## 2018-03-05 ASSESSMENT — ENCOUNTER SYMPTOMS
NAUSEA: 0
NERVOUS/ANXIOUS: 0
COUGH: 0
PARESTHESIAS: 0
ENDOCRINE NEGATIVE: 1
HEARTBURN: 0
CHILLS: 0
HEMATOCHEZIA: 0
FEVER: 0
ALLERGIC/IMMUNOLOGIC NEGATIVE: 1
ARTHRALGIAS: 0
JOINT SWELLING: 0
DIARRHEA: 0
PALPITATIONS: 0
DYSURIA: 0
DIZZINESS: 0
WEAKNESS: 0
HEADACHES: 0
EYE PAIN: 0
ABDOMINAL PAIN: 0
HEMATURIA: 0
SORE THROAT: 0
SHORTNESS OF BREATH: 0
FREQUENCY: 0
MYALGIAS: 0
CONSTIPATION: 0

## 2018-03-05 ASSESSMENT — ANXIETY QUESTIONNAIRES
7. FEELING AFRAID AS IF SOMETHING AWFUL MIGHT HAPPEN: SEVERAL DAYS
3. WORRYING TOO MUCH ABOUT DIFFERENT THINGS: SEVERAL DAYS
2. NOT BEING ABLE TO STOP OR CONTROL WORRYING: NOT AT ALL
5. BEING SO RESTLESS THAT IT IS HARD TO SIT STILL: NOT AT ALL
GAD7 TOTAL SCORE: 3
6. BECOMING EASILY ANNOYED OR IRRITABLE: NOT AT ALL
GAD7 TOTAL SCORE: 3
1. FEELING NERVOUS, ANXIOUS, OR ON EDGE: SEVERAL DAYS
7. FEELING AFRAID AS IF SOMETHING AWFUL MIGHT HAPPEN: SEVERAL DAYS
GAD7 TOTAL SCORE: 3
4. TROUBLE RELAXING: NOT AT ALL

## 2018-03-05 ASSESSMENT — PATIENT HEALTH QUESTIONNAIRE - PHQ9
10. IF YOU CHECKED OFF ANY PROBLEMS, HOW DIFFICULT HAVE THESE PROBLEMS MADE IT FOR YOU TO DO YOUR WORK, TAKE CARE OF THINGS AT HOME, OR GET ALONG WITH OTHER PEOPLE: NOT DIFFICULT AT ALL
SUM OF ALL RESPONSES TO PHQ QUESTIONS 1-9: 1
SUM OF ALL RESPONSES TO PHQ QUESTIONS 1-9: 1

## 2018-03-05 ASSESSMENT — PAIN SCALES - GENERAL: PAINLEVEL: NO PAIN (0)

## 2018-03-05 NOTE — NURSING NOTE
"Chief Complaint   Patient presents with     Physical     Panel Management     flu, rafa/phq       Initial /64 (BP Location: Right arm, Patient Position: Chair, Cuff Size: Adult Regular)  Pulse 72  Temp 97.8  F (36.6  C) (Oral)  Resp 16  Ht 5' 7.7\" (1.72 m)  Wt 152 lb (68.9 kg)  SpO2 100%  BMI 23.32 kg/m2 Estimated body mass index is 23.32 kg/(m^2) as calculated from the following:    Height as of this encounter: 5' 7.7\" (1.72 m).    Weight as of this encounter: 152 lb (68.9 kg).  Medication Reconciliation: complete   Kaykay Peck CMA (AAMA)      "

## 2018-03-05 NOTE — ASSESSMENT & PLAN NOTE
Well-controlled symptoms.  Continue Zoloft at the current dose.  Refills provided for the next 6 months.

## 2018-03-05 NOTE — MR AVS SNAPSHOT
After Visit Summary   3/5/2018    Mary Oakley    MRN: [id number]           Patient Information     Date Of Birth          1985        Visit Information        Provider Department      3/5/2018 8:00 AM Mildred Mckeon MD [facility]        Today's Diagnoses     Need for prophylactic vaccination and inoculation against influenza    -  1    Screening for lipoid disorders        MARIAELENA (generalized anxiety disorder)          Care Instructions      Preventive Health Recommendations  Female Ages 26 - 39  Yearly exam:   See your health care provider every year in order to    Review health changes.     Discuss preventive care.      Review your medicines if you your doctor has prescribed any.    Until age 30: Get a Pap test every three years (more often if you have had an abnormal result).    After age 30: Talk to your doctor about whether you should have a Pap test every 3 years or have a Pap test with HPV screening every 5 years.   You do not need a Pap test if your uterus was removed (hysterectomy) and you have not had cancer.  You should be tested each year for STDs (sexually transmitted diseases), if you're at risk.   Talk to your provider about how often to have your cholesterol checked.  If you are at risk for diabetes, you should have a diabetes test (fasting glucose).  Shots: Get a flu shot each year. Get a tetanus shot every 10 years.   Nutrition:     Eat at least 5 servings of fruits and vegetables each day.    Eat whole-grain bread, whole-wheat pasta and brown rice instead of white grains and rice.    Talk to your provider about Calcium and Vitamin D.     Lifestyle    Exercise at least 150 minutes a week (30 minutes a day, 5 days of the week). This will help you control your weight and prevent disease.    Limit alcohol to one drink per day.    No smoking.     Wear sunscreen to prevent skin cancer.    See your dentist every six months for an exam and cleaning.    Chest Wall Pain:  Costochondritis    The chest pain that you have had today is caused by costochondritis. This condition is caused by an inflammation of the cartilage joining your ribs to your breastbone. It is not caused by heart or lung problems. Your healthcare team has made sure that the chest pain you feel is not from a life threatening cause of chest pain such as heart attack, collapsed lung, blood clot in the lung, tear in the aorta, or esophageal rupture. The inflammation may have been brought on by a blow to the chest, lifting heavy objects, intense exercise, or an illness that made you cough and sneeze a lot. It often occurs during times of emotional stress. It can be painful, but it is not dangerous. It usually goes away in 1 to 2 weeks. But it may happen again. Rarely, a more serious condition may cause symptoms similar to costochondritis. That s why it s important to watch for the warning signs listed below.  Home care  Follow these guidelines when caring for yourself at home:    If you feel that emotional stress is a cause of your condition, try to figure out the sources of that stress. It may not be obvious. Learn ways to deal with the stress in your life. This can include regular exercise, muscle relaxation, meditation, or simply taking time out for yourself.    You may use acetaminophen, ibuprofen, or naproxen to control pain, unless another pain medicine was prescribed. If you have liver or kidney disease or ever had a stomach ulcer, talk with your healthcare provider before using these medicines.    You can also help ease pain by using a hot, wet compress or heating pad. Use this with or without a medicated skin cream that helps relieves pain.    Do stretching exercise as advised by your provider.    Take any prescribed medicines as directed.  Follow-up care  Follow up with your healthcare provider, or as advised, if you do not start to get better in the next 2 days.  When to seek medical advice  Call your  healthcare provider right away if any of these occur:    A change in the type of pain. Call if it feels different, becomes more serious, lasts longer, or spreads into your shoulder, arm, neck, jaw, or back.    Shortness of breath or pain gets worse when you breathe    Weakness, dizziness, or fainting    Cough with dark-colored sputum (phlegm) or blood    Abdominal pain    Dark red or black stools    Fever of 100.4 F (38 C) or higher, or as directed by your healthcare provider  Date Last Reviewed: 12/1/2016 2000-2017 [employer]. [address] [place] [address]. All rights reserved. This information is not intended as a substitute for professional medical care. Always follow your healthcare professional's instructions.                Follow-ups after your visit        Follow-up notes from your care team     Return in about 1 year (around 3/5/2019).      Your next 10 appointments already scheduled     Apr 03, 2018  4:15 PM CDT   Return Visit with Tereza Vogel MD   [facility]    [address] [place] [id number] [phone]              Who to contact     If you have questions or need follow up information about today's clinic visit or your schedule please contact [facility] directly at [phone].  Normal or non-critical lab and imaging results will be communicated to you by MyChart, letter or phone within 4 business days after the clinic has received the results. If you do not hear from us within 7 days, please contact the clinic through MyChart or phone. If you have a critical or abnormal lab result, we will notify you by phone as soon as possible.  Submit refill requests through [employer] or call your pharmacy and they will forward the refill request to us. Please allow 3 business days for your refill to be completed.          Additional Information About Your Visit        MyChart Information     [employer]  "gives you secure access to your electronic health record. If you see a primary care provider, you can also send messages to your care team and make appointments. If you have questions, please call your primary care clinic.  If you do not have a primary care provider, please call [phone] and they will assist you.        Care EveryWhere ID     This is your Care EveryWhere ID. This could be used by other organizations to access your [place] medical records  [id number]        Your Vitals Were     Pulse Temperature Respirations Height Pulse Oximetry BMI (Body Mass Index)    72 97.8  F (36.6  C) (Oral) 16 5' 7.7\" (1.72 m) 100% 23.32 kg/m2       Blood Pressure from Last 3 Encounters:   03/05/18 112/64   05/22/17 124/78   01/24/17 120/60    Weight from Last 3 Encounters:   03/05/18 152 lb (68.9 kg)   05/22/17 144 lb (65.3 kg)   01/24/17 144 lb (65.3 kg)              We Performed the Following     Comprehensive metabolic panel (BMP + Alb, Alk Phos, ALT, AST, Total. Bili, TP)     Lipid panel reflex to direct LDL Fasting          Where to get your medicines      These medications were sent to [facility] [address] [place] [address] [facility] AT [facility] [address]  [address] [facility] [address]     Phone:  [phone]     sertraline 50 MG tablet          Primary Care Provider Office Phone # Fax #    [facility] [phone]       [address] [place] [address]        Equal Access to Services     [facility] REHAN AH: Hadii antelmo sheth hadasho Soroxana, waaxda luqadaha, qaybta kaalmada harinder paula. So [facility] [phone].    ATENCIÓN: Si habla español, tiene a robbins disposición servicios gratuitos de asistencia lingüística. Llame al [phone].    We comply with applicable federal civil rights laws and [place] laws. We do not discriminate on the basis of race, color, national origin, age, disability, sex, sexual orientation, or " gender identity.            Thank you!     Thank you for choosing [facility]  for your care. Our goal is always to provide you with excellent care. Hearing back from our patients is one way we can continue to improve our services. Please take a few minutes to complete the written survey that you may receive in the mail after your visit with us. Thank you!             Your Updated Medication List - Protect others around you: Learn how to safely use, store and throw away your medicines at www.disposemymeds.org.          This list is accurate as of 3/5/18  8:34 AM.  Always use your most recent med list.                   Brand Name Dispense Instructions for use Diagnosis    norgestim-eth estrad triphasic 0.18/0.215/0.25 MG-35 MCG per tablet    TRINESSA (28)    84 tablet    Take 1 tablet by mouth daily    Encounter for initial prescription of contraceptive pills       sertraline 50 MG tablet    ZOLOFT    90 tablet    Take 1 tablet (50 mg) by mouth daily    MARIAELENA (generalized anxiety disorder)

## 2018-03-06 ASSESSMENT — PATIENT HEALTH QUESTIONNAIRE - PHQ9: SUM OF ALL RESPONSES TO PHQ QUESTIONS 1-9: 1

## 2018-03-06 ASSESSMENT — ANXIETY QUESTIONNAIRES: GAD7 TOTAL SCORE: 3

## 2018-03-07 ENCOUNTER — TELEPHONE (OUTPATIENT)
Dept: FAMILY MEDICINE | Facility: OTHER | Age: 33
End: 2018-03-07

## 2018-03-07 NOTE — TELEPHONE ENCOUNTER
----- Message from Mildred Mckeon MD sent at 3/6/2018  5:08 PM CST -----  Mildly worsened cholesterol levels compared to 2 years ago.  Please advised low-fat diet and regular exercise to help improve this.  Blood chemistries including kidney and liver functions are essentially normal.

## 2018-03-15 ENCOUNTER — TRANSFERRED RECORDS (OUTPATIENT)
Dept: HEALTH INFORMATION MANAGEMENT | Facility: CLINIC | Age: 33
End: 2018-03-15

## 2018-03-20 ENCOUNTER — DOCUMENTATION ONLY (OUTPATIENT)
Dept: DERMATOLOGY | Facility: CLINIC | Age: 33
End: 2018-03-20

## 2018-03-20 NOTE — PROGRESS NOTES
Records received from [facility].  Patient was seen on 3/15/18.  Per Dr. Yunior Garcia, MARC, there is no evidence of choroidal nevus or melanoma at this time.    Will send to Dr. Vogel as an sFYI and have records scanned into the patients chart.    Kamila Steiner CMA

## 2018-04-03 ENCOUNTER — OFFICE VISIT (OUTPATIENT)
Dept: DERMATOLOGY | Facility: CLINIC | Age: 33
End: 2018-04-03
Payer: COMMERCIAL

## 2018-04-03 DIAGNOSIS — D22.9 ATYPICAL NEVUS: ICD-10-CM

## 2018-04-03 DIAGNOSIS — L65.9 ALOPECIA: Primary | ICD-10-CM

## 2018-04-03 PROCEDURE — 99213 OFFICE O/P EST LOW 20 MIN: CPT | Performed by: DERMATOLOGY

## 2018-04-03 NOTE — PROGRESS NOTES
[facility] Dermatology Note      Dermatology Problem List:  1. Halo nevus   -Right arm pigmented macule with depigmented halo s/p biopsy 2018   2. Atypical nevus   -Right mid back with compound melanocytic nevus with mild atypia s/p biopsy 2016   3. Eczematous dermatitis with fungal elements, right thigh  -s/p terbinafine cream initiated 2015  4. Hypertrophic scar, neck  -s/p ILK      Encounter Date: Apr 3, 2018    CC:   Chief Complaint   Patient presents with     Skin Check     Hx of Halo Nevi     Hair Loss     Additional shedding since January visit - Started using Rogaine in the third week in January         History of Present Illness:  This 32 year old female presents as a follow-up for a cyst on the right cheek. She was last seen 2018 when the patient had discussed hair loss. The patient has been using rogaine and parting her hair to apply it. The patient feels last week that she sees shedding increased from before. Amount of hair is same.     The patient has not noted and changes to the nevi.     Past Medical History:   Patient Active Problem List   Diagnosis     History of  section     MARIAELENA (generalized anxiety disorder)     Encounter for initial prescription of contraceptive pills     Past Medical History:   Diagnosis Date     Breast mass     RT  - Fibrocystic - 10 O'clock     Lymphoid hyperplasia     Colon     Scoliosis      Past Surgical History:   Procedure Laterality Date     C NONSPECIFIC PROCEDURE  as Infant    Trigger Thumb Release     COLONOSCOPY       COLONOSCOPY  2013    Procedure: COLONOSCOPY;  colonoscopy;  Surgeon: Moshe Tamez MD;  Location: [facility]       Social History:  The patient works as a .      Family History:     There is no family history of skin cancer.       Medications:  Current Outpatient Prescriptions   Medication Sig Dispense Refill     sertraline (ZOLOFT) 50 MG tablet Take 1 tablet (50 mg) by mouth daily 90  tablet 1     norgestim-eth estrad triphasic (TRINESSA, 28,) 0.18/0.215/0.25 MG-35 MCG per tablet Take 1 tablet by mouth daily 84 tablet 1        No Known Allergies      Review of Systems:  -As per HPI  -Constitutional: The patient is generally feeling well. SHe may have gained 5 pounds.     Physical exam:  There were no vitals taken for this visit.  -GEN: This is a well developed, well-nourished female in no acute distress, in a pleasant mood.    -SKIN:  Total skin excluding the undergarment areas was performed. The exam included the head/face, neck, both arms, chest, back, abdomen, both legs, digits and/or nails.   -Hair pull test is negative.   -1.5 cm of regrowth of frontal scalp   - 6 cm layer seen  -Re pigmentation within the scar on the right mid back (mid back)  -Multiple regular brown pigmented macules and papules are identified on the trunk and extremties.   -No other lesions of concern on areas examined.         Impression/Plan:  1. History of atypical nevus: repigmentation on the right back  With biopsy proven halo nevus on leg. Given halo nevus, recommend complete removal of this lesion    Recommend sunscreens SPF #30 or greater, protective clothing and avoidance of tanning beds.    Dr. Traylor excision pending due to repigmentation.       2. Alopecia, Possible chronic telogen Effluvium but consider diffuse type alopecia areata.  She is having some increased shedding, but also regrowth seen in frontal hairline on exam today.  Pull test now from positive to negative.     Continue Rogaine once daily, okay to go up to twice daily    Consider spironolactone in the future    Consider biopsy at follow up if no improvement     Follow-up in 3 months, earlier for new or changing lesions.       Staff Involved:    Scribe Disclosure:   I, Debby Leon, am serving as a scribe to document services personally performed by Dr. Tereza Vogel, based on data collection and the provider's statements to me.     Provider  Disclosure:   The documentation recorded by the scribe accurately reflects the services I personally performed and the decisions made by me.    Tereza Vogel MD    Department of Dermatology  [facility]: Phone: [phone], Fax:[phone]  [facility]: Phone: [phone], Fax: [phone]

## 2018-04-03 NOTE — NURSING NOTE
Dermatology Rooming Note    Mary Oakley's goals for this visit include:   Chief Complaint   Patient presents with     Skin Check     Hx of Halo Nevi     Hair Loss     Additional shedding since January visit - Started using Rogaine in the third week in January       Is a scribe okay for this visit: YES    Are records needed for this visit(If yes, obtain release of information): NO     Vitals: There were no vitals taken for this visit.    Referring Provider:  No referring provider defined for this encounter.    Kamila Steiner, CMA

## 2018-04-03 NOTE — MR AVS SNAPSHOT
After Visit Summary   4/3/2018    Mary Oakley    MRN: [id number]           Patient Information     Date Of Birth          1985        Visit Information        Provider Department      4/3/2018 4:15 PM Tereza Vogel MD [facility]        Today's Diagnoses     Alopecia    -  1    Atypical nevus           Follow-ups after your visit        Follow-up notes from your care team     Return in about 3 months (around 7/3/2018) for alopecia.      Who to contact     If you have questions or need follow up information about today's clinic visit or your schedule please contact [facility] directly at [phone].  Normal or non-critical lab and imaging results will be communicated to you by MyChart, letter or phone within 4 business days after the clinic has received the results. If you do not hear from us within 7 days, please contact the clinic through [employer] or phone. If you have a critical or abnormal lab result, we will notify you by phone as soon as possible.  Submit refill requests through [employer] or call your pharmacy and they will forward the refill request to us. Please allow 3 business days for your refill to be completed.          Additional Information About Your Visit        MyChart Information     [employer] gives you secure access to your electronic health record. If you see a primary care provider, you can also send messages to your care team and make appointments. If you have questions, please call your primary care clinic.  If you do not have a primary care provider, please call [phone] and they will assist you.      [employer] is an electronic gateway that provides easy, online access to your medical records. With [employer], you can request a clinic appointment, read your test results, renew a prescription or communicate with your care team.     To access your existing account, please contact your [facility] or call  [phone] for assistance.        Care EveryWhere ID     This is your Care EveryWhere ID. This could be used by other organizations to access your [place] medical records  [id number]         Blood Pressure from Last 3 Encounters:   03/05/18 112/64   05/22/17 124/78   01/24/17 120/60    Weight from Last 3 Encounters:   03/05/18 68.9 kg (152 lb)   05/22/17 65.3 kg (144 lb)   01/24/17 65.3 kg (144 lb)              Today, you had the following     No orders found for display       Primary Care Provider Office Phone # Fax #    [facility] [phone]       [address] [facility] [address]        Equal Access to Services     FRANCOIS VAN : Kylie staffordo Soomaali, waaxda luqadaha, qaybta kaalmada adeegyada, harinder varela. So [facility] [phone].    ATENCIÓN: Si habla español, tiene a robbins disposición servicios gratuitos de asistencia lingüística. [facility] [phone].    We comply with applicable federal civil rights laws and [place] laws. We do not discriminate on the basis of race, color, national origin, age, disability, sex, sexual orientation, or gender identity.            Thank you!     Thank you for choosing [facility]  for your care. Our goal is always to provide you with excellent care. Hearing back from our patients is one way we can continue to improve our services. Please take a few minutes to complete the written survey that you may receive in the mail after your visit with us. Thank you!             Your Updated Medication List - Protect others around you: Learn how to safely use, store and throw away your medicines at www.disposemymeds.org.          This list is accurate as of 4/3/18  4:46 PM.  Always use your most recent med list.                   Brand Name Dispense Instructions for use Diagnosis    norgestim-eth estrad triphasic 0.18/0.215/0.25 MG-35 MCG per tablet    TRINESSA (28)    84 tablet    Take 1 tablet by  mouth daily    Encounter for initial prescription of contraceptive pills       sertraline 50 MG tablet    ZOLOFT    90 tablet    Take 1 tablet (50 mg) by mouth daily    MARIAELENA (generalized anxiety disorder)

## 2018-04-03 NOTE — LETTER
4/3/2018         RE: Mary Oakley  [address]  [place] [address]        Dear Colleague,    Thank you for referring your patient, Mary Oakley, to the [facility]. Please see a copy of my visit note below.    [facility] Dermatology Note      Dermatology Problem List:  1. Halo nevus   -Right arm pigmented macule with depigmented halo s/p biopsy 2018   2. Atypical nevus   -Right mid back with compound melanocytic nevus with mild atypia s/p biopsy 2016   3. Eczematous dermatitis with fungal elements, right thigh  -s/p terbinafine cream initiated 2015  4. Hypertrophic scar, neck  -s/p ILK      Encounter Date: Apr 3, 2018    CC:   Chief Complaint   Patient presents with     Skin Check     Hx of Halo Nevi     Hair Loss     Additional shedding since January visit - Started using Rogaine in the third week in January         History of Present Illness:  This 32 year old female presents as a follow-up for a cyst on the right cheek. She was last seen 2018 when the patient had discussed hair loss. The patient has been using rogaine and parting her hair to apply it. The patient feels last week that she sees shedding increased from before. Amount of hair is same.     The patient has not noted and changes to the nevi.     Past Medical History:   Patient Active Problem List   Diagnosis     History of  section     MARIAELENA (generalized anxiety disorder)     Encounter for initial prescription of contraceptive pills     Past Medical History:   Diagnosis Date     Breast mass     RT  - Fibrocystic - 10 O'clock     Lymphoid hyperplasia     Colon     Scoliosis      Past Surgical History:   Procedure Laterality Date     C NONSPECIFIC PROCEDURE  as Infant    Trigger Thumb Release     COLONOSCOPY       COLONOSCOPY  2013    Procedure: COLONOSCOPY;  colonoscopy;  Surgeon: Moshe Tamez MD;  Location: [facility]       Social History:  The patient works as a project  coordinator.      Family History:     There is no family history of skin cancer.       Medications:  Current Outpatient Prescriptions   Medication Sig Dispense Refill     sertraline (ZOLOFT) 50 MG tablet Take 1 tablet (50 mg) by mouth daily 90 tablet 1     norgestim-eth estrad triphasic (TRINESSA, 28,) 0.18/0.215/0.25 MG-35 MCG per tablet Take 1 tablet by mouth daily 84 tablet 1        No Known Allergies      Review of Systems:  -As per HPI  -Constitutional: The patient is generally feeling well. SHe may have gained 5 pounds.     Physical exam:  There were no vitals taken for this visit.  -GEN: This is a well developed, well-nourished female in no acute distress, in a pleasant mood.    -SKIN:  Total skin excluding the undergarment areas was performed. The exam included the head/face, neck, both arms, chest, back, abdomen, both legs, digits and/or nails.   -Hair pull test is negative.   -1.5 cm of regrowth of frontal scalp   - 6 cm layer seen  -Re pigmentation within the scar on the right mid back (mid back)  -Multiple regular brown pigmented macules and papules are identified on the trunk and extremties.   -No other lesions of concern on areas examined.         Impression/Plan:  1. History of atypical nevus: repigmentation on the right back  With biopsy proven halo nevus on leg. Given halo nevus, recommend complete removal of this lesion    Recommend sunscreens SPF #30 or greater, protective clothing and avoidance of tanning beds.    Dr. Traylor excision pending due to repigmentation.       2. Alopecia, Possible chronic telogen Effluvium but consider diffuse type alopecia areata.  She is having some increased shedding, but also regrowth seen in frontal hairline on exam today.  Pull test now from positive to negative.     Continue Rogaine once daily, okay to go up to twice daily    Consider spironolactone in the future    Consider biopsy at follow up if no improvement     Follow-up in 3 months, earlier for new or  changing lesions.       Staff Involved:    Scribe Disclosure:   I, Debby Carolyn, am serving as a scribe to document services personally performed by Dr. Tereza Vogel, based on data collection and the provider's statements to me.     Provider Disclosure:   The documentation recorded by the scribe accurately reflects the services I personally performed and the decisions made by me.    Tereza Vogel MD    Department of Dermatology  [facility]: Phone: [phone], Fax:[phone]  [facility]: Phone: [phone], Fax: [phone]            Again, thank you for allowing me to participate in the care of your patient.        Sincerely,        Tereza Vogel MD

## 2018-05-31 ENCOUNTER — OFFICE VISIT (OUTPATIENT)
Dept: DERMATOLOGY | Facility: CLINIC | Age: 33
End: 2018-05-31
Payer: COMMERCIAL

## 2018-05-31 VITALS
HEIGHT: 68 IN | BODY MASS INDEX: 23.04 KG/M2 | WEIGHT: 152 LBS | SYSTOLIC BLOOD PRESSURE: 131 MMHG | DIASTOLIC BLOOD PRESSURE: 79 MMHG | HEART RATE: 77 BPM

## 2018-05-31 DIAGNOSIS — D22.5 ATYPICAL NEVUS OF BACK: Primary | ICD-10-CM

## 2018-05-31 PROCEDURE — 99207 ZZC NO BILLABLE SERVICE THIS VISIT: CPT | Performed by: DERMATOLOGY

## 2018-05-31 ASSESSMENT — PAIN SCALES - GENERAL: PAINLEVEL: NO PAIN (0)

## 2018-05-31 NOTE — LETTER
2018         RE: Mary Oakley  [address]  [facility] [address]        Dear Colleague,    Thank you for referring your patient, Mary Oakley, to the [facility]. Please see a copy of my visit note below.    [facility] Dermatology Note      Dermatology Problem List:  1. Halo nevus   - R arm, s/p biopsy 2018   2. Atypical nevus   - R mid back, s/p biopsy 2016   3. Eczematous dermatitis with fungal elements, R thigh  - s/p terbinafine cream initiated 2015  4. Hypertrophic scar, neck  - s/p ILK      Encounter Date: May 31, 2018    CC:  Chief Complaint   Patient presents with     Procedure     Excsion - Mid Back - Repigmented atypical nevus         History of Present Illness:  Ms. Mary Oakley is a 32 year old female who presents today for excision of a previously biopsied atypical nevus with recurring pigmentation on the mid back. The patient was last seen by Dr. Vogel on 4/3/18, at which time she was referred to derm surgery for re-excision. The patient has had multiple nevi biopsied and is not able to say with confidence which one was repigmenting and stimulated Dr. Vogel to recommend re-excision. She is okay with scheduling at another time to avoid accidentally excising the wrong lesion. The patient is otherwise feeling well. There are no other skin concerns at this time.      Past Medical History:   Patient Active Problem List   Diagnosis     History of  section     MARIAELENA (generalized anxiety disorder)     Encounter for initial prescription of contraceptive pills     Past Medical History:   Diagnosis Date     Breast mass     RT  - Fibrocystic - 10 O'clock     Lymphoid hyperplasia     Colon     Scoliosis      Past Surgical History:   Procedure Laterality Date     C NONSPECIFIC PROCEDURE  as Infant    Trigger Thumb Release     COLONOSCOPY       COLONOSCOPY  2013    Procedure: COLONOSCOPY;  colonoscopy;  Surgeon: Moshe Tamez MD;   "Location:  GI       Social History:  The patient works as a .     Family History:  There is no family history of skin cancer.     Medications:  Current Outpatient Prescriptions   Medication Sig Dispense Refill     norgestim-eth estrad triphasic (TRINESSA, 28,) 0.18/0.215/0.25 MG-35 MCG per tablet Take 1 tablet by mouth daily 84 tablet 1     sertraline (ZOLOFT) 50 MG tablet Take 1 tablet (50 mg) by mouth daily 90 tablet 1       No Known Allergies    Review of Systems:  -Skin Establ Pt: The patient denies any new rash, pruritus, or lesions that are symptomatic, changing or bleeding, except as per HPI.  -Constitutional: The patient is feeling generally well.    Physical exam:  Vitals: /79 (BP Location: Right arm, Patient Position: Chair, Cuff Size: Adult Regular)  Pulse 77  Ht 1.722 m (5' 7.79\")  Wt 68.9 kg (152 lb)  BMI 23.25 kg/m2  GEN: This is a well developed, well-nourished female in no acute distress, in a pleasant mood.    SKIN: Focused examination of the back was performed.  - pale atrophic macule with central brown pigmentation located on the low back.   - Mid back with several pale atrophic macules, none with obvious repigmentation.    - No other lesions of concern on areas examined.     Impression/Plan:  1. Atypical nevus with recurring pigmentation, mid back     Lesion was unable to be identified with confidence.     Patient will plan to return as scheduled with Dr. Vogel, at which time it can be precisely identified and excision can be performed.     Follow-up as scheduled on 7/12/18 for excision.     Staff Involved:    Scribe Disclosure  I, Enrico Benson, am serving as a scribe to document services personally performed by Dr. Giuliano Traylor DO, based on data collection and the provider's statements to me.     Provider Disclosure:   The documentation recorded by the scribe accurately reflects the services I personally performed and the decisions made by me.    Giuliano" DO [place]    Department of Dermatology  [facility]: Phone: [phone], Fax:[phone]  [facility]: Phone: [phone], Fax: [phone]    Again, thank you for allowing me to participate in the care of your patient.        Sincerely,        Giuliano Traylor MD

## 2018-05-31 NOTE — NURSING NOTE
"Mary Oakley's goals for this visit include:   Chief Complaint   Patient presents with     Procedure     Excsion - Mid Back - Repigmented atypical nevus       She requests these members of at her care team be copied on today's visit    Initial Vitals: /79 (BP Location: Right arm, Patient Position: Chair, Cuff Size: Adult Regular)  Pulse 77  Ht 1.722 m (5' 7.79\")  Wt 68.9 kg (152 lb)  BMI 23.25 kg/m2 Estimated body mass index is 23.25 kg/(m^2) as calculated from the following:    Height as of this encounter: 1.722 m (5' 7.79\").    Weight as of this encounter: 68.9 kg (152 lb).. BP completed using cuff size :regular  PCP: [facility]    Referring Provider  No referring provider defined for this encounter.    Do you need refills at today's visit? ENEDINA Steiner CMA    "

## 2018-05-31 NOTE — PROGRESS NOTES
[id number]    Previously biopsied atypical nevus with recurring pigmentation, mid back     Lesion:  Margin:   Defect:  Repair:  Sutures:

## 2018-05-31 NOTE — MR AVS SNAPSHOT
After Visit Summary   5/31/2018    Mary Oakley    MRN: [id number]           Patient Information     Date Of Birth          1985        Visit Information        Provider Department      5/31/2018 2:00 PM Giuliano Traylor MD [facility]        Today's Diagnoses     Atypical nevus of back    -  1      Care Instructions    Excision Wound Care Instructions  I will experience scar, altered skin color, bleeding, swelling, pain, crusting and redness. I may experience altered sensation. Risks are excessive bleeding, infection, muscle weakness, thick (hypertrophic or keloidal) scar, and recurrence,. A second procedure may be recommended to obtain the best cosmetic or functional result.  Possible complications of any surgical procedure are bleeding, infection, scarring, alteration in skin color and sensation, muscle weakness in the area, wound dehiscence or seperation, or recurrence of the lesion or disease. On occasion, after healing, a secondary procedure or revision may be recommended in order to obtain the best cosmetic or functional result.   After your surgery, a pressure bandage will be placed over the area that has sutures. This will help prevent bleeding. Please follow these instructions, as they will help you to prevent complications as your wound heals.  For the First 48 hours After Surgery:  1. Leave the pressure bandage on and keep it dry. If it should come loose, you may retape it, but do not take it off.  2. Relax and take it easy. Do not do any vigorous exercise, heavy lifting, or bending forward. This could cause the wound to bleed.  3. Post-operative pain is usually mild. You may take plain or extra strength Tylenol every 4 hours as needed (do not take more than 4,000mg in one day). Do not take any medicine that contains aspirin, ibuprofen or motrin unless you have been recommended these by a doctor.  Avoid alcohol and vitamin E as these may increase your tendency to  bleed.  4. You may put an ice pack around the bandaged area for 20 minutes every 2-3 hours. This may help reduce swelling, bruising, and pain. Make sure the ice pack is waterproof so that the pressure bandage does not get wet.   5. You may see a small amount of drainage or blood on your pressure bandage. This is normal. However, if drainage or bleeding continues or saturates the bandage, you will need to apply firm pressure over the bandage with a washcloth for 15 minutes. If bleeding continues after applying pressure for 15 minutes then go to the nearest emergency room.  48 Hours After Surgery  Carefully remove the bandage and start daily wound care and dressing changes. You may also now shower and get the wound wet. Wash wound with a mild soap and water.  Use caution when washing the wound. Be gentle and do not let the forceful shower stream hit the wound directly.  PAT dry.  Daily Wound Care:  1. Wash wound with a mild soap and water.  Use caution when washing the wound, be gentle and do not let the forceful shower stream hit the wound directly.  2. PAT DRY.  3. Apply Vaseline (from a new container or tube) over the suture line with a Q-tip. It is very important to keep the wound continuously moist, as wounds heal best in a moist environment.  4.  Keep the site covered until sutures are removed, you can cover it with a Telfa (non-stick) dressing and tape or a band-aid.    5. If you are unable to keep wound covered, you must apply Vaseline every 2 - 3 hours (while awake) to ensure it is being kept moist for optimal healing. A dressing overnight is recommended to keep the area moist.   Call Us If:  1. You have pain that is not controlled with Tylenol.  2. You have signs or symptoms of an infection, such as: fever over 100 degrees F, redness, warmth, or foul-smelling or yellow/creamy drainage from the wound.  Who should I call with questions?    [facility]: [phone]      [facility]: [phone]    For urgent needs outside of business hours call the [facility] at [phone] and ask for the dermatology resident on call              Follow-ups after your visit        Your next 10 appointments already scheduled     Jul 12, 2018  1:30 PM CDT   Return Visit with Tereza Vogel MD   [facility]    [address] [place] [id number] [phone]            Jul 12, 2018  2:00 PM CDT   PROCEDURE with Giuliano Traylor MD   [facility]    [address] [place] [address] [phone]              Who to contact     If you have questions or need follow up information about today's clinic visit or your schedule please contact [facility] directly at [phone].  Normal or non-critical lab and imaging results will be communicated to you by Apokalyyishart, letter or phone within 4 business days after the clinic has received the results. If you do not hear from us within 7 days, please contact the clinic through Prezmat or phone. If you have a critical or abnormal lab result, we will notify you by phone as soon as possible.  Submit refill requests through StudyApps or call your pharmacy and they will forward the refill request to us. Please allow 3 business days for your refill to be completed.          Additional Information About Your Visit        StudyApps Information     StudyApps gives you secure access to your electronic health record. If you see a primary care provider, you can also send messages to your care team and make appointments. If you have questions, please call your primary care clinic.  If you do not have a primary care provider, please call [phone] and they will assist you.      StudyApps is an electronic gateway that provides easy, online access to your medical records. With StudyApps, you can request a  "clinic appointment, read your test results, renew a prescription or communicate with your care team.     To access your existing account, please contact your [facility] or call [phone] for assistance.        Care EveryWhere ID     This is your Care EveryWhere ID. This could be used by other organizations to access your [facility] medical records  [id number]        Your Vitals Were     Pulse Height BMI (Body Mass Index)             77 1.722 m (5' 7.79\") 23.25 kg/m2          Blood Pressure from Last 3 Encounters:   05/31/18 131/79   03/05/18 112/64   05/22/17 124/78    Weight from Last 3 Encounters:   05/31/18 68.9 kg (152 lb)   03/05/18 68.9 kg (152 lb)   05/22/17 65.3 kg (144 lb)              Today, you had the following     No orders found for display       Primary Care Provider Office Phone # Fax #    [facility] [phone]       [address] [place] [address]        Equal Access to Services     LUZ VAN : Hadii antelmo ku hadasho Soomaali, waaxda luqadaha, qaybta kaalmada adeegyada, harinder guillen haydarshan rain . So [facility] [phone].    ATENCIÓN: Si habla español, tiene a robbins disposición servicios gratuitos de asistencia lingüística. Llame al [phone].    We comply with applicable federal civil rights laws and [place] laws. We do not discriminate on the basis of race, color, national origin, age, disability, sex, sexual orientation, or gender identity.            Thank you!     Thank you for choosing [facility]  for your care. Our goal is always to provide you with excellent care. Hearing back from our patients is one way we can continue to improve our services. Please take a few minutes to complete the written survey that you may receive in the mail after your visit with us. Thank you!             Your Updated Medication List - Protect others around you: Learn how to safely use, store and throw away your " medicines at www.disposemymeds.org.          This list is accurate as of 5/31/18 11:59 PM.  Always use your most recent med list.                   Brand Name Dispense Instructions for use Diagnosis    norgestim-eth estrad triphasic 0.18/0.215/0.25 MG-35 MCG per tablet    TRINESSA (28)    84 tablet    Take 1 tablet by mouth daily    Encounter for initial prescription of contraceptive pills       sertraline 50 MG tablet    ZOLOFT    90 tablet    Take 1 tablet (50 mg) by mouth daily    MARIAELENA (generalized anxiety disorder)

## 2018-05-31 NOTE — PATIENT INSTRUCTIONS

## 2018-05-31 NOTE — PROGRESS NOTES
[facility] Dermatology Note      Dermatology Problem List:  1. Halo nevus   - R arm, s/p biopsy 2018   2. Atypical nevus   - R mid back, s/p biopsy 2016   3. Eczematous dermatitis with fungal elements, R thigh  - s/p terbinafine cream initiated 2015  4. Hypertrophic scar, neck  - s/p ILK      Encounter Date: May 31, 2018    CC:  Chief Complaint   Patient presents with     Procedure     Excsion - Mid Back - Repigmented atypical nevus         History of Present Illness:  Ms. Mary Oakley is a 32 year old female who presents today for excision of a previously biopsied atypical nevus with recurring pigmentation on the mid back. The patient was last seen by Dr. Vogel on 4/3/18, at which time she was referred to derm surgery for re-excision. The patient has had multiple nevi biopsied and is not able to say with confidence which one was repigmenting and stimulated Dr. Vogel to recommend re-excision. She is okay with scheduling at another time to avoid accidentally excising the wrong lesion. The patient is otherwise feeling well. There are no other skin concerns at this time.      Past Medical History:   Patient Active Problem List   Diagnosis     History of  section     MARIAELENA (generalized anxiety disorder)     Encounter for initial prescription of contraceptive pills     Past Medical History:   Diagnosis Date     Breast mass     RT  - Fibrocystic - 10 O'clock     Lymphoid hyperplasia     Colon     Scoliosis      Past Surgical History:   Procedure Laterality Date     C NONSPECIFIC PROCEDURE  as Infant    Trigger Thumb Release     COLONOSCOPY       COLONOSCOPY  2013    Procedure: COLONOSCOPY;  colonoscopy;  Surgeon: Moshe Tamez MD;  Location:  GI       Social History:  The patient works as a .     Family History:  There is no family history of skin cancer.     Medications:  Current Outpatient Prescriptions   Medication Sig Dispense Refill      "norgestim-eth estrad triphasic (TRINESSA, 28,) 0.18/0.215/0.25 MG-35 MCG per tablet Take 1 tablet by mouth daily 84 tablet 1     sertraline (ZOLOFT) 50 MG tablet Take 1 tablet (50 mg) by mouth daily 90 tablet 1       No Known Allergies    Review of Systems:  -Skin Establ Pt: The patient denies any new rash, pruritus, or lesions that are symptomatic, changing or bleeding, except as per HPI.  -Constitutional: The patient is feeling generally well.    Physical exam:  Vitals: /79 (BP Location: Right arm, Patient Position: Chair, Cuff Size: Adult Regular)  Pulse 77  Ht 1.722 m (5' 7.79\")  Wt 68.9 kg (152 lb)  BMI 23.25 kg/m2  GEN: This is a well developed, well-nourished female in no acute distress, in a pleasant mood.    SKIN: Focused examination of the back was performed.  - pale atrophic macule with central brown pigmentation located on the low back.   - Mid back with several pale atrophic macules, none with obvious repigmentation.    - No other lesions of concern on areas examined.     Impression/Plan:  1. Atypical nevus with recurring pigmentation, mid back     Lesion was unable to be identified with confidence.     Patient will plan to return as scheduled with Dr. Vogel, at which time it can be precisely identified and excision can be performed.     Follow-up as scheduled on 7/12/18 for excision.     Staff Involved:    Scribe Disclosure  I, Enrico Benson, am serving as a scribe to document services personally performed by Dr. Giuliano Traylor DO, based on data collection and the provider's statements to me.     Provider Disclosure:   The documentation recorded by the scribe accurately reflects the services I personally performed and the decisions made by me.    Giuliano Traylor DO    Department of Dermatology  [facility]: Phone: [phone], Fax:[phone]  [facility]: Phone: " [phone], Fax: [phone]

## 2018-07-10 ENCOUNTER — TELEPHONE (OUTPATIENT)
Dept: DERMATOLOGY | Facility: CLINIC | Age: 33
End: 2018-07-10

## 2018-07-10 NOTE — TELEPHONE ENCOUNTER
"Left message for patient to call  [employer] in [place] back at [phone]. Patient would like to be scheduled with Dr. Vogel to discuss excision of repigmentation of atypical nevus. Please help schedule with Dr. Vogel to discuss.      Skyla Estrada,           Patient wants to know if this appointment is necessary for  \"Excision - Mid Back - Repigmentation of atypical nevus\" with Dr. Traylor. She said she will discuss with Dr. Vogel and she wants to schedule with her in September however, Dr. Vogle's next opening is 1/17/19. I don't know if you wanted her to wait until then or if you wanted to schedule her sooner with Dr. Vogel.          Thanks,     [facility]           Yuli Milligan LPN    "

## 2018-07-10 NOTE — TELEPHONE ENCOUNTER
Patient was scheduled for follow up with Dr. Vogel and excision with Dr. Bowman on 7/12/18. Patient cancelled these appointments and wants to reschedule for a consult with Dr. Vogel on necessity of Excision. Please add patient on in September and we can schedule for an excision if recommended from there.     Yuli Milligan LPN

## 2018-08-04 DIAGNOSIS — Z30.011 ENCOUNTER FOR INITIAL PRESCRIPTION OF CONTRACEPTIVE PILLS: ICD-10-CM

## 2018-08-06 RX ORDER — NORGESTIMATE-ETHINYL ESTRADIOL 7DAYSX3 28
TABLET ORAL
Qty: 84 TABLET | Refills: 1 | Status: SHIPPED | OUTPATIENT
Start: 2018-08-06 | End: 2019-03-15

## 2018-08-06 NOTE — TELEPHONE ENCOUNTER
BCP:  Prescription approved per [facility].    Marcei Ambriz, RN, BSN

## 2018-08-20 DIAGNOSIS — F41.1 GAD (GENERALIZED ANXIETY DISORDER): ICD-10-CM

## 2018-08-21 NOTE — TELEPHONE ENCOUNTER
Sertraline    Prescription approved per [facility] [facility].    Lia Candelario, RN, BSN

## 2019-02-24 DIAGNOSIS — F41.1 GAD (GENERALIZED ANXIETY DISORDER): ICD-10-CM

## 2019-02-25 NOTE — TELEPHONE ENCOUNTER
Zoloft:  Medication is being filled for 1 time refill only due to:  Patient needs to be seen because it has been more than one year since last visit due 3/5/19    Marcie Ambriz, RN, BSN

## 2019-03-08 NOTE — PROGRESS NOTES
SUBJECTIVE:   CC: Mary Oakley is an 33 year old woman who presents for preventive health visit.     Physical   Annual:     Getting at least 3 servings of Calcium per day:  Yes    Bi-annual eye exam:  Yes    Dental care twice a year:  Yes    Sleep apnea or symptoms of sleep apnea:  None    Diet:  Regular (no restrictions)    Frequency of exercise:  2-3 days/week    Duration of exercise:  15-30 minutes    Taking medications regularly:  Yes    Medication side effects:  None    Additional concerns today:  No    PHQ-2 Total Score: 0      Today's PHQ-2 Score:   PHQ-2 (  [employer]) 3/15/2019   Q1: Little interest or pleasure in doing things 0   Q2: Feeling down, depressed or hopeless 0   PHQ-2 Score 0   Q1: Little interest or pleasure in doing things Not at all   Q2: Feeling down, depressed or hopeless Not at all   PHQ-2 Score 0       Abuse: Current or Past(Physical, Sexual or Emotional)- No  Do you feel safe in your environment? Yes    Social History     Tobacco Use     Smoking status: Never Smoker     Smokeless tobacco: Never Used   Substance Use Topics     Alcohol use: Yes     Comment: Social Drinker       Reviewed orders with patient.  Reviewed health maintenance and updated orders accordingly - Yes  Labs reviewed in EPIC  BP Readings from Last 3 Encounters:   03/15/19 124/74   18 131/79   18 112/64    Wt Readings from Last 3 Encounters:   03/15/19 70.3 kg (155 lb)   18 68.9 kg (152 lb)   18 68.9 kg (152 lb)                  Patient Active Problem List   Diagnosis     History of  section     MARIAELENA (generalized anxiety disorder)     Encounter for initial prescription of contraceptive pills     Past Surgical History:   Procedure Laterality Date     C NONSPECIFIC PROCEDURE  as Infant    Trigger Thumb Release     COLONOSCOPY       COLONOSCOPY  2013    Procedure: COLONOSCOPY;  colonoscopy;  Surgeon: Moshe Tamez MD;  Location:  GI       Social History     Tobacco Use      Smoking status: Never Smoker     Smokeless tobacco: Never Used   Substance Use Topics     Alcohol use: Yes     Comment: Social Drinker     Family History   Problem Relation Age of Onset     Heart Disease Paternal Grandmother      Cardiovascular Paternal Grandmother      Cancer Paternal Grandmother         skin cancer     Alzheimer Disease Paternal Grandfather      Asthma Brother          Current Outpatient Medications   Medication Sig Dispense Refill     sertraline (ZOLOFT) 50 MG tablet Take 1 tablet (50 mg) by mouth daily 90 tablet 3     No Known Allergies  Recent Labs   Lab Test 03/15/19  0821 18  0834 18  1610 07/20/15  0901   LDL 91 105*  --  83   HDL 61 79  --  71   TRIG 35 89  --  53   ALT 21 13  --   --    CR 0.78 0.65  --   --    GFRESTIMATED >90 >90  --   --    GFRESTBLACK >90 >90  --   --    POTASSIUM 4.5 3.9  --   --    TSH  --   --  1.80  --         Mammogram not appropriate for this patient based on age.    Pertinent mammograms are reviewed under the imaging tab.  History of abnormal Pap smear: NO - age 30- 65 PAP every 3 years recommended  PAP / HPV Latest Ref Rng & Units 2017   PAP - NIL NIL NIL   HPV 16 DNA NEG Negative - -   HPV 18 DNA NEG Negative - -   OTHER HR HPV NEG Negative - -     Reviewed and updated as needed this visit by clinical staff  Tobacco  Allergies  Meds  Med Hx  Surg Hx  Fam Hx  Soc Hx        Reviewed and updated as needed this visit by Provider          Past Medical History:   Diagnosis Date     Breast mass     RT  - Fibrocystic - 10 O'clock     Lymphoid hyperplasia     Colon     Scoliosis       Past Surgical History:   Procedure Laterality Date     C NONSPECIFIC PROCEDURE  as Infant    Trigger Thumb Release     COLONOSCOPY       COLONOSCOPY  2013    Procedure: COLONOSCOPY;  colonoscopy;  Surgeon: Moshe Tamez MD;  Location:  GI     Obstetric History       T2      L2     SAB0   TAB0   Ectopic0   Multiple0   Live  "Births2       # Outcome Date GA Lbr Rajat/2nd Weight Sex Delivery Anes PTL Lv   3 Term 03/24/16 39w0d  4.224 kg (9 lb 5 oz) F CS-LTranv Spinal N JONATHON      Name: Mariela Saldaña Term 01/29/13 41w0d   F CS-LTranv   JONATHON   1 AB 02/2012 5w0d                 Review of Systems   Constitutional: Negative for chills and fever.   HENT: Negative for congestion, ear pain and hearing loss.    Eyes: Negative for pain.   Respiratory: Negative for cough and shortness of breath.    Cardiovascular: Negative for chest pain, palpitations and peripheral edema.   Gastrointestinal: Negative for abdominal pain, constipation, diarrhea, heartburn, hematochezia and nausea.   Breasts:  Negative for tenderness, breast mass and discharge.   Genitourinary: Negative for dysuria, frequency, genital sores, hematuria, pelvic pain, urgency, vaginal bleeding and vaginal discharge.   Musculoskeletal: Negative for arthralgias, joint swelling and myalgias.   Skin: Negative for rash.   Neurological: Negative for dizziness, weakness, headaches and paresthesias.   Psychiatric/Behavioral: Negative for mood changes. The patient is not nervous/anxious.    All other systems reviewed and are negative.         OBJECTIVE:   /74 (BP Location: Right arm, Patient Position: Chair, Cuff Size: Adult Regular)   Pulse 74   Temp 97.8  F (36.6  C) (Temporal)   Resp 16   Ht 1.72 m (5' 7.7\")   Wt 70.3 kg (155 lb)   SpO2 100%   BMI 23.78 kg/m    Physical Exam   Constitutional: She appears well-developed and well-nourished.   HENT:   Head: Normocephalic and atraumatic.   Eyes: EOM are normal.   Cardiovascular: Normal rate, regular rhythm, normal heart sounds and intact distal pulses. Exam reveals no gallop and no friction rub.   No murmur heard.  Pulmonary/Chest: Effort normal and breath sounds normal. No stridor. No respiratory distress. She has no wheezes. She has no rales.   Psychiatric: She has a normal mood and affect.         Diagnostic Test Results:  none " "    ASSESSMENT/PLAN:     Problem List Items Addressed This Visit     MARIAELENA (generalized anxiety disorder)     Well-controlled symptoms.  Continue Zoloft at the current dose.  Refills provided for the next 6 months.         Relevant Medications    sertraline (ZOLOFT) 50 MG tablet    Other Relevant Orders    Comprehensive metabolic panel (Completed)      Other Visit Diagnoses     Encounter for routine adult health examination without abnormal findings    -  Primary    Need for prophylactic vaccination and inoculation against influenza        Hyperlipidemia LDL goal <160        Relevant Orders    Lipid panel reflex to direct LDL Fasting (Completed)          COUNSELING:  Reviewed preventive health counseling, as reflected in patient instructions       Regular exercise       Healthy diet/nutrition    BP Readings from Last 1 Encounters:   03/15/19 124/74     Estimated body mass index is 23.78 kg/m  as calculated from the following:    Height as of this encounter: 1.72 m (5' 7.7\").    Weight as of this encounter: 70.3 kg (155 lb).           reports that  has never smoked. she has never used smokeless tobacco.      Counseling Resources:  ATP IV Guidelines  Pooled Cohorts Equation Calculator  Breast Cancer Risk Calculator  FRAX Risk Assessment  ICSI Preventive Guidelines  Dietary Guidelines for Americans, 2010  USDA's MyPlate  ASA Prophylaxis  Lung CA Screening    Mildred Mckeon MD  [facility]  "

## 2019-03-15 ENCOUNTER — OFFICE VISIT (OUTPATIENT)
Dept: FAMILY MEDICINE | Facility: OTHER | Age: 34
End: 2019-03-15
Payer: COMMERCIAL

## 2019-03-15 VITALS
DIASTOLIC BLOOD PRESSURE: 74 MMHG | TEMPERATURE: 97.8 F | SYSTOLIC BLOOD PRESSURE: 124 MMHG | RESPIRATION RATE: 16 BRPM | WEIGHT: 155 LBS | BODY MASS INDEX: 23.49 KG/M2 | HEIGHT: 68 IN | OXYGEN SATURATION: 100 % | HEART RATE: 74 BPM

## 2019-03-15 DIAGNOSIS — Z23 NEED FOR PROPHYLACTIC VACCINATION AND INOCULATION AGAINST INFLUENZA: ICD-10-CM

## 2019-03-15 DIAGNOSIS — F41.1 GAD (GENERALIZED ANXIETY DISORDER): ICD-10-CM

## 2019-03-15 DIAGNOSIS — Z00.00 ENCOUNTER FOR ROUTINE ADULT HEALTH EXAMINATION WITHOUT ABNORMAL FINDINGS: Primary | ICD-10-CM

## 2019-03-15 DIAGNOSIS — E78.5 HYPERLIPIDEMIA LDL GOAL <160: ICD-10-CM

## 2019-03-15 LAB
ALBUMIN SERPL-MCNC: 4.1 G/DL (ref 3.4–5)
ALP SERPL-CCNC: 78 U/L (ref 40–150)
ALT SERPL W P-5'-P-CCNC: 21 U/L (ref 0–50)
ANION GAP SERPL CALCULATED.3IONS-SCNC: 8 MMOL/L (ref 3–14)
AST SERPL W P-5'-P-CCNC: 14 U/L (ref 0–45)
BILIRUB SERPL-MCNC: 0.7 MG/DL (ref 0.2–1.3)
BUN SERPL-MCNC: 14 MG/DL (ref 7–30)
CALCIUM SERPL-MCNC: 8.9 MG/DL (ref 8.5–10.1)
CHLORIDE SERPL-SCNC: 108 MMOL/L (ref 94–109)
CHOLEST SERPL-MCNC: 159 MG/DL
CO2 SERPL-SCNC: 25 MMOL/L (ref 20–32)
CREAT SERPL-MCNC: 0.78 MG/DL (ref 0.52–1.04)
GFR SERPL CREATININE-BSD FRML MDRD: >90 ML/MIN/{1.73_M2}
GLUCOSE SERPL-MCNC: 82 MG/DL (ref 70–99)
HDLC SERPL-MCNC: 61 MG/DL
LDLC SERPL CALC-MCNC: 91 MG/DL
NONHDLC SERPL-MCNC: 98 MG/DL
POTASSIUM SERPL-SCNC: 4.5 MMOL/L (ref 3.4–5.3)
PROT SERPL-MCNC: 7.7 G/DL (ref 6.8–8.8)
SODIUM SERPL-SCNC: 141 MMOL/L (ref 133–144)
TRIGL SERPL-MCNC: 35 MG/DL

## 2019-03-15 PROCEDURE — 99395 PREV VISIT EST AGE 18-39: CPT | Performed by: FAMILY MEDICINE

## 2019-03-15 PROCEDURE — 80053 COMPREHEN METABOLIC PANEL: CPT | Performed by: FAMILY MEDICINE

## 2019-03-15 PROCEDURE — 36415 COLL VENOUS BLD VENIPUNCTURE: CPT | Performed by: FAMILY MEDICINE

## 2019-03-15 PROCEDURE — 80061 LIPID PANEL: CPT | Performed by: FAMILY MEDICINE

## 2019-03-15 ASSESSMENT — ANXIETY QUESTIONNAIRES
GAD7 TOTAL SCORE: 3
2. NOT BEING ABLE TO STOP OR CONTROL WORRYING: NOT AT ALL
1. FEELING NERVOUS, ANXIOUS, OR ON EDGE: SEVERAL DAYS
5. BEING SO RESTLESS THAT IT IS HARD TO SIT STILL: NOT AT ALL
3. WORRYING TOO MUCH ABOUT DIFFERENT THINGS: SEVERAL DAYS
IF YOU CHECKED OFF ANY PROBLEMS ON THIS QUESTIONNAIRE, HOW DIFFICULT HAVE THESE PROBLEMS MADE IT FOR YOU TO DO YOUR WORK, TAKE CARE OF THINGS AT HOME, OR GET ALONG WITH OTHER PEOPLE: NOT DIFFICULT AT ALL
6. BECOMING EASILY ANNOYED OR IRRITABLE: NOT AT ALL
7. FEELING AFRAID AS IF SOMETHING AWFUL MIGHT HAPPEN: SEVERAL DAYS

## 2019-03-15 ASSESSMENT — ENCOUNTER SYMPTOMS
FEVER: 0
PARESTHESIAS: 0
MYALGIAS: 0
EYE PAIN: 0
SHORTNESS OF BREATH: 0
DYSURIA: 0
HEARTBURN: 0
CONSTIPATION: 0
DIARRHEA: 0
NAUSEA: 0
NERVOUS/ANXIOUS: 0
BREAST MASS: 0
DIZZINESS: 0
ARTHRALGIAS: 0
WEAKNESS: 0
PALPITATIONS: 0
HEADACHES: 0
HEMATOCHEZIA: 0
FREQUENCY: 0
ABDOMINAL PAIN: 0
JOINT SWELLING: 0
COUGH: 0
HEMATURIA: 0
CHILLS: 0

## 2019-03-15 ASSESSMENT — PAIN SCALES - GENERAL: PAINLEVEL: NO PAIN (0)

## 2019-03-15 ASSESSMENT — PATIENT HEALTH QUESTIONNAIRE - PHQ9
SUM OF ALL RESPONSES TO PHQ QUESTIONS 1-9: 1
5. POOR APPETITE OR OVEREATING: NOT AT ALL

## 2019-03-15 ASSESSMENT — MIFFLIN-ST. JEOR: SCORE: 1446.82

## 2019-03-16 ASSESSMENT — ANXIETY QUESTIONNAIRES: GAD7 TOTAL SCORE: 3

## 2019-07-16 NOTE — PROGRESS NOTES
"Subjective     Mary Oakley is a 33 year old female who presents to clinic today for the following health issues:    HPI   Acute Illness   Acute illness concerns: dry sometimes wet cough   Onset: 6 weeks ago    Fever: no    Chills/Sweats: no    Headache (location?): no    Sinus Pressure:no    Conjunctivitis:  no    Ear Pain: no    Rhinorrhea: no    Congestion: YES- probably    Sore Throat: no     Cough: YES-non-productive    Wheeze: no    Decreased Appetite: no    Nausea: no    Vomiting: no    Diarrhea:  no    Dysuria/Freq.: no    Fatigue/Achiness: no    Sick/Strep Exposure: YES, kids were sick     Therapies Tried and outcome: none    - Whole family was sick  - At gym, no endurance   - Kids cough went away, her's endured   - Random cough, not at night  - No seasonal allergies  - Never smoker, no family history of asthma or COPD       Review of Systems   ROS COMP: Constitutional, HEENT, cardiovascular, pulmonary, gi and gu systems are negative, except as otherwise noted.      Objective    /70   Pulse 76   Temp 98.1  F (36.7  C) (Temporal)   Resp 16   Ht 1.702 m (5' 7\")   Wt 72.1 kg (159 lb)   LMP 06/30/2019 (Within Days)   SpO2 98%   BMI 24.90 kg/m    Body mass index is 24.9 kg/m .  Physical Exam   GENERAL APPEARANCE: healthy appearing, alert and no distress  EYES: Eyes grossly normal to inspection, PERRLA, conjunctivae and sclerae without injection or discharge, EOM intact   HENT: Bilateral ear canals without erythema or cerumen, bilateral TM's pearly grey with normal light reflex, no effusion, injection, or bulging, nasal turbinates without swelling, erythema, or discharge, mouth without ulcers or lesions, oropharynx clear and oral mucous membranes moist, no sinus tenderness   NECK: No adenopathy in cervical or supraclavicular regions  RESP: Occasional very faint wheeze with expiration heard only anteriorly in bilateral bronchial areas, remainder clear to auscultation - no rales, rhonchi or wheezes "   CV: Regular rates and rhythm, normal S1 S2, no S3 or S4, no murmur, click or rub  MS: No musculoskeletal defects are noted and gait is age appropriate without ataxia   SKIN: No suspicious lesions or rashes, hydration status appears adeuqate with normal skin turgor   PSYCH: Alert and oriented x3; speech- coherent , normal rate and volume; able to articulate logical thoughts, able to abstract reason, no tangential thoughts, no hallucinations or delusions, mentation appears normal, Mood is euthymic. Affect is appropriate for this mood state and bright. Thought content is free of suicidal ideation, hallucinations, and delusions. Dress is adequate and upkept. Eye contact is good during conversation.       Diagnostic Test Results:  none         Assessment & Plan       ICD-10-CM    1. Cough R05 albuterol (PROAIR HFA/PROVENTIL HFA/VENTOLIN HFA) 108 (90 Base) MCG/ACT inhaler     - Discussed with patient no signs of infection   - As above, occasional very faint wheeze with expiration heard only anteriorly in bilateral bronchial areas   - Suspect post-infectious cough   - Recommend trial of albuterol, reviewed proper use and side effects     2 puffs three times a day for 1 week        + 2 puffs prior to exercise     Then just as needed for cough   - If persists, return to clinic and will get spirometry     The patient indicates understanding of these issues and agrees with the plan.    Return in about 2 weeks (around 8/1/2019) for if not improving.    Colleen Perez PA-C  [facility]

## 2019-07-18 ENCOUNTER — OFFICE VISIT (OUTPATIENT)
Dept: FAMILY MEDICINE | Facility: OTHER | Age: 34
End: 2019-07-18
Payer: COMMERCIAL

## 2019-07-18 VITALS
TEMPERATURE: 98.1 F | BODY MASS INDEX: 24.96 KG/M2 | HEIGHT: 67 IN | SYSTOLIC BLOOD PRESSURE: 116 MMHG | OXYGEN SATURATION: 98 % | HEART RATE: 76 BPM | WEIGHT: 159 LBS | DIASTOLIC BLOOD PRESSURE: 70 MMHG | RESPIRATION RATE: 16 BRPM

## 2019-07-18 DIAGNOSIS — R05.9 COUGH: Primary | ICD-10-CM

## 2019-07-18 PROCEDURE — 99213 OFFICE O/P EST LOW 20 MIN: CPT | Performed by: PHYSICIAN ASSISTANT

## 2019-07-18 RX ORDER — ALBUTEROL SULFATE 90 UG/1
2 AEROSOL, METERED RESPIRATORY (INHALATION) EVERY 6 HOURS PRN
Qty: 18 G | Refills: 0 | Status: SHIPPED | OUTPATIENT
Start: 2019-07-18 | End: 2021-04-23

## 2019-07-18 ASSESSMENT — PAIN SCALES - GENERAL: PAINLEVEL: NO PAIN (0)

## 2019-07-18 ASSESSMENT — MIFFLIN-ST. JEOR: SCORE: 1453.85

## 2019-07-18 NOTE — PATIENT INSTRUCTIONS
- Albuterol inhaler      2 puffs three times a day for 1 week        + 2 puffs prior to exercise       Then just as needed for cough

## 2019-08-12 ENCOUNTER — MYC MEDICAL ADVICE (OUTPATIENT)
Dept: FAMILY MEDICINE | Facility: OTHER | Age: 34
End: 2019-08-12

## 2019-08-12 NOTE — TELEPHONE ENCOUNTER
Next 5 appointments (look out 90 days)    Aug 15, 2019  5:30 PM CDT  [facility] with Colleen Perez PA-C  [facility] [address]  [facility] [address]  [phone]

## 2019-08-13 NOTE — PROGRESS NOTES
Subjective     Mary Oakley is a 33 year old female who presents to clinic today for the following health issues:    HPI   Acute Illness   Acute illness concerns: cough  Onset: all summer    Fever: no    Chills/Sweats: no    Headache (location?): no    Sinus Pressure:no    Conjunctivitis:  no    Ear Pain: no    Rhinorrhea: no    Congestion: no    Sore Throat: no     Cough: YES-productive of clear sputum    Wheeze: no    Decreased Appetite: no    Nausea: no    Vomiting: no    Diarrhea:  no    Dysuria/Freq.: no    Fatigue/Achiness: no    Sick/Strep Exposure: no     Therapies Tried and outcome: none    -Since last visit, patient states that her cough has relatively stayed the same. She used the albuterol inhaler every 4 hours for 7 days, which did not improve her cough. However, she did retrial the inhaler three days ago and thought that it may have improved her cough the second time around.  - Denies seasonal allergies (nasal congestion, rhinorrhea, post nasal drip, sore throat)  - Never smoker, no family history of asthma or COPD   - Denies GERD symptoms   - Denies fevers, chills, nausea, vomiting, rashes  - Endorses intermittent chest pressure and SOB, primarily with exercise. At last visit, patient was instructed to try her albuterol inhaler prior to exercise, but today she states that she did not do this.    Plan from last visit 7/18/19   - Discussed with patient no signs of infection   - As above, occasional very faint wheeze with expiration heard only anteriorly in bilateral bronchial areas   - Suspect post-infectious cough   - Recommend trial of albuterol, reviewed proper use and side effects     2 puffs three times a day for 1 week        + 2 puffs prior to exercise     Then just as needed for cough   - If persists, return to clinic and will get spirometry     Review of Systems   ROS COMP: Constitutional, HEENT, cardiovascular, pulmonary, GI, and skin systems are negative, except as otherwise noted.     "  Objective    /74   Pulse 76   Temp 98.3  F (36.8  C) (Temporal)   Resp 16   Ht 1.702 m (5' 7\")   Wt 73.2 kg (161 lb 6.4 oz)   LMP  (Within Weeks)   SpO2 97%   BMI 25.28 kg/m    Body mass index is 25.28 kg/m .  Physical Exam   GENERAL: healthy, alert and no distress  EYES: Eyes grossly normal to inspection, PERRL and conjunctivae and sclerae normal  HENT: ear canals and TM's normal, nose and mouth without ulcers or lesions  NECK: no adenopathy, no asymmetry, masses, or scars  RESP: lungs clear to auscultation - no rales, rhonchi or wheezes  CV: regular rate and rhythm, normal S1 S2, no S3 or S4, no murmur, click or rub  NEURO: CN 2-12 grossly intact  PSYCH: mentation appears normal, affect normal/bright    Diagnostic Test Results:  Labs reviewed in Epic    Chest Xray:                                                                  IMPRESSION: The lungs are clear. No focal pulmonary opacities. Heart  and mediastinum are unremarkable. No acute cardiopulmonary  Abnormalities.            Spirometry: Results WNL, no signs of an obstructive or restrictive lung disease                                               EKG: NSR, no acute changes, normal axis and voltage, no comparison available         Assessment & Plan       ICD-10-CM    1. Cough R05 Spirometry, Breathing Capacity: Normal Order, Clinic Performed   2. Chest pain, unspecified type R07.9 XR Chest 2 Views     EKG 12-lead complete w/read - Clinics     As patient endorsed a history of intermittent chest pressure and tightness, obtained and EKG and chest x-ray today, which were WNL. Informed patient that her spirometry results were WNL and did not suggest and obstructive or restrictive lung disease process. Informed patient that allergies can occasionally cause a persistent cough. Thus, recommend that she start an OTC antihistamines and take once daily in the morning. Instructed her to try this for two weeks and see if this improves her cough. Also, " instructed patient to continue to use the albuterol inhaler (2 puffs TID). Instructed patient to use the inhaler prior to exercise and see if this improves her chest tightness and SOB. If these interventions do not improve her symptoms over the next two weeks, instructed patient to notify the clinic and we will consider a CT scan at that time.     - Discussed warning signs that would warrant return to clinic/ED     Patient voiced understanding and agreement of the plan of care.     Return in about 2 weeks (around 8/29/2019) for If symptoms are not improving .    Patient was seen with IGOR Bear-S2     IGOR [place]-C  [facility]

## 2019-08-15 ENCOUNTER — ANCILLARY PROCEDURE (OUTPATIENT)
Dept: GENERAL RADIOLOGY | Facility: OTHER | Age: 34
End: 2019-08-15
Attending: PHYSICIAN ASSISTANT
Payer: COMMERCIAL

## 2019-08-15 ENCOUNTER — OFFICE VISIT (OUTPATIENT)
Dept: FAMILY MEDICINE | Facility: OTHER | Age: 34
End: 2019-08-15
Payer: COMMERCIAL

## 2019-08-15 VITALS
HEIGHT: 67 IN | WEIGHT: 161.4 LBS | SYSTOLIC BLOOD PRESSURE: 122 MMHG | DIASTOLIC BLOOD PRESSURE: 74 MMHG | HEART RATE: 76 BPM | TEMPERATURE: 98.3 F | OXYGEN SATURATION: 97 % | RESPIRATION RATE: 16 BRPM | BODY MASS INDEX: 25.33 KG/M2

## 2019-08-15 DIAGNOSIS — R05.9 COUGH: Primary | ICD-10-CM

## 2019-08-15 DIAGNOSIS — R07.9 CHEST PAIN, UNSPECIFIED TYPE: ICD-10-CM

## 2019-08-15 LAB
FEF 25/75: NORMAL
FEV-1: NORMAL
FEV1/FVC: NORMAL
FVC: NORMAL

## 2019-08-15 PROCEDURE — 99214 OFFICE O/P EST MOD 30 MIN: CPT | Mod: 25 | Performed by: PHYSICIAN ASSISTANT

## 2019-08-15 PROCEDURE — 71046 X-RAY EXAM CHEST 2 VIEWS: CPT

## 2019-08-15 PROCEDURE — 94010 BREATHING CAPACITY TEST: CPT | Performed by: PHYSICIAN ASSISTANT

## 2019-08-15 PROCEDURE — 93000 ELECTROCARDIOGRAM COMPLETE: CPT | Performed by: PHYSICIAN ASSISTANT

## 2019-08-15 ASSESSMENT — MIFFLIN-ST. JEOR: SCORE: 1464.74

## 2019-08-15 ASSESSMENT — PAIN SCALES - GENERAL: PAINLEVEL: NO PAIN (0)

## 2019-08-15 NOTE — PATIENT INSTRUCTIONS
1. Start OTC antihistamine - Claritin, allegra, zyrtec, etc.      Once daily in the morning       Try for 2 weeks       If not improvement, will consider CT scan     2. Continue albuterol - 2 puffs three times a day

## 2019-10-03 ENCOUNTER — IMMUNIZATION (OUTPATIENT)
Dept: FAMILY MEDICINE | Facility: OTHER | Age: 34
End: 2019-10-03
Payer: COMMERCIAL

## 2019-10-03 PROCEDURE — 90471 IMMUNIZATION ADMIN: CPT

## 2019-10-03 PROCEDURE — 90686 IIV4 VACC NO PRSV 0.5 ML IM: CPT

## 2020-03-11 DIAGNOSIS — F41.1 GAD (GENERALIZED ANXIETY DISORDER): ICD-10-CM

## 2020-03-14 NOTE — TELEPHONE ENCOUNTER
Pending Prescriptions:                       Disp   Refills    sertraline (ZOLOFT) 50 MG tablet [Pharmac*30 tab*4            Sig: TAKE 1 TABLET BY MOUTH ONCE DAILY    Prescription approved per [facility] Refill Protocol.    Yolanda Tolbert, HEMAN, RN, PHN

## 2020-03-20 ENCOUNTER — E-VISIT (OUTPATIENT)
Dept: FAMILY MEDICINE | Facility: OTHER | Age: 35
End: 2020-03-20
Payer: COMMERCIAL

## 2020-03-20 DIAGNOSIS — H10.30 ACUTE CONJUNCTIVITIS, UNSPECIFIED ACUTE CONJUNCTIVITIS TYPE, UNSPECIFIED LATERALITY: Primary | ICD-10-CM

## 2020-03-20 PROCEDURE — 99421 OL DIG E/M SVC 5-10 MIN: CPT | Performed by: FAMILY MEDICINE

## 2020-03-20 RX ORDER — POLYMYXIN B SULFATE AND TRIMETHOPRIM 1; 10000 MG/ML; [USP'U]/ML
1-2 SOLUTION OPHTHALMIC EVERY 4 HOURS
Qty: 10 ML | Refills: 0 | Status: SHIPPED | OUTPATIENT
Start: 2020-03-20 | End: 2020-03-27

## 2020-03-20 NOTE — PATIENT INSTRUCTIONS
Thank you for choosing us for your care. I have placed an order for a prescription so that you can start treatment. View your full visit summary for details by clicking on the link below. Your pharmacist will able to address any questions you may have about the medication.     If you re not feeling better within 2-3 days, please schedule an appointment.  You can schedule an appointment right here in [facility], or call [phone]  If the visit is for the same symptoms as your e-visit, we ll refund the cost of your e-visit if seen within seven days.      Conjunctivitis, Nonspecific    The membrane that covers the white part of your eye (the conjunctiva) is inflamed. Inflammation happens when your body responds to an injury, allergic reaction, infection, or illness. Symptoms of inflammation in the eye may include redness, irritation, itching, swelling, or burning. These symptoms should go away within the next 24 hours. Conjunctivitis may be related to a particle that was in your eye. If so, it may wash out with your tears or irrigation treatment. Being exposed to liquid chemicals or fumes may also cause this reaction.   Home care    Apply a cold pack over the eye for 20 minutes at a time. This will reduce pain. To make a cold pack, put ice cubes in a plastic bag that seals at the top. Wrap the bag in a clean, thin towel or cloth.    Artificial tears may be prescribed to reduce irritation or redness.  These should be used 3 to 4 times a day.    You may use acetaminophen or ibuprofen to control pain, unless another medicine was prescribed. (Note: If you have chronic liver or kidney disease, or if you have ever had a stomach ulcer or gastrointestinal bleeding, talk with your healthcare provider before using these medicines.)  Follow-up care  Follow up with your healthcare provider, or as advised.  When to seek medical advice  Call your healthcare provider right away if any of these occur:    Increased eyelid  swelling    Increased eye pain    Increased redness or drainage from the eye    Increased blurry vision or increased sensitivity to light    Failure of normal vision to return within 24 to 48 hours  Date Last Reviewed: 7/1/2017 2000-2019 [employer]. [address] [place] [address], [place] [address]. All rights reserved. This information is not intended as a substitute for professional medical care. Always follow your healthcare professional's instructions.

## 2020-04-28 ENCOUNTER — MYC MEDICAL ADVICE (OUTPATIENT)
Dept: FAMILY MEDICINE | Facility: OTHER | Age: 35
End: 2020-04-28

## 2020-04-28 NOTE — TELEPHONE ENCOUNTER
Patient reviewed Leho message at 10:30am- left message to see if she needs help setting up an appointment.  Kaykay Peck CMA ([facility])

## 2020-08-10 DIAGNOSIS — F41.1 GAD (GENERALIZED ANXIETY DISORDER): ICD-10-CM

## 2020-08-12 NOTE — TELEPHONE ENCOUNTER
Prescription approved per [facility] Refill Protocol.  Yolanda Tolbert, BSN, RN, PHN

## 2020-11-13 NOTE — PATIENT INSTRUCTIONS
[facility] Dermatology Visit    Thank you for allowing us to participate in your care. Your findings, instructions and follow-up plan are as follows:       When should I call my doctor?    If you are worsening or not improving, please, contact us or seek urgent care as noted below.     Who should I call with questions (adults)?    [facility] (adult and pediatric): [phone]     [facility] (adult): [phone]    For urgent needs outside of business hours call the [facility] [place] at [phone] and ask for the dermatology resident on call    If this is a medical emergency and you are unable to reach an ER, Call 911      Who should I call with questions (pediatric)?  [facility]- Pediatric Dermatology  Dr. Jennifer Amin, Dr. Javon Silva, Dr. Beulah Chaidez, Soledad Olivarez, PA  Dr. India Murphy, Dr. Audrey Madison & Dr. Kyle Lorenzana  Non Urgent  Nurse Triage Line; [phone]- Sandra and Kathleen RN Care Coordinators   Ashley (/Complex ) [phone]    If you need a prescription refill, please contact your pharmacy. Refills are approved or denied by our Physicians during normal business hours, Monday through Fridays  Per office policy, refills will not be granted if you have not been seen within the past year (or sooner depending on your child's condition)    Scheduling Information:  Pediatric Appointment Scheduling and Call Center [phone]  Radiology Scheduling- [phone]  Sedation Unit Scheduling- [phone]  Fred Scheduling- General [phone]; Pediatric Dermatology [phone]  Main  Services: [phone]  Kiswahili: [phone]  Maldivian: [phone]  Hmong/Occitan/Polish: [phone]  Preadmission Nursing Department Fax Number: [phone] (Fax all pre-operative paperwork to this number)    For urgent matters arising during evenings,  weekends, or holidays that cannot wait for normal business hours please call [phone] and ask for the Dermatology Resident On-Call to be paged.      Cryotherapy    What is it?    Use of a very cold liquid, such as liquid nitrogen, to freeze and destroy abnormal skin cells that need to be removed    What should I expect?    Tenderness and redness    A small blister that might grow and fill with dark purple blood. There may be crusting.    More than one treatment may be needed if the lesions do not go away.    How do I care for the treated area?    Gently wash the area with your hands when bathing.    Use a thin layer of Vaseline to help with healing. You may use a Band-Aid.     The area should heal within 7-10 days and may leave behind a pink or lighter color.     Do not use an antibiotic or Neosporin ointment.     You may take acetaminophen (Tylenol) for pain.     Call your Doctor if you have:    Severe pain    Signs of infection (warmth, redness, cloudy yellow drainage, and or a bad smell)    Questions or concerns    Who should I call with questions?       [facility]: [phone]       [facility]: [phone]       For urgent needs outside of business hours call the [facility] at [phone]        and ask for the dermatology resident on call    Wound Care After a Biopsy    What is a skin biopsy?  A skin biopsy allows the doctor to examine a very small piece of tissue under the microscope to determine the diagnosis and the best treatment for the skin condition. A local anesthetic (numbing medicine)  is injected with a very small needle into the skin area to be tested. A small piece of skin is taken from the area. Sometimes a suture (stitch) is used.     What are the risks of a skin biopsy?  I will experience scar, bleeding, swelling, pain, crusting and redness. I may experience incomplete removal or recurrence. Risks of this procedure  are excessive bleeding, bruising, infection, nerve damage, numbness, thick (hypertrophic or keloidal) scar and non-diagnostic biopsy.    How should I care for my wound for the first 24 hours?    Keep the wound dry and covered for 24 hours    If it bleeds, hold direct pressure on the area for 15 minutes. If bleeding does not stop then go to the emergency room    Avoid strenuous exercise the first 1-2 days or as your doctor instructs you    How should I care for the wound after 24 hours?    After 24 hours, remove the bandage    You may bathe or shower as normal    If you had a scalp biopsy, you can shampoo as usual and can use shower water to clean the biopsy site daily    Clean the wound twice a day with gentle soap and water    Do not scrub, be gentle    Apply white petroleum/Vaseline after cleaning the wound with a cotton swab or a clean finger, and keep the site covered with a Bandaid /bandage. Bandages are not necessary with a scalp biopsy    If you are unable to cover the site with a Bandaid /bandage, re-apply ointment 2-3 times a day to keep the site moist. Moisture will help with healing    Avoid strenuous activity for first 1-2 days    Avoid lakes, rivers, pools, and oceans until the stitches are removed or the site is healed    How do I clean my wound?    Wash hands thoroughly with soap or use hand  before all wound care    Clean the wound with gentle soap and water    Apply white petroleum/Vaseline  to wound after it is clean    Replace the Bandaid /bandage to keep the wound covered for the first few days or as instructed by your doctor    If you had a scalp biopsy, warm shower water to the area on a daily basis should suffice    What should I use to clean my wound?     Cotton-tipped applicators (Qtips )    White petroleum jelly (Vaseline ). Use a clean new container and use Q-tips to apply.    Bandaids   as needed    Gentle soap     How should I care for my wound long term?    Do not get your  wound dirty    Keep up with wound care for one week or until the area is healed.    A small scab will form and fall off by itself when the area is completely healed. The area will be red and will become pink in color as it heals. Sun protection is very important for how your scar will turn out. Sunscreen with an SPF 30 or greater is recommended once the area is healed..    You should have some soreness but it should be mild and slowly go away over several days. Talk to your doctor about using tylenol for pain,    When should I call my doctor?  If you have increased:     Pain or swelling    Pus or drainage (clear or slightly yellow drainage is ok)    Temperature over 100F    Spreading redness or warmth around wound    When will I hear about my results?  The biopsy results can take 2-3 weeks to come back. The clinic will call you with the results, send you a [facility] [employer] [facility] [employer]) message, or have you schedule a follow-up clinic or phone time to discuss the results. Contact our clinics if you do not hear from us in 3 weeks.     Who should I call with questions?    [facility]: [phone]     [facility]: [phone]    For urgent needs outside of business hours call the [facility] at [phone] and ask for the dermatology resident on call

## 2020-11-13 NOTE — PROGRESS NOTES
[facility] Dermatology Note  Encounter Date: Apr 27, 2021  Office Visit     Dermatology Problem List:  1. Halo nevus   -Right arm pigmented macule with depigmented halo s/p biopsy 1/2/2018   2. Atypical nevus   - Mid back with compound melanocytic nevus with mild atypia s/p biopsy 8/8/2016   3. Eczematous dermatitis with fungal elements, right thigh  -s/p terbinafine cream initiated 4/30/2015  4. Hypertrophic scar, neck  -s/p ILK  5. Family history of melanoma - mother  6. Lichenification of the right ankle  - Current tx: Lidex BID x 4 weeks  7. NUB - right antecubital fossa - bx 4/27/21    ____________________________________________    Assessment & Plan:    # Family history of melanoma - mother.  - ABCDEs: Counseled ABCDEs of melanoma: Asymmetry, Border (irregularity), Color (not uniform, changes in color), Diameter (greater than 6 mm which is about the size of a pencil eraser), and Evolving (any changes in preexisting moles).  - Sun protection: Counseled SPF30+ sunscreen, UPF clothing, sun avoidance, tanning bed avoidance.   - Will check face at follow up without make up    # History of atypical nevus and halo nevus: repigmentation on the mid back has had of halo nevi - scar was identified today, does not appear to be re pigmenting today, based on exam. Agree with Dr. Traylor that there is no evidence of re pigmentation.  - Recommend yearly skin exams    # Neoplasm of uncertain behavior on the right antecubital fossa. The differential diagnosis includes DN. Rule out MM   - See procedure note.     # Neoplasm of uncertain behavior on the left abodmen. The differential diagnosis includes DN vs other.   - See procedure note.     # Upper eyelids - skin tags   - Consider cosmetic removal at follow up    # Multiple clinically benign nevi.    - No further intervention needed.     # Yellow to skin colored 1-2 mm papules coalescing into plaques on the areola, appear to be normal mcgregor gland.    -  Follow up with OB/GYN for further evaluation     # Verruca plantaris and plana - left plantar foot x 2, right dorsal hand x 5, right second x 2 and third toe x 2   - See cryo procedure note.   - OK to use OTC sal acid in between treatment when area healed   - Follow up in 4 weeks    # Lichenification of the right ankle - c/w lichen simplex chronicus and nike nodule- counseled that this is likely due to friction   - Start Lidex BID x 4 weeks  -Reviewed risks of skin thinning.      Procedures Performed:   -Cryotherapy procedure note: After verbal consent and discussion of risks and benefits including but no limited to dyspigmentation/scar, blister, and pain, 11 verruca was(were) treated with 1-2mm freeze border for 2 cycles with liquid nitrogen. Post cryotherapy instructions were provided.     -Shave biopsy: Location(s) right antecubital fossa and abdomen.  After discussion of benefits and risks including but not limited to bleeding/bruising, pain/swelling, infection, scar, incomplete removal, nerve damage/numbness, recurrence, and non-diagnostic biopsy, written consent, verbal consent and photographs were obtained. Time-out was performed. The area was cleaned with isopropyl alcohol. 0.5mL of 1% lidocaine with epinephrine was injected to obtain adequate anesthesia of each lesion. Shave biopsy was performed. Hemostasis was achieved with aluminium chloride. Vaseline and a sterile dressing were applied. The patient tolerated the procedure and no complications were noted. The patient was provided with verbal and written post care instructions.       Follow-up: 4 week(s) in-person, or earlier for new or changing lesions  1 year for skin check    Staff and Scribe:     Scribe Disclosure:   INigel, am serving as a scribe to document services personally performed by this physician, Dr. Tereza Vogel, based on data collection and the provider's statements to me.     Provider Disclosure:   The documentation recorded  "by the scribe accurately reflects the services I personally performed and the decisions made by me.    Tereza Vogel MD    Department of Dermatology  [facility]: Phone: [phone], Fax:[phone]  [facility]: Phone: [phone], Fax: [phone]      ____________________________________________    CC: Derm Problem (Mary is here today for a skin exam, pt states \"spot on back that has been previously bx, when she went to get removed they provider couldn't find it\")    HPI:  Ms. Mary Oakley is a(n) 35 year old female who presents today as a return patient for a skin exam.    Last seen 5/31/2018 by Dr. Traylor. At that time, an atypical nevus on the mid back was re pigmenting. Plan was for patient to follow up for excision, but patient never returned.    Today, she has concerns about a spot on the back that was previously biopsied. This was unable to be excised at her previous visit, as it was unable to be identified. She does have some warts on her feet that she would like treated. She has been using OTC products as treatment recently.    She also notes her hands are dry and she has bumps on her knuckles. She notes her mother had a melanoma recently. Denies a family history of pancreatic or ovarian cancer    Patient is otherwise feeling well, without additional skin concerns.    Labs Reviewed:  Derm path from 08/082016 reviewed.  Skin, back, mid, biopsy:   - Compound melanocytic nevus with mild atypia - (see description)     Physical Exam:  Vitals: LMP 03/29/2021 (Exact Date)   SKIN: Total skin excluding the undergarment areas was performed. The exam included the head/face, neck, both arms, chest, back, abdomen, both legs, buttock, digits and/or nails.  - Make up on the face   - No signs of re pigmentation at the mid back  - There is(are) skin colored pedunculated papules on the upper " eyelids.  - Multiple regular brown pigmented macules and papules are identified on the trunk and extremities.   - Yellow to skin colored 1-2 mm papules coalescing into plaques on the areola  - Lichenification of the right ankle  - There are verrucous papules with thrombosed capillaries interrupting dermatoglyphics on the left plantar foot x 2, right second and third toe x 3.  - Right antecubital fossa: 1.5 mm irregularly pigmented macule  - Left abdomen: 5 mm macule  - No other lesions of concern on areas examined.     Medications:  Current Outpatient Medications   Medication     sertraline (ZOLOFT) 25 MG tablet     No current facility-administered medications for this visit.       Past Medical History:   Patient Active Problem List   Diagnosis     History of  section     MARIAELENA (generalized anxiety disorder)     Past Medical History:   Diagnosis Date     Breast mass     RT  - Fibrocystic - 10 O'clock     Lymphoid hyperplasia     Colon     Scoliosis         CC No referring provider defined for this encounter. on close of this encounter.

## 2020-12-06 ENCOUNTER — HEALTH MAINTENANCE LETTER (OUTPATIENT)
Age: 35
End: 2020-12-06

## 2020-12-23 ENCOUNTER — MYC MEDICAL ADVICE (OUTPATIENT)
Dept: FAMILY MEDICINE | Facility: OTHER | Age: 35
End: 2020-12-23

## 2020-12-23 DIAGNOSIS — Z20.822 SUSPECTED COVID-19 VIRUS INFECTION: Primary | ICD-10-CM

## 2020-12-29 DIAGNOSIS — Z20.822 SUSPECTED COVID-19 VIRUS INFECTION: ICD-10-CM

## 2020-12-29 PROCEDURE — U0003 INFECTIOUS AGENT DETECTION BY NUCLEIC ACID (DNA OR RNA); SEVERE ACUTE RESPIRATORY SYNDROME CORONAVIRUS 2 (SARS-COV-2) (CORONAVIRUS DISEASE [COVID-19]), AMPLIFIED PROBE TECHNIQUE, MAKING USE OF HIGH THROUGHPUT TECHNOLOGIES AS DESCRIBED BY CMS-2020-01-R: HCPCS | Performed by: FAMILY MEDICINE

## 2020-12-30 ENCOUNTER — MYC MEDICAL ADVICE (OUTPATIENT)
Dept: FAMILY MEDICINE | Facility: OTHER | Age: 35
End: 2020-12-30

## 2020-12-30 DIAGNOSIS — Z86.16 HISTORY OF 2019 NOVEL CORONAVIRUS DISEASE (COVID-19): Primary | ICD-10-CM

## 2020-12-30 LAB
SARS-COV-2 RNA SPEC QL NAA+PROBE: NOT DETECTED
SPECIMEN SOURCE: NORMAL

## 2021-01-05 DIAGNOSIS — Z86.16 HISTORY OF 2019 NOVEL CORONAVIRUS DISEASE (COVID-19): ICD-10-CM

## 2021-01-05 PROCEDURE — 86769 SARS-COV-2 COVID-19 ANTIBODY: CPT | Performed by: PHYSICIAN ASSISTANT

## 2021-01-05 PROCEDURE — 36415 COLL VENOUS BLD VENIPUNCTURE: CPT | Performed by: PHYSICIAN ASSISTANT

## 2021-01-06 LAB
SARS-COV-2 AB PNL SERPL IA: NEGATIVE
SARS-COV-2 IGG SERPL IA-ACNC: NORMAL

## 2021-01-26 NOTE — RESULT ENCOUNTER NOTE
Test results discussed with patient during office visit.     Colleen Perez PA-C
Test results discussed with patient during office visit.     Colleen Perez PA-C
Alert

## 2021-02-06 DIAGNOSIS — F41.1 GAD (GENERALIZED ANXIETY DISORDER): ICD-10-CM

## 2021-02-09 NOTE — TELEPHONE ENCOUNTER
Prescription approved per [facility] Refill Protocol.  Mich Eaton RN, BSN  [place]  February 9, 2021

## 2021-04-22 NOTE — PROGRESS NOTES
SUBJECTIVE:   CC: Mary Oakley is an 35 year old woman who presents for preventive health visit.       Patient has been advised of split billing requirements and indicates understanding: Yes  Healthy Habits:     Getting at least 3 servings of Calcium per day:  Yes    Bi-annual eye exam:  NO    Dental care twice a year:  NO    Sleep apnea or symptoms of sleep apnea:  None    Diet:  Regular (no restrictions)    Frequency of exercise:  2-3 days/week    Duration of exercise:  15-30 minutes    Taking medications regularly:  Yes    Barriers to taking medications:  None    Medication side effects:  None    PHQ-2 Total Score: 0    Additional concerns today:  No        Today's PHQ-2 Score:   PHQ-2 ( 1999 [employer]) 4/27/2021   Q1: Little interest or pleasure in doing things 0   Q2: Feeling down, depressed or hopeless 0   PHQ-2 Score 0   Q1: Little interest or pleasure in doing things -   Q2: Feeling down, depressed or hopeless -   PHQ-2 Score -       Abuse: Current or Past (Physical, Sexual or Emotional) - No  Do you feel safe in your environment? Yes    Have you ever done Advance Care Planning? (For example, a Health Directive, POLST, or a discussion with a medical provider or your loved ones about your wishes): No, advance care planning information given to patient to review.  Patient declined advance care planning discussion at this time.    Social History     Tobacco Use     Smoking status: Never Smoker     Smokeless tobacco: Never Used   Substance Use Topics     Alcohol use: Yes     Comment: Social Drinker     If you drink alcohol do you typically have >3 drinks per day or >7 drinks per week? No    Alcohol Use 4/23/2021   Prescreen: >3 drinks/day or >7 drinks/week? No   Prescreen: >3 drinks/day or >7 drinks/week? -       Reviewed orders with patient.  Reviewed health maintenance and updated orders accordingly - Yes  Labs reviewed in EPIC  BP Readings from Last 3 Encounters:   04/23/21 124/84   08/15/19 122/74    19 116/70    Wt Readings from Last 3 Encounters:   21 78 kg (172 lb)   08/15/19 73.2 kg (161 lb 6.4 oz)   19 72.1 kg (159 lb)                  Patient Active Problem List   Diagnosis     History of  section     MARIAELENA (generalized anxiety disorder)     ASCUS of cervix with negative high risk HPV     Past Surgical History:   Procedure Laterality Date     COLONOSCOPY       COLONOSCOPY  2013    Procedure: COLONOSCOPY;  colonoscopy;  Surgeon: Moshe Tamez MD;  Location:  GI     ZZC NONSPECIFIC PROCEDURE  as Infant    Trigger Thumb Release       Social History     Tobacco Use     Smoking status: Never Smoker     Smokeless tobacco: Never Used   Substance Use Topics     Alcohol use: Yes     Comment: Social Drinker     Family History   Problem Relation Age of Onset     Heart Disease Paternal Grandmother      Cardiovascular Paternal Grandmother      Cancer Paternal Grandmother         skin cancer     Alzheimer Disease Paternal Grandfather      Asthma Brother          Current Outpatient Medications   Medication Sig Dispense Refill     sertraline (ZOLOFT) 25 MG tablet Take one pill daily for 1 week , half pill daily for  1 week and then half pill every other day for 1-2 weeks and stop 30 tablet 2     fluocinonide (LIDEX) 0.05 % external cream Apply topically 2 times daily 60 g 0     No Known Allergies    Breast Cancer Screening:  Any new diagnosis of family breast, ovarian, or bowel cancer? No    FSH-7: No flowsheet data found.  click delete button to remove this line now  Patient under 40 years of age: Routine Mammogram Screening not recommended.   Pertinent mammograms are reviewed under the imaging tab.    History of abnormal Pap smear: NO - age 30- 65 PAP every 3 years recommended  PAP / HPV Latest Ref Rng & Units 2021   PAP - ASC-US(A) NIL NIL   HPV 16 DNA NEG:Negative Negative Negative -   HPV 18 DNA NEG:Negative Negative Negative -   OTHER HR HPV NEG:Negative  Negative Negative -     Reviewed and updated as needed this visit by clinical staff  Tobacco  Allergies  Meds   Med Hx  Surg Hx  Fam Hx  Soc Hx        Reviewed and updated as needed this visit by Provider                  Past Medical History:   Diagnosis Date     ASCUS of cervix with negative high risk HPV 04/23/2021     Breast mass     RT  - Fibrocystic - 10 O'clock     Lymphoid hyperplasia     Colon     Scoliosis       Past Surgical History:   Procedure Laterality Date     COLONOSCOPY  4/02     COLONOSCOPY  4/22/2013    Procedure: COLONOSCOPY;  colonoscopy;  Surgeon: Moshe Tamez MD;  Location:  GI     [facility] NONSPECIFIC PROCEDURE  as Infant    Trigger Thumb Release       Review of Systems   Constitutional: Negative for chills and fever.   HENT: Negative for congestion, ear pain, hearing loss and sore throat.    Eyes: Negative for pain and visual disturbance.   Respiratory: Negative for cough and shortness of breath.    Cardiovascular: Negative for chest pain, palpitations and peripheral edema.   Gastrointestinal: Negative for abdominal pain, constipation, diarrhea, heartburn, hematochezia and nausea.   Breasts:  Negative for tenderness, breast mass and discharge.   Genitourinary: Negative for dysuria, frequency, genital sores, hematuria, pelvic pain, urgency, vaginal bleeding and vaginal discharge.   Musculoskeletal: Negative for arthralgias, joint swelling and myalgias.   Skin: Negative for rash.   Neurological: Negative for dizziness, weakness, headaches and paresthesias.   Psychiatric/Behavioral: Negative for mood changes. The patient is not nervous/anxious.      CONSTITUTIONAL: NEGATIVE for fever, chills, change in weight  INTEGUMENTARU/SKIN: NEGATIVE for worrisome rashes, moles or lesions  EYES: NEGATIVE for vision changes or irritation  ENT: NEGATIVE for ear, mouth and throat problems  RESP: NEGATIVE for significant cough or SOB  BREAST: NEGATIVE for masses, tenderness or discharge  CV: NEGATIVE for  "chest pain, palpitations or peripheral edema  GI: NEGATIVE for nausea, abdominal pain, heartburn, or change in bowel habits  : NEGATIVE for unusual urinary or vaginal symptoms. Periods are regular.  MUSCULOSKELETAL: NEGATIVE for significant arthralgias or myalgia  NEURO: NEGATIVE for weakness, dizziness or paresthesias  PSYCHIATRIC: NEGATIVE for changes in mood or affect     OBJECTIVE:   /84   Pulse 79   Temp 99.4  F (37.4  C) (Temporal)   Resp 14   Ht 1.718 m (5' 7.64\")   Wt 78 kg (172 lb)   LMP 03/29/2021 (Exact Date)   SpO2 98%   BMI 26.43 kg/m    Physical Exam  GENERAL: healthy, alert and no distress  EYES: Eyes grossly normal to inspection, PERRL and conjunctivae and sclerae normal  HENT: ear canals and TM's normal, nose and mouth without ulcers or lesions  NECK: no adenopathy, no asymmetry, masses, or scars and thyroid normal to palpation  RESP: lungs clear to auscultation - no rales, rhonchi or wheezes  BREAST: normal without masses, tenderness or nipple discharge and no palpable axillary masses or adenopathy  CV: regular rate and rhythm, normal S1 S2, no S3 or S4, no murmur, click or rub, no peripheral edema and peripheral pulses strong  ABDOMEN: soft, nontender, no hepatosplenomegaly, no masses and bowel sounds normal  MS: no gross musculoskeletal defects noted, no edema  SKIN: no suspicious lesions or rashes  NEURO: Normal strength and tone, mentation intact and speech normal  PSYCH: mentation appears normal, affect normal/bright    Diagnostic Test Results:  Labs reviewed in Epic    ASSESSMENT/PLAN:       ICD-10-CM    1. Routine general medical examination at a health care facility  Z00.00    2. Need for hepatitis C screening test  Z11.59 Hepatitis C Screen Reflex to HCV RNA Quant and Genotype   3. Screening for malignant neoplasm of cervix  Z12.4 Pap imaged thin layer screen with HPV - recommended age 30 - 65 years (select HPV order below)     HPV High Risk Types DNA Cervical   4. MARIAELENA " "(generalized anxiety disorder)  F41.1 sertraline (ZOLOFT) 25 MG tablet     Basic metabolic panel  (Ca, Cl, CO2, Creat, Gluc, K, Na, BUN)   5. Screening for lipoid disorders  Z13.220 Lipid panel reflex to direct LDL Fasting       Patient has been advised of split billing requirements and indicates understanding: Yes  COUNSELING:  Reviewed preventive health counseling, as reflected in patient instructions       Regular exercise       Healthy diet/nutrition    Estimated body mass index is 26.43 kg/m  as calculated from the following:    Height as of this encounter: 1.718 m (5' 7.64\").    Weight as of this encounter: 78 kg (172 lb).        She reports that she has never smoked. She has never used smokeless tobacco.      Counseling Resources:  ATP IV Guidelines  Pooled Cohorts Equation Calculator  Breast Cancer Risk Calculator  BRCA-Related Cancer Risk Assessment: FHS-7 Tool  FRAX Risk Assessment  ICSI Preventive Guidelines  Dietary Guidelines for Americans, 2010  USDA's MyPlate  ASA Prophylaxis  Lung CA Screening    Mildred Mckeon MD  [facility]  "

## 2021-04-23 ENCOUNTER — OFFICE VISIT (OUTPATIENT)
Dept: FAMILY MEDICINE | Facility: OTHER | Age: 36
End: 2021-04-23
Payer: COMMERCIAL

## 2021-04-23 VITALS
TEMPERATURE: 99.4 F | OXYGEN SATURATION: 98 % | BODY MASS INDEX: 26.07 KG/M2 | RESPIRATION RATE: 14 BRPM | SYSTOLIC BLOOD PRESSURE: 124 MMHG | WEIGHT: 172 LBS | HEIGHT: 68 IN | HEART RATE: 79 BPM | DIASTOLIC BLOOD PRESSURE: 84 MMHG

## 2021-04-23 DIAGNOSIS — Z11.59 NEED FOR HEPATITIS C SCREENING TEST: ICD-10-CM

## 2021-04-23 DIAGNOSIS — Z12.4 SCREENING FOR MALIGNANT NEOPLASM OF CERVIX: ICD-10-CM

## 2021-04-23 DIAGNOSIS — Z13.220 SCREENING FOR LIPOID DISORDERS: ICD-10-CM

## 2021-04-23 DIAGNOSIS — F41.1 GAD (GENERALIZED ANXIETY DISORDER): ICD-10-CM

## 2021-04-23 DIAGNOSIS — Z00.00 ROUTINE GENERAL MEDICAL EXAMINATION AT A HEALTH CARE FACILITY: Primary | ICD-10-CM

## 2021-04-23 PROBLEM — Z30.011 ENCOUNTER FOR INITIAL PRESCRIPTION OF CONTRACEPTIVE PILLS: Status: RESOLVED | Noted: 2017-01-24 | Resolved: 2021-04-23

## 2021-04-23 LAB
ANION GAP SERPL CALCULATED.3IONS-SCNC: 6 MMOL/L (ref 3–14)
BUN SERPL-MCNC: 10 MG/DL (ref 7–30)
CALCIUM SERPL-MCNC: 9.2 MG/DL (ref 8.5–10.1)
CHLORIDE SERPL-SCNC: 108 MMOL/L (ref 94–109)
CHOLEST SERPL-MCNC: 200 MG/DL
CO2 SERPL-SCNC: 26 MMOL/L (ref 20–32)
CREAT SERPL-MCNC: 0.67 MG/DL (ref 0.52–1.04)
GFR SERPL CREATININE-BSD FRML MDRD: >90 ML/MIN/{1.73_M2}
GLUCOSE SERPL-MCNC: 81 MG/DL (ref 70–99)
HDLC SERPL-MCNC: 66 MG/DL
LDLC SERPL CALC-MCNC: 125 MG/DL
NONHDLC SERPL-MCNC: 134 MG/DL
POTASSIUM SERPL-SCNC: 4.3 MMOL/L (ref 3.4–5.3)
SODIUM SERPL-SCNC: 140 MMOL/L (ref 133–144)
TRIGL SERPL-MCNC: 43 MG/DL

## 2021-04-23 PROCEDURE — 99395 PREV VISIT EST AGE 18-39: CPT | Performed by: FAMILY MEDICINE

## 2021-04-23 PROCEDURE — G0145 SCR C/V CYTO,THINLAYER,RESCR: HCPCS | Performed by: FAMILY MEDICINE

## 2021-04-23 PROCEDURE — 80048 BASIC METABOLIC PNL TOTAL CA: CPT | Performed by: FAMILY MEDICINE

## 2021-04-23 PROCEDURE — 36415 COLL VENOUS BLD VENIPUNCTURE: CPT | Performed by: FAMILY MEDICINE

## 2021-04-23 PROCEDURE — G0124 SCREEN C/V THIN LAYER BY MD: HCPCS | Performed by: PATHOLOGY

## 2021-04-23 PROCEDURE — 86803 HEPATITIS C AB TEST: CPT | Performed by: FAMILY MEDICINE

## 2021-04-23 PROCEDURE — 87624 HPV HI-RISK TYP POOLED RSLT: CPT | Performed by: FAMILY MEDICINE

## 2021-04-23 PROCEDURE — 80061 LIPID PANEL: CPT | Performed by: FAMILY MEDICINE

## 2021-04-23 RX ORDER — SERTRALINE HYDROCHLORIDE 25 MG/1
TABLET, FILM COATED ORAL
Qty: 30 TABLET | Refills: 2 | Status: SHIPPED | OUTPATIENT
Start: 2021-04-23 | End: 2021-05-28

## 2021-04-23 ASSESSMENT — PATIENT HEALTH QUESTIONNAIRE - PHQ9: SUM OF ALL RESPONSES TO PHQ QUESTIONS 1-9: 1

## 2021-04-23 ASSESSMENT — ENCOUNTER SYMPTOMS
COUGH: 0
ABDOMINAL PAIN: 0
DYSURIA: 0
HEADACHES: 0
BREAST MASS: 0
CHILLS: 0
NAUSEA: 0
HEARTBURN: 0
FREQUENCY: 0
PALPITATIONS: 0
FEVER: 0
NERVOUS/ANXIOUS: 0
HEMATOCHEZIA: 0
HEMATURIA: 0
WEAKNESS: 0
CONSTIPATION: 0
SORE THROAT: 0
SHORTNESS OF BREATH: 0
EYE PAIN: 0
PARESTHESIAS: 0
ARTHRALGIAS: 0
DIARRHEA: 0
JOINT SWELLING: 0
DIZZINESS: 0
MYALGIAS: 0

## 2021-04-23 ASSESSMENT — MIFFLIN-ST. JEOR: SCORE: 1512.94

## 2021-04-26 LAB — HCV AB SERPL QL IA: NONREACTIVE

## 2021-04-26 NOTE — RESULT ENCOUNTER NOTE
Ms. Oakley    Your recent test results are attached. Worsening cholesterol levels compared to last test. Normal blood glucose and kidney function. Advise low fat diet and regular exercise to help improve this    If you have any questions or concerns please contact me via My Chart or call the clinic at [phone]     Thank You  Mildred Mckeon MD.

## 2021-04-27 ENCOUNTER — OFFICE VISIT (OUTPATIENT)
Dept: DERMATOLOGY | Facility: CLINIC | Age: 36
End: 2021-04-27
Payer: COMMERCIAL

## 2021-04-27 DIAGNOSIS — B07.9 VERRUCA VULGARIS: Primary | ICD-10-CM

## 2021-04-27 DIAGNOSIS — Z87.898 HISTORY OF ATYPICAL NEVUS: ICD-10-CM

## 2021-04-27 DIAGNOSIS — D48.5 NEOPLASM OF UNCERTAIN BEHAVIOR OF SKIN: ICD-10-CM

## 2021-04-27 DIAGNOSIS — L28.0 LICHEN SIMPLEX CHRONICUS: ICD-10-CM

## 2021-04-27 DIAGNOSIS — D18.01 CHERRY ANGIOMA: ICD-10-CM

## 2021-04-27 DIAGNOSIS — L98.9 SKIN LESION: ICD-10-CM

## 2021-04-27 DIAGNOSIS — D22.9 MULTIPLE BENIGN NEVI: ICD-10-CM

## 2021-04-27 DIAGNOSIS — L91.8 ACROCHORDON: ICD-10-CM

## 2021-04-27 LAB
COPATH REPORT: ABNORMAL
PAP: ABNORMAL

## 2021-04-27 PROCEDURE — 99212 OFFICE O/P EST SF 10 MIN: CPT | Mod: 25 | Performed by: DERMATOLOGY

## 2021-04-27 PROCEDURE — 88305 TISSUE EXAM BY PATHOLOGIST: CPT | Performed by: DERMATOLOGY

## 2021-04-27 PROCEDURE — 17110 DESTRUCTION B9 LES UP TO 14: CPT | Performed by: DERMATOLOGY

## 2021-04-27 PROCEDURE — 11102 TANGNTL BX SKIN SINGLE LES: CPT | Mod: XS | Performed by: DERMATOLOGY

## 2021-04-27 RX ORDER — FLUOCINONIDE 0.5 MG/G
CREAM TOPICAL 2 TIMES DAILY
Qty: 60 G | Refills: 0 | Status: SHIPPED | OUTPATIENT
Start: 2021-04-27 | End: 2022-01-18

## 2021-04-27 ASSESSMENT — PAIN SCALES - GENERAL: PAINLEVEL: NO PAIN (0)

## 2021-04-27 NOTE — LETTER
4/27/2021         RE: Mary Oakley  [address]  [place] [address]        Dear Colleague,    Thank you for referring your patient, Mary Oakley, to the [facility]. Please see a copy of my visit note below.    [facility] Dermatology Note  Encounter Date: Apr 27, 2021  Office Visit     Dermatology Problem List:  1. Halo nevus   -Right arm pigmented macule with depigmented halo s/p biopsy 1/2/2018   2. Atypical nevus   - Mid back with compound melanocytic nevus with mild atypia s/p biopsy 8/8/2016   3. Eczematous dermatitis with fungal elements, right thigh  -s/p terbinafine cream initiated 4/30/2015  4. Hypertrophic scar, neck  -s/p ILK  5. Family history of melanoma - mother  6. Lichenification of the right ankle  - Current tx: Lidex BID x 4 weeks  7. NUB - right antecubital fossa - bx 4/27/21    ____________________________________________    Assessment & Plan:    # Family history of melanoma - mother.  - ABCDEs: Counseled ABCDEs of melanoma: Asymmetry, Border (irregularity), Color (not uniform, changes in color), Diameter (greater than 6 mm which is about the size of a pencil eraser), and Evolving (any changes in preexisting moles).  - Sun protection: Counseled SPF30+ sunscreen, UPF clothing, sun avoidance, tanning bed avoidance.   - Will check face at follow up without make up    # History of atypical nevus and halo nevus: repigmentation on the mid back has had of halo nevi - scar was identified today, does not appear to be re pigmenting today, based on exam. Agree with Dr. Traylor that there is no evidence of re pigmentation.  - Recommend yearly skin exams    # Neoplasm of uncertain behavior on the right antecubital fossa. The differential diagnosis includes DN. Rule out MM   - See procedure note.     # Neoplasm of uncertain behavior on the left abodmen. The differential diagnosis includes DN vs other.   - See procedure note.     # Upper eyelids -  skin tags   - Consider cosmetic removal at follow up    # Multiple clinically benign nevi.    - No further intervention needed.     # Yellow to skin colored 1-2 mm papules coalescing into plaques on the areola, appear to be normal mcgregor gland.    - Follow up with OB/GYN for further evaluation     # Verruca plantaris and plana - left plantar foot x 2, right dorsal hand x 5, right second x 2 and third toe x 2   - See cryo procedure note.   - OK to use OTC sal acid in between treatment when area healed   - Follow up in 4 weeks    # Lichenification of the right ankle - c/w lichen simplex chronicus and nike nodule- counseled that this is likely due to friction   - Start Lidex BID x 4 weeks  -Reviewed risks of skin thinning.      Procedures Performed:   -Cryotherapy procedure note: After verbal consent and discussion of risks and benefits including but no limited to dyspigmentation/scar, blister, and pain, 11 verruca was(were) treated with 1-2mm freeze border for 2 cycles with liquid nitrogen. Post cryotherapy instructions were provided.     -Shave biopsy: Location(s) right antecubital fossa.  After discussion of benefits and risks including but not limited to bleeding/bruising, pain/swelling, infection, scar, incomplete removal, nerve damage/numbness, recurrence, and non-diagnostic biopsy, written consent, verbal consent and photographs were obtained. Time-out was performed. The area was cleaned with isopropyl alcohol. 0.5mL of 1% lidocaine with epinephrine was injected to obtain adequate anesthesia of each lesion. Shave biopsy was performed. Hemostasis was achieved with aluminium chloride. Vaseline and a sterile dressing were applied. The patient tolerated the procedure and no complications were noted. The patient was provided with verbal and written post care instructions.       Follow-up: 4 week(s) in-person, or earlier for new or changing lesions  1 year for skin check    Staff and Scribe:     Scribe Disclosure:  "  I, Nigel Gonzalez, am serving as a scribe to document services personally performed by this physician, Dr. Tereza Vogel, based on data collection and the provider's statements to me.     Provider Disclosure:   The documentation recorded by the scribe accurately reflects the services I personally performed and the decisions made by me.    Tereza Vogel MD    Department of Dermatology  [facility]: Phone: [phone], Fax:[phone]  [facility]: Phone: [phone], Fax: [phone]      ____________________________________________    CC: Derm Problem (Mary is here today for a skin exam, pt states \"spot on back that has been previously bx, when she went to get removed they provider couldn't find it\")    HPI:  Ms. Mary Oakley is a(n) 35 year old female who presents today as a return patient for a skin exam.    Last seen 5/31/2018 by Dr. Traylor. At that time, an atypical nevus on the mid back was re pigmenting. Plan was for patient to follow up for excision, but patient never returned.    Today, she has concerns about a spot on the back that was previously biopsied. This was unable to be excised at her previous visit, as it was unable to be identified. She does have some warts on her feet that she would like treated. She has been using OTC products as treatment recently.    She also notes her hands are dry and she has bumps on her knuckles. She notes her mother had a melanoma recently. Denies a family history of pancreatic or ovarian cancer    Patient is otherwise feeling well, without additional skin concerns.    Labs Reviewed:  Derm path from 08/082016 reviewed.  Skin, back, mid, biopsy:   - Compound melanocytic nevus with mild atypia - (see description)     Physical Exam:  Vitals: LMP 03/29/2021 (Exact Date)   SKIN: Total skin excluding the undergarment areas was performed. The " exam included the head/face, neck, both arms, chest, back, abdomen, both legs, buttock, digits and/or nails.  - Make up on the face   - No signs of re pigmentation at the mid back  - There is(are) skin colored pedunculated papules on the upper eyelids.  - Multiple regular brown pigmented macules and papules are identified on the trunk and extremities.   - Yellow to skin colored 1-2 mm papules coalescing into plaques on the areola  - Lichenification of the right ankle  - There are verrucous papules with thrombosed capillaries interrupting dermatoglyphics on the left plantar foot x 2, right second and third toe x 3.  - Right antecubital fossa: 1.5 mm irregularly pigmented macule  - Left abdomen: 5 mm macule  - No other lesions of concern on areas examined.     Medications:  Current Outpatient Medications   Medication     sertraline (ZOLOFT) 25 MG tablet     No current facility-administered medications for this visit.       Past Medical History:   Patient Active Problem List   Diagnosis     History of  section     MARIAELENA (generalized anxiety disorder)     Past Medical History:   Diagnosis Date     Breast mass     RT  - Fibrocystic - 10 O'clock     Lymphoid hyperplasia     Colon     Scoliosis         CC No referring provider defined for this encounter. on close of this encounter.      Again, thank you for allowing me to participate in the care of your patient.        Sincerely,        Tereza Vogel MD

## 2021-04-27 NOTE — NURSING NOTE
"Mary Oakley's goals for this visit include:   Chief Complaint   Patient presents with     Derm Problem     Mary is here today for a skin exam, pt states \"spot on back that has been previously bx, when she went to get removed they provider couldn't find it\"       She requests these members of her care team be copied on today's visit information:     PCP: Mildred Mckeon    Referring Provider:  No referring provider defined for this encounter.    LMP 03/29/2021 (Exact Date)     Do you need any medication refills at today's visit? No    Fozia Brunson LPN      "

## 2021-04-27 NOTE — NURSING NOTE
The following medication was given:     MEDICATION:  Lidocaine with epinephrine 1% [id number]  ROUTE: SQ  SITE: see procedure note  DOSE: 1.5cc  LOT #: -[id number]  : [place]  EXPIRATION DATE: 02/22  NDC#: 5871-3373-33   Was there drug waste? 1.5cc  Multi-dose vial: Yes    Fozia Brunson LPN  April 27, 2021

## 2021-04-27 NOTE — RESULT ENCOUNTER NOTE
Ms. Oakley    Your recent test results are attached.  Negative hep C test    If you have any questions or concerns please contact me via My Chart or call the clinic at [phone]     Thank You  Mildred Mckeon MD.

## 2021-04-28 LAB
FINAL DIAGNOSIS: NORMAL
HPV HR 12 DNA CVX QL NAA+PROBE: NEGATIVE
HPV16 DNA SPEC QL NAA+PROBE: NEGATIVE
HPV18 DNA SPEC QL NAA+PROBE: NEGATIVE
SPECIMEN DESCRIPTION: NORMAL
SPECIMEN SOURCE CVX/VAG CYTO: NORMAL

## 2021-04-29 PROBLEM — R87.610 ASCUS OF CERVIX WITH NEGATIVE HIGH RISK HPV: Status: ACTIVE | Noted: 2021-04-23

## 2021-05-01 LAB — COPATH REPORT: NORMAL

## 2021-05-07 ENCOUNTER — PATIENT OUTREACH (OUTPATIENT)
Dept: FAMILY MEDICINE | Facility: OTHER | Age: 36
End: 2021-05-07

## 2021-05-28 ENCOUNTER — OFFICE VISIT (OUTPATIENT)
Dept: DERMATOLOGY | Facility: CLINIC | Age: 36
End: 2021-05-28
Payer: COMMERCIAL

## 2021-05-28 DIAGNOSIS — B07.0 VERRUCA PLANTARIS: ICD-10-CM

## 2021-05-28 DIAGNOSIS — B07.9 VERRUCA VULGARIS: Primary | ICD-10-CM

## 2021-05-28 PROCEDURE — 99213 OFFICE O/P EST LOW 20 MIN: CPT | Mod: 25 | Performed by: DERMATOLOGY

## 2021-05-28 PROCEDURE — 17110 DESTRUCTION B9 LES UP TO 14: CPT | Performed by: DERMATOLOGY

## 2021-05-28 NOTE — PROGRESS NOTES
[facility] Dermatology Note  Encounter Date: May 28, 2021  Office Visit     Dermatology Problem List:  1. Halo nevus   -Right arm pigmented macule with depigmented halo s/p biopsy 1/2/2018   2. Atypical nevus   - Mid back with compound melanocytic nevus with mild atypia s/p biopsy 8/8/2016   3. Eczematous dermatitis with fungal elements, right thigh  -s/p terbinafine cream initiated 4/30/2015  4. Hypertrophic scar, neck  -s/p ILK  5. Family history of melanoma - mother  6. Lichenification of the right ankle  - Current tx: Lidex BID x 4 weeks  7. Benign biopsy  - Lentiginous junctional melanocytic nevus, right antecubital fossa - s/p bx 4/27/21  - Lentiginous compound melanocytic nevus, left abdomen, s/p bx 4/27/21    ____________________________________________    Assessment & Plan:    # Verruca plantaris and plana - left plantar foot x 4, right dorsal hand x 4, right second toe x 2 and right third toe  x1   - see cryo note  -OTC sal acid once healed    # Lichenification of the right ankle - c/w lichen simplex chronicus and nike nodule  - Still lichenified skin on ankle. Okay to apply bandaid with topical steroid for up to 4 weeks  -Reviewed risks of skin thinning.    # checking face for family hx of melanoma complete and non suspicious lesions today    # History of atypical nevus and halo nevus, recheck Spring 2021    Procedures Performed:   - Cryotherapy procedure note, location(s): right dorsal hand, left plantar foot, right second toe, and right third toe. After verbal consent and discussion of risks and benefits including, but not limited to, dyspigmentation/scar, blister, and pain, 12 lesion(s) was(were) treated with 1-2 mm freeze border for 1-2 cycles with liquid nitrogen. Post cryotherapy instructions were provided.  The left plantar foot was prepped with alcohol, and two lesions were paired with a 15 blade.    Follow-up: one month for repeat cryo  Spring 2021 for next skin check, or  earlier for new or changing lesions    Staff and Scribe:     Scribe Disclosure:   I, Flory Brian, am serving as a scribe to document services personally performed by this physician, Dr. Tereza Vogel, based on data collection and the provider's statements to me.     Provider Disclosure:   The documentation recorded by the scribe accurately reflects the services I personally performed and the decisions made by me.    Tereza Vogel MD    Department of Dermatology  [facility]: Phone: [phone], Fax:[phone]  [facility]: Phone: [phone], Fax: [phone]      ____________________________________________    CC: RECHECK (4 week follow up, recheck right ankle, warts on feet)    HPI:  Ms. Mary Oakley is a(n) 35 year old female who presents today as a return patient for cryo and follow-up on right ankle.    Last seen 4/27/21. At that time, two biopsies determined a lentiginous junctional melanocytic nevus on the right antecubital fossa and a lentiginous compound melanocytic nevus on the left abdomen. Cryo was also performed on warts on the left plantar foot x 2, right dorsal hand x 5, right second x 2 and third toe x 2. Patient also had lichenification of the right ankle. Patient was to start Lidex BID for 4 weeks.    Today, patient notes improvement    Patient is otherwise feeling well, without additional skin concerns.    Labs Reviewed:  Derm path from 4/27/21 reviewed.    Physical Exam:  Vitals: There were no vitals taken for this visit.  SKIN: Focused examination of both feet, right ankle, and right hand was performed.  - There are verrucous papules with thrombosed capillaries interrupting dermatoglyphics on the left plantar foot x4, right second toe x3, right third toe x1, and right dorsal hand x4.   - Lichenified skin on the right ankle  - Scars on the right dorsal hand at the  site of previous verruca plana  - No other lesions of concern on areas examined.     Medications:  Current Outpatient Medications   Medication     fluocinonide (LIDEX) 0.05 % external cream     No current facility-administered medications for this visit.       Past Medical History:   Patient Active Problem List   Diagnosis     History of  section     MARIAELENA (generalized anxiety disorder)     ASCUS of cervix with negative high risk HPV     Past Medical History:   Diagnosis Date     ASCUS of cervix with negative high risk HPV 2021     Breast mass     RT  - Fibrocystic - 10 O'clock     Lymphoid hyperplasia     Colon     Scoliosis         CC Referred Self, MD  No address on file on close of this encounter.

## 2021-05-28 NOTE — LETTER
5/28/2021         RE: Mary Oakley  [address]  [place] [address]        Dear Colleague,    Thank you for referring your patient, Mary Oakley, to the [facility]. Please see a copy of my visit note below.    [facility] Dermatology Note  Encounter Date: May 28, 2021  Office Visit     Dermatology Problem List:  1. Halo nevus   -Right arm pigmented macule with depigmented halo s/p biopsy 1/2/2018   2. Atypical nevus   - Mid back with compound melanocytic nevus with mild atypia s/p biopsy 8/8/2016   3. Eczematous dermatitis with fungal elements, right thigh  -s/p terbinafine cream initiated 4/30/2015  4. Hypertrophic scar, neck  -s/p ILK  5. Family history of melanoma - mother  6. Lichenification of the right ankle  - Current tx: Lidex BID x 4 weeks  7. Benign biopsy  - Lentiginous junctional melanocytic nevus, right antecubital fossa - s/p bx 4/27/21  - Lentiginous compound melanocytic nevus, left abdomen, s/p bx 4/27/21    ____________________________________________    Assessment & Plan:    # Verruca plantaris and plana - left plantar foot x 4, right dorsal hand x 4, right second toe x 2 and right third toe  x1   - see cryo note  -OTC sal acid once healed    # Lichenification of the right ankle - c/w lichen simplex chronicus and nike nodule  - Still lichenified skin on ankle. Okay to apply bandaid with topical steroid for up to 4 weeks  -Reviewed risks of skin thinning.    # checking face for family hx of melanoma complete and non suspicious lesions today    # History of atypical nevus and halo nevus, recheck Spring 2021    Procedures Performed:   - Cryotherapy procedure note, location(s): right dorsal hand, left plantar foot, right second toe, and right third toe. After verbal consent and discussion of risks and benefits including, but not limited to, dyspigmentation/scar, blister, and pain, 12 lesion(s) was(were) treated with 1-2 mm freeze border  for 1-2 cycles with liquid nitrogen. Post cryotherapy instructions were provided.  The left plantar foot was prepped with alcohol, and two lesions were paired with a 15 blade.    Follow-up: one month for repeat cryo  Spring 2021 for next skin check, or earlier for new or changing lesions    Staff and Scribe:     Scribe Disclosure:   I, Flory Roc, am serving as a scribe to document services personally performed by this physician, Dr. Tereza Vogel, based on data collection and the provider's statements to me.     Provider Disclosure:   The documentation recorded by the scribe accurately reflects the services I personally performed and the decisions made by me.    Tereza Vogel MD    Department of Dermatology  [facility]: Phone: [phone], Fax:[phone]  [facility]: Phone: [phone], Fax: [phone]      ____________________________________________    CC: RECHECK (4 week follow up, recheck right ankle, warts on feet)    HPI:  Ms. Mary Oakley is a(n) 35 year old female who presents today as a return patient for cryo and follow-up on right ankle.    Last seen 4/27/21. At that time, two biopsies determined a lentiginous junctional melanocytic nevus on the right antecubital fossa and a lentiginous compound melanocytic nevus on the left abdomen. Cryo was also performed on warts on the left plantar foot x 2, right dorsal hand x 5, right second x 2 and third toe x 2. Patient also had lichenification of the right ankle. Patient was to start Lidex BID for 4 weeks.    Today, patient notes improvement    Patient is otherwise feeling well, without additional skin concerns.    Labs Reviewed:  Derm path from 4/27/21 reviewed.    Physical Exam:  Vitals: There were no vitals taken for this visit.  SKIN: Focused examination of both feet, right ankle, and right hand was performed.  - There are  verrucous papules with thrombosed capillaries interrupting dermatoglyphics on the left plantar foot x4, right second toe x3, right third toe x1, and right dorsal hand x4.   - Lichenified skin on the right ankle  - Scars on the right dorsal hand at the site of previous verruca plana  - No other lesions of concern on areas examined.     Medications:  Current Outpatient Medications   Medication     fluocinonide (LIDEX) 0.05 % external cream     No current facility-administered medications for this visit.       Past Medical History:   Patient Active Problem List   Diagnosis     History of  section     MARIAELENA (generalized anxiety disorder)     ASCUS of cervix with negative high risk HPV     Past Medical History:   Diagnosis Date     ASCUS of cervix with negative high risk HPV 2021     Breast mass     RT  - Fibrocystic - 10 O'clock     Lymphoid hyperplasia     Colon     Scoliosis         CC Referred Self, MD  No address on file on close of this encounter.      Again, thank you for allowing me to participate in the care of your patient.        Sincerely,        Tereza Vogel MD

## 2021-05-28 NOTE — NURSING NOTE
Mary Oakley's goals for this visit include:   Chief Complaint   Patient presents with     RECHECK     4 week follow up, recheck right ankle, warts on feet       She requests these members of her care team be copied on today's visit information: no    PCP: Mildred Mckeon    Referring Provider:  Referred Self, MD  No address on file    There were no vitals taken for this visit.    Do you need any medication refills at today's visit? No    Jen Marie LPN

## 2021-09-24 ENCOUNTER — IMMUNIZATION (OUTPATIENT)
Dept: FAMILY MEDICINE | Facility: OTHER | Age: 36
End: 2021-09-24
Payer: COMMERCIAL

## 2021-09-24 DIAGNOSIS — Z23 NEED FOR PROPHYLACTIC VACCINATION AND INOCULATION AGAINST INFLUENZA: Primary | ICD-10-CM

## 2021-09-24 PROCEDURE — 90471 IMMUNIZATION ADMIN: CPT

## 2021-09-24 PROCEDURE — 90686 IIV4 VACC NO PRSV 0.5 ML IM: CPT

## 2022-01-18 ENCOUNTER — OFFICE VISIT (OUTPATIENT)
Dept: FAMILY MEDICINE | Facility: OTHER | Age: 37
End: 2022-01-18
Payer: COMMERCIAL

## 2022-01-18 VITALS
TEMPERATURE: 98.8 F | HEART RATE: 80 BPM | HEIGHT: 68 IN | RESPIRATION RATE: 16 BRPM | SYSTOLIC BLOOD PRESSURE: 112 MMHG | OXYGEN SATURATION: 98 % | DIASTOLIC BLOOD PRESSURE: 80 MMHG | WEIGHT: 158 LBS | BODY MASS INDEX: 23.95 KG/M2

## 2022-01-18 DIAGNOSIS — R07.89 CHEST WALL PAIN: Primary | ICD-10-CM

## 2022-01-18 PROCEDURE — 99214 OFFICE O/P EST MOD 30 MIN: CPT | Performed by: PHYSICIAN ASSISTANT

## 2022-01-18 ASSESSMENT — MIFFLIN-ST. JEOR: SCORE: 1448.8

## 2022-01-18 ASSESSMENT — PAIN SCALES - GENERAL: PAINLEVEL: NO PAIN (0)

## 2022-01-18 NOTE — PROGRESS NOTES
Assessment & Plan     Chest wall pain  Her symptoms do not fit with a cardiac etiology, we did discuss doing an EKG but opted not to.  Patient will try some anti-inflammatories if tolerated and follow-up if symptoms acutely worsen or change.  I would expect resolution of symptoms to continue over the next 1 to 2 weeks.         No follow-ups on file.    TIERA Hughes [facility] VIELKA Hernandez is a 36 year old who presents for the following health issues     History of Present Illness       She eats 2-3 servings of fruits and vegetables daily.She consumes 0 sweetened beverage(s) daily.She exercises with enough effort to increase her heart rate 9 or less minutes per day.  She exercises with enough effort to increase her heart rate 3 or less days per week.   She is taking medications regularly.     She admits she is a very anxious person she admits that she has been very worried about this. She shoveled snow and does work out and thinks maybe this has contributed to her pain.    Chest Pain  Onset/Duration: 1 week ago  Description:   Location: left side, directly under her breast . Never on her right side.  Pain might be across the top of her back too, but is not sure she works at her desk and does have some tension here related to this.  She denies any left breast or axillary pain  Character: achey her symptoms are about 75% improved.  She did have increased discomfort if she laid on her left breast so she would have to rollover this did relieve her symptoms.  Radiation: none  Duration: 10 minutes , minor pain it comes and goes, she does exercise and has never noticed any pain with exertion.  The pain does not increase or decrease with deep breathing  Intensity: moderate  Progression of Symptoms:  improving, about 75% better.  Accompanying Signs & Symptoms:  Shortness of breath: no  Sweating: no  Nausea/vomiting: no  Lightheadedness: no  Palpitations: no  Fever/Chills:  "no  Cough: no           Heartburn: no  History:   Family history of heart disease: no  Tobacco use: no  Previous similar symptoms: no   Precipitating factors:   Worse with exertion: no  Worse with deep breaths: no           Related to eating: no           Better with burping: no  Alleviating factors:   Therapies tried and outcome: none          Review of Systems   Constitutional, HEENT, cardiovascular, pulmonary, gi and gu systems are negative, except as otherwise noted.      Objective    /80   Pulse 80   Temp 98.8  F (37.1  C) (Temporal)   Resp 16   Ht 1.725 m (5' 7.91\")   Wt 71.7 kg (158 lb)   SpO2 98%   BMI 24.09 kg/m    Body mass index is 24.09 kg/m .  Physical Exam   GENERAL: healthy, alert and no distress  NECK: no adenopathy, no asymmetry, masses, or scars and thyroid normal to palpation  RESP: lungs clear to auscultation - no rales, rhonchi or wheezes  BREAST: normal without masses, tenderness or nipple discharge and no palpable axillary masses or adenopathy  CV: regular rate and rhythm, normal S1 S2, no S3 or S4, no murmur, click or rub, no peripheral edema and peripheral pulses strong  CV: No chest wall tenderness noted  ABDOMEN: soft, nontender, no hepatosplenomegaly, no masses and bowel sounds normal  MS: no gross musculoskeletal defects noted, no edema  PSYCH: mentation appears normal, affect normal/bright          "

## 2022-04-06 ENCOUNTER — PATIENT OUTREACH (OUTPATIENT)
Dept: FAMILY MEDICINE | Facility: OTHER | Age: 37
End: 2022-04-06
Payer: COMMERCIAL

## 2022-04-06 DIAGNOSIS — R87.610 ASCUS OF CERVIX WITH NEGATIVE HIGH RISK HPV: ICD-10-CM

## 2022-06-12 ASSESSMENT — ENCOUNTER SYMPTOMS
DYSURIA: 0
DIZZINESS: 0
CHILLS: 0
BREAST MASS: 0
MYALGIAS: 0
HEARTBURN: 0
CONSTIPATION: 1
HEADACHES: 0
PALPITATIONS: 0
DIARRHEA: 0
NERVOUS/ANXIOUS: 0
FREQUENCY: 0
SORE THROAT: 0
PARESTHESIAS: 0
HEMATURIA: 0
FEVER: 0
ABDOMINAL PAIN: 0
JOINT SWELLING: 0
SHORTNESS OF BREATH: 0
COUGH: 0
NAUSEA: 0
ARTHRALGIAS: 0
WEAKNESS: 0
HEMATOCHEZIA: 0
EYE PAIN: 0

## 2022-06-13 ENCOUNTER — OFFICE VISIT (OUTPATIENT)
Dept: FAMILY MEDICINE | Facility: OTHER | Age: 37
End: 2022-06-13
Payer: COMMERCIAL

## 2022-06-13 VITALS
HEART RATE: 70 BPM | WEIGHT: 150 LBS | OXYGEN SATURATION: 98 % | DIASTOLIC BLOOD PRESSURE: 68 MMHG | RESPIRATION RATE: 16 BRPM | SYSTOLIC BLOOD PRESSURE: 110 MMHG | HEIGHT: 68 IN | TEMPERATURE: 99.7 F | BODY MASS INDEX: 22.73 KG/M2

## 2022-06-13 DIAGNOSIS — B37.31 CANDIDIASIS OF VAGINA: ICD-10-CM

## 2022-06-13 DIAGNOSIS — N89.8 VAGINAL DISCHARGE: ICD-10-CM

## 2022-06-13 DIAGNOSIS — Z12.4 CERVICAL CANCER SCREENING: ICD-10-CM

## 2022-06-13 DIAGNOSIS — Z00.01 ENCOUNTER FOR ROUTINE ADULT MEDICAL EXAM WITH ABNORMAL FINDINGS: Primary | ICD-10-CM

## 2022-06-13 LAB
CLUE CELLS: ABNORMAL
TRICHOMONAS, WET PREP: ABNORMAL
WBC'S/HIGH POWER FIELD, WET PREP: ABNORMAL
YEAST, WET PREP: PRESENT

## 2022-06-13 PROCEDURE — 99395 PREV VISIT EST AGE 18-39: CPT | Performed by: FAMILY MEDICINE

## 2022-06-13 PROCEDURE — 99213 OFFICE O/P EST LOW 20 MIN: CPT | Mod: 25 | Performed by: FAMILY MEDICINE

## 2022-06-13 PROCEDURE — 87210 SMEAR WET MOUNT SALINE/INK: CPT | Performed by: FAMILY MEDICINE

## 2022-06-13 PROCEDURE — 87624 HPV HI-RISK TYP POOLED RSLT: CPT | Performed by: FAMILY MEDICINE

## 2022-06-13 PROCEDURE — G0145 SCR C/V CYTO,THINLAYER,RESCR: HCPCS | Performed by: FAMILY MEDICINE

## 2022-06-13 PROCEDURE — 87491 CHLMYD TRACH DNA AMP PROBE: CPT | Performed by: FAMILY MEDICINE

## 2022-06-13 PROCEDURE — 87591 N.GONORRHOEAE DNA AMP PROB: CPT | Performed by: FAMILY MEDICINE

## 2022-06-13 RX ORDER — FLUCONAZOLE 150 MG/1
150 TABLET ORAL ONCE
Qty: 1 TABLET | Refills: 0 | Status: SHIPPED | OUTPATIENT
Start: 2022-06-13 | End: 2022-06-13

## 2022-06-13 ASSESSMENT — ENCOUNTER SYMPTOMS
SHORTNESS OF BREATH: 0
FREQUENCY: 0
ARTHRALGIAS: 0
HEMATOCHEZIA: 0
FEVER: 0
SORE THROAT: 0
JOINT SWELLING: 0
NERVOUS/ANXIOUS: 0
BREAST MASS: 0
CHILLS: 0
DIARRHEA: 0
PARESTHESIAS: 0
EYE PAIN: 0
HEMATURIA: 0
HEARTBURN: 0
ABDOMINAL PAIN: 0
CONSTIPATION: 1
MYALGIAS: 0
WEAKNESS: 0
HEADACHES: 0
COUGH: 0
PALPITATIONS: 0
DYSURIA: 0
DIZZINESS: 0
NAUSEA: 0

## 2022-06-13 ASSESSMENT — PAIN SCALES - GENERAL: PAINLEVEL: NO PAIN (0)

## 2022-06-13 NOTE — PROGRESS NOTES
SUBJECTIVE:   CC: Mary Oakley is an 36 year old woman who presents for preventive health visit.       Patient has been advised of split billing requirements and indicates understanding: Yes  Healthy Habits:     Getting at least 3 servings of Calcium per day:  Yes    Bi-annual eye exam:  NO    Dental care twice a year:  NO    Sleep apnea or symptoms of sleep apnea:  None    Diet:  Regular (no restrictions)    Frequency of exercise:  1 day/week    Duration of exercise:  Less than 15 minutes    Taking medications regularly:  Yes    Medication side effects:  None    PHQ-2 Total Score: 1    Additional concerns today:  Yes  Like to discuss vaginal concerns today.  Noted to have itching and increased vaginal discharge.  Denies any new sexual partners.  Denies any associated urinary symptoms with this .  -------------------------------------    Today's PHQ-2 Score:   PHQ-2 ( 1999 [employer]) 6/12/2022   Q1: Little interest or pleasure in doing things 0   Q2: Feeling down, depressed or hopeless 1   PHQ-2 Score 1   PHQ-2 Total Score (12-17 Years)- Positive if 3 or more points; Administer PHQ-A if positive -   Q1: Little interest or pleasure in doing things Not at all   Q2: Feeling down, depressed or hopeless Several days   PHQ-2 Score 1       Abuse: Current or Past (Physical, Sexual or Emotional) - No  Do you feel safe in your environment? Yes        Social History     Tobacco Use     Smoking status: Never Smoker     Smokeless tobacco: Never Used   Substance Use Topics     Alcohol use: Yes     Comment: Social Drinker     If you drink alcohol do you typically have >3 drinks per day or >7 drinks per week? No    Alcohol Use 6/13/2022   Prescreen: >3 drinks/day or >7 drinks/week? -   Prescreen: >3 drinks/day or >7 drinks/week? No       Reviewed orders with patient.  Reviewed health maintenance and updated orders accordingly - Yes  Labs reviewed in EPIC  BP Readings from Last 3 Encounters:   06/13/22 110/68   01/18/22 112/80    21 124/84    Wt Readings from Last 3 Encounters:   22 68 kg (150 lb)   22 71.7 kg (158 lb)   21 78 kg (172 lb)                  Patient Active Problem List   Diagnosis     History of  section     MARIAELENA (generalized anxiety disorder)     ASCUS of cervix with negative high risk HPV     Past Surgical History:   Procedure Laterality Date     COLONOSCOPY       COLONOSCOPY  2013    Procedure: COLONOSCOPY;  colonoscopy;  Surgeon: Moshe Tamez MD;  Location:  GI     [facility] NONSPECIFIC PROCEDURE  as Infant    Trigger Thumb Release       Social History     Tobacco Use     Smoking status: Never Smoker     Smokeless tobacco: Never Used   Substance Use Topics     Alcohol use: Yes     Comment: Social Drinker     Family History   Problem Relation Age of Onset     Anxiety Disorder Mother      Heart Disease Paternal Grandmother      Cardiovascular Paternal Grandmother      Cancer Paternal Grandmother         skin cancer     Alzheimer Disease Paternal Grandfather      Asthma Brother      Anxiety Disorder Brother          No current outpatient medications on file.     No Known Allergies  Recent Labs   Lab Test 21  1415 03/15/19  0821 18  0834 18  1610 07/20/15  0901   * 91 105*  --   --    HDL 66 61 79  --   --    TRIG 43 35 89  --   --    ALT  --  21 13  --   --    CR 0.67 0.78 0.65  --    < >   GFRESTIMATED >90 >90 >90  --    < >   GFRESTBLACK >90 >90 >90  --    < >   POTASSIUM 4.3 4.5 3.9  --    < >   TSH  --   --   --  1.80  --     < > = values in this interval not displayed.        Breast Cancer Screening:    Breast CA Risk Assessment (FHS-7) 2021   Do you have a family history of breast, colon, or ovarian cancer? No / Unknown       click delete button to remove this line now  Patient under 40 years of age: Routine Mammogram Screening not recommended.   Pertinent mammograms are reviewed under the imaging tab.    History of abnormal Pap smear: ASCUS   PAP / HPV  "Latest Ref Rng & Units 4/23/2021 1/24/2017 5/29/2014   PAP (Historical) - ASC-US(A) NIL NIL   HPV16 NEG:Negative Negative Negative -   HPV18 NEG:Negative Negative Negative -   HRHPV NEG:Negative Negative Negative -     Reviewed and updated as needed this visit by clinical staff   Tobacco  Allergies  Meds   Med Hx  Surg Hx  Fam Hx  Soc Hx          Reviewed and updated as needed this visit by Provider     Meds               Past Medical History:   Diagnosis Date     ASCUS of cervix with negative high risk HPV 04/23/2021     Breast mass     RT  - Fibrocystic - 10 O'clock     Lymphoid hyperplasia     Colon     Scoliosis       Past Surgical History:   Procedure Laterality Date     COLONOSCOPY  4/02     COLONOSCOPY  4/22/2013    Procedure: COLONOSCOPY;  colonoscopy;  Surgeon: Moshe Tamez MD;  Location:  GI     [facility] NONSPECIFIC PROCEDURE  as Infant    Trigger Thumb Release       Review of Systems   Constitutional: Negative for chills and fever.   HENT: Negative for congestion, ear pain, hearing loss and sore throat.    Eyes: Negative for pain and visual disturbance.   Respiratory: Negative for cough and shortness of breath.    Cardiovascular: Negative for chest pain, palpitations and peripheral edema.   Gastrointestinal: Positive for constipation. Negative for abdominal pain, diarrhea, heartburn, hematochezia and nausea.   Breasts:  Negative for tenderness, breast mass and discharge.   Genitourinary: Positive for vaginal discharge. Negative for dysuria, frequency, genital sores, hematuria, pelvic pain, urgency and vaginal bleeding.   Musculoskeletal: Negative for arthralgias, joint swelling and myalgias.   Skin: Negative for rash.   Neurological: Negative for dizziness, weakness, headaches and paresthesias.   Psychiatric/Behavioral: Negative for mood changes. The patient is not nervous/anxious.           OBJECTIVE:   /68   Pulse 70   Temp 99.7  F (37.6  C) (Temporal)   Resp 16   Ht 1.721 m (5' 7.76\")   " Wt 68 kg (150 lb)   LMP 05/20/2022 (Exact Date)   SpO2 98%   BMI 22.97 kg/m    Physical Exam  GENERAL: healthy, alert and no distress  EYES: Eyes grossly normal to inspection, PERRL and conjunctivae and sclerae normal  HENT: ear canals and TM's normal, nose and mouth without ulcers or lesions  NECK: no adenopathy, no asymmetry, masses, or scars and thyroid normal to palpation  RESP: lungs clear to auscultation - no rales, rhonchi or wheezes  BREAST: normal without masses, tenderness or nipple discharge and no palpable axillary masses or adenopathy  CV: regular rate and rhythm, normal S1 S2, no S3 or S4, no murmur, click or rub, no peripheral edema and peripheral pulses strong  ABDOMEN: soft, nontender, no hepatosplenomegaly, no masses and bowel sounds normal  MS: no gross musculoskeletal defects noted, no edema  SKIN: no suspicious lesions or rashes  NEURO: Normal strength and tone, mentation intact and speech normal  PSYCH: mentation appears normal, affect normal/bright  : She is noted to have easily friable cervix and copious amount of greenish vaginal discharge    Diagnostic Test Results:  Labs reviewed in Epic    ASSESSMENT/PLAN:     Problem List Items Addressed This Visit    None     Visit Diagnoses     Encounter for routine adult medical exam with abnormal findings    -  Primary    Cervical cancer screening        Relevant Orders    Pap Screen with HPV - recommended age 30 - 65 years    HPV Hold (Lab Only)    Vaginal discharge        Relevant Orders    Wet prep (Completed)    NEISSERIA GONORRHOEA PCR    CHLAMYDIA TRACHOMATIS PCR    Candidiasis of vagina          Wet prep completed showing signs of yeast.  She was treated with fluconazole.  Will await Pap results.  If noted to have ASCUS again I would recommend colposcopy.    Patient has been advised of split billing requirements and indicates understanding: Yes    COUNSELING:  Reviewed preventive health counseling, as reflected in patient instructions      "  Regular exercise       Healthy diet/nutrition    Estimated body mass index is 22.97 kg/m  as calculated from the following:    Height as of this encounter: 1.721 m (5' 7.76\").    Weight as of this encounter: 68 kg (150 lb).        She reports that she has never smoked. She has never used smokeless tobacco.      Counseling Resources:  ATP IV Guidelines  Pooled Cohorts Equation Calculator  Breast Cancer Risk Calculator  BRCA-Related Cancer Risk Assessment: FHS-7 Tool  FRAX Risk Assessment  ICSI Preventive Guidelines  Dietary Guidelines for Americans, 2010  USDA's MyPlate  ASA Prophylaxis  Lung CA Screening    Mildred Mckeon MD  [facility]  "

## 2022-06-14 LAB
C TRACH DNA SPEC QL NAA+PROBE: NEGATIVE
N GONORRHOEA DNA SPEC QL NAA+PROBE: NEGATIVE

## 2022-06-14 NOTE — RESULT ENCOUNTER NOTE
Ms. Oakley    Your recent test results are attached. Wet prep showed signs of yeast infection. I would recommend treating this an anti fungal medication that I sent to pharmacy    If you have any questions or concerns please contact me via My Chart or call the clinic at [phone]     Thank You  Mildred Mckeon MD.

## 2022-06-14 NOTE — RESULT ENCOUNTER NOTE
Ms. Oakley    Your recent test results are attached.  Negative gonorrhea and chlamydia test    If you have any questions or concerns please contact me via My Chart or call the clinic at [phone]     Thank You  Mildred Mckeon MD.

## 2022-06-15 LAB
BKR LAB AP GYN ADEQUACY: NORMAL
BKR LAB AP GYN INTERPRETATION: NORMAL
BKR LAB AP HPV REFLEX: NORMAL
BKR LAB AP PREVIOUS ABNORMAL: NORMAL
PATH REPORT.COMMENTS IMP SPEC: NORMAL
PATH REPORT.COMMENTS IMP SPEC: NORMAL
PATH REPORT.RELEVANT HX SPEC: NORMAL

## 2022-06-20 LAB
HUMAN PAPILLOMA VIRUS 16 DNA: NEGATIVE
HUMAN PAPILLOMA VIRUS 18 DNA: NEGATIVE
HUMAN PAPILLOMA VIRUS FINAL DIAGNOSIS: NORMAL
HUMAN PAPILLOMA VIRUS OTHER HR: NEGATIVE

## 2022-07-15 ENCOUNTER — TRANSFERRED RECORDS (OUTPATIENT)
Dept: FAMILY MEDICINE | Facility: OTHER | Age: 37
End: 2022-07-15

## 2022-10-07 ENCOUNTER — ALLIED HEALTH/NURSE VISIT (OUTPATIENT)
Dept: FAMILY MEDICINE | Facility: OTHER | Age: 37
End: 2022-10-07
Payer: COMMERCIAL

## 2022-10-07 DIAGNOSIS — Z23 NEED FOR PROPHYLACTIC VACCINATION AND INOCULATION AGAINST INFLUENZA: Primary | ICD-10-CM

## 2022-10-07 PROCEDURE — 99207 PR NO CHARGE NURSE ONLY: CPT

## 2022-10-07 PROCEDURE — 90686 IIV4 VACC NO PRSV 0.5 ML IM: CPT

## 2022-10-07 PROCEDURE — 90471 IMMUNIZATION ADMIN: CPT

## 2023-01-03 ENCOUNTER — OFFICE VISIT (OUTPATIENT)
Dept: FAMILY MEDICINE | Facility: OTHER | Age: 38
End: 2023-01-03
Payer: COMMERCIAL

## 2023-01-03 VITALS
BODY MASS INDEX: 24.04 KG/M2 | SYSTOLIC BLOOD PRESSURE: 130 MMHG | OXYGEN SATURATION: 98 % | DIASTOLIC BLOOD PRESSURE: 60 MMHG | HEART RATE: 110 BPM | WEIGHT: 157 LBS | TEMPERATURE: 98.2 F

## 2023-01-03 DIAGNOSIS — R20.2 PARESTHESIAS: Primary | ICD-10-CM

## 2023-01-03 LAB
ALBUMIN SERPL-MCNC: 4.2 G/DL (ref 3.4–5)
ALP SERPL-CCNC: 61 U/L (ref 40–150)
ALT SERPL W P-5'-P-CCNC: 25 U/L (ref 0–50)
ANION GAP SERPL CALCULATED.3IONS-SCNC: 5 MMOL/L (ref 3–14)
AST SERPL W P-5'-P-CCNC: 10 U/L (ref 0–45)
BASOPHILS # BLD AUTO: 0 10E3/UL (ref 0–0.2)
BASOPHILS NFR BLD AUTO: 1 %
BILIRUB SERPL-MCNC: 0.3 MG/DL (ref 0.2–1.3)
BUN SERPL-MCNC: 13 MG/DL (ref 7–30)
CALCIUM SERPL-MCNC: 9.2 MG/DL (ref 8.5–10.1)
CHLORIDE BLD-SCNC: 106 MMOL/L (ref 94–109)
CO2 SERPL-SCNC: 28 MMOL/L (ref 20–32)
CREAT SERPL-MCNC: 0.65 MG/DL (ref 0.52–1.04)
CRP SERPL-MCNC: <2.9 MG/L (ref 0–8)
EOSINOPHIL # BLD AUTO: 0.1 10E3/UL (ref 0–0.7)
EOSINOPHIL NFR BLD AUTO: 2 %
ERYTHROCYTE [DISTWIDTH] IN BLOOD BY AUTOMATED COUNT: 13.3 % (ref 10–15)
ERYTHROCYTE [SEDIMENTATION RATE] IN BLOOD BY WESTERGREN METHOD: 6 MM/HR (ref 0–20)
GFR SERPL CREATININE-BSD FRML MDRD: >90 ML/MIN/1.73M2
GLUCOSE BLD-MCNC: 90 MG/DL (ref 70–99)
HCT VFR BLD AUTO: 39.2 % (ref 35–47)
HGB BLD-MCNC: 13.1 G/DL (ref 11.7–15.7)
IRON SATN MFR SERPL: 17 % (ref 15–46)
IRON SERPL-MCNC: 53 UG/DL (ref 35–180)
LYMPHOCYTES # BLD AUTO: 1.5 10E3/UL (ref 0.8–5.3)
LYMPHOCYTES NFR BLD AUTO: 24 %
MCH RBC QN AUTO: 28.7 PG (ref 26.5–33)
MCHC RBC AUTO-ENTMCNC: 33.4 G/DL (ref 31.5–36.5)
MCV RBC AUTO: 86 FL (ref 78–100)
MONOCYTES # BLD AUTO: 0.6 10E3/UL (ref 0–1.3)
MONOCYTES NFR BLD AUTO: 9 %
NEUTROPHILS # BLD AUTO: 4.2 10E3/UL (ref 1.6–8.3)
NEUTROPHILS NFR BLD AUTO: 65 %
PLATELET # BLD AUTO: 325 10E3/UL (ref 150–450)
POTASSIUM BLD-SCNC: 3.8 MMOL/L (ref 3.4–5.3)
PROT SERPL-MCNC: 7.7 G/DL (ref 6.8–8.8)
RBC # BLD AUTO: 4.56 10E6/UL (ref 3.8–5.2)
SODIUM SERPL-SCNC: 139 MMOL/L (ref 133–144)
TIBC SERPL-MCNC: 318 UG/DL (ref 240–430)
TSH SERPL DL<=0.005 MIU/L-ACNC: 1.37 MU/L (ref 0.4–4)
WBC # BLD AUTO: 6.4 10E3/UL (ref 4–11)

## 2023-01-03 PROCEDURE — 86140 C-REACTIVE PROTEIN: CPT | Performed by: PHYSICIAN ASSISTANT

## 2023-01-03 PROCEDURE — 36415 COLL VENOUS BLD VENIPUNCTURE: CPT | Performed by: PHYSICIAN ASSISTANT

## 2023-01-03 PROCEDURE — 99214 OFFICE O/P EST MOD 30 MIN: CPT | Performed by: PHYSICIAN ASSISTANT

## 2023-01-03 PROCEDURE — 80050 GENERAL HEALTH PANEL: CPT | Performed by: PHYSICIAN ASSISTANT

## 2023-01-03 PROCEDURE — 85652 RBC SED RATE AUTOMATED: CPT | Performed by: PHYSICIAN ASSISTANT

## 2023-01-03 PROCEDURE — 83550 IRON BINDING TEST: CPT | Performed by: PHYSICIAN ASSISTANT

## 2023-01-03 PROCEDURE — 83540 ASSAY OF IRON: CPT | Performed by: PHYSICIAN ASSISTANT

## 2023-01-03 PROCEDURE — 82607 VITAMIN B-12: CPT | Performed by: PHYSICIAN ASSISTANT

## 2023-01-03 ASSESSMENT — PAIN SCALES - GENERAL: PAINLEVEL: NO PAIN (0)

## 2023-01-03 NOTE — PROGRESS NOTES
Assessment & Plan     Paresthesias  Discussed multiple potential etiologies for her symptoms including carpal tunnel, cubital tunnel, cervical raidculopathy or lumbar raidculopathy, raynaud's, a thyroid disorder, endocrine disorder, vitamin deficiency, autoimmune, or anemia. History and physical exam today does not demonstrate a clear etiology. Will run a comprehensive lab work up to evaluate further for a potential cause.    In the meantime, advised the patient to try sleeping with her arms extended at night as much as possible. She can try using wrist braces if needed. Encouraged her to start a 1 week course of ibuprofen, 2-3 tablets 3 x daily x 1 week.  If improving it is likely due to nerve entrapment and may want to see physical therapy, do EMGs, or see Ortho. If not improving will continue with work-up.     - Comprehensive metabolic panel (BMP + Alb, Alk Phos, ALT, AST, Total. Bili, TP); Future  - CBC with platelets and differential; Future  - TSH with free T4 reflex; Future  - Iron and iron binding capacity; Future  - Vitamin B12; Future  - CRP, inflammation; Future  - ESR: Erythrocyte sedimentation rate; Future  - Comprehensive metabolic panel (BMP + Alb, Alk Phos, ALT, AST, Total. Bili, TP)  - CBC with platelets and differential  - TSH with free T4 reflex  - Iron and iron binding capacity  - Vitamin B12  - CRP, inflammation  - ESR: Erythrocyte sedimentation rate    Will follow up with results from her labs.        Return in about 2 weeks (around 1/17/2023) for If not improving, sooner if worse or new concerns.    Options for treatment and follow-up care were reviewed with the patient and/or guardian. Patient and/or guardian engaged in the decision making process and verbalized understanding of the options discussed and agreed with the final plan.    IPaty PA-C, was present with the Physician Assistant student who participated in the service and in the documentation of the note.  I have  "verified the history and personally performed the physical exam and medical decision making.  I agree with the assessment and plan of care as documented in the note.     MELVI Orellana PA-C [facility] VIELKA Hernandez is a 37 year old, presenting for the following health issues:  Hand Problem (Falling a sleep at night)    Patient has been experiencing \"pins and needles\" in her hands for the past 3-4 weeks, occurring primarily during the night. She has 3 episodes of her hands falling asleep each night, occurring once before sleep, once in the middle of sleep, and upon waking up. She states she has had problems with this issue intermittently in the past, but it has worsened significantly in the past few weeks. She notes additional occasional episode during the day. Each time these episodes occur, she is able to shake out her hands and the feeling resolves with a minute. Patient notes the numbness and tingling is present primarily in her fingers (all 5) and she has feelings of soreness in her palms. She notes she primarily sleeps on her side. She works a computer based job. She did take one dose of ibuprofen yesterday and noticed a questionable improvement in her symptoms.     She additionally notes episodes of tingling in her toes bilaterally occurring intermittently. This is new in the past few weeks, but does not occur as often as the episodes in her hands.     She denies any joint pain or inflammation. No rashes, lesions, or itching. No changes in color or temperature to the skin of her hands and feet. No recent illness. No fevers or chills. No recent injuries or truama. No recent changes to her medications.     Her mother was diagnosed with MS and she is concerned her symptoms are an early sign of MS. She denies any weakness. No headaches. No vision changes including blurred or double vision.    History of Present Illness       Reason for visit:  Hands " "\"falling sleep\" at night  Symptom onset:  3-4 weeks ago  Symptoms include:  Feeling of pins & needles in hands, feet  Symptom intensity:  Mild  Symptom progression:  Worsening  Had these symptoms before:  Yes  Has tried/received treatment for these symptoms:  No    She eats 2-3 servings of fruits and vegetables daily.She consumes 1 sweetened beverage(s) daily.She exercises with enough effort to increase her heart rate 9 or less minutes per day.  She exercises with enough effort to increase her heart rate 3 or less days per week.   She is taking medications regularly.      Review of Systems   Constitutional, HEENT, cardiovascular, pulmonary, gi and gu systems are negative, except as otherwise noted.      Objective    /60 (BP Location: Right arm, Patient Position: Sitting, Cuff Size: Adult Regular)   Pulse 110   Temp 98.2  F (36.8  C) (Temporal)   Wt 71.2 kg (157 lb)   LMP 12/15/2022   SpO2 98%   Breastfeeding No   BMI 24.04 kg/m    Body mass index is 24.04 kg/m .  Physical Exam   GENERAL: healthy, alert and no distress  EYES: Eyes grossly normal to inspection, PERRL and conjunctivae and sclerae normal  NECK: no adenopathy, no asymmetry, masses, or scars and thyroid normal to palpation  CV: regular rate and rhythm, normal S1 S2, no S3 or S4, no murmur, click or rub, peripheral pulses in upper extremeties strong  MS: no gross musculoskeletal defects noted, no edema. Normal ROM bilaterally of elbow, wrist, and fingers. Phalen's test positive with numbness and tingling of fingers 2-4 bilaterally. Reverse Phalen's negative. Tinel's test negative. Spurling's negative. Normal ROM of neck. Cervical ROM normal in all directions, negative cervical compression including spurlings bilaterally.   SKIN: no suspicious lesions or rashes  NEURO: Normal strength and tone of upper extremities.   PSYCH: mentation appears normal, affect normal/bright              "

## 2023-01-04 LAB — VIT B12 SERPL-MCNC: 369 PG/ML (ref 232–1245)

## 2023-04-17 ENCOUNTER — TELEPHONE (OUTPATIENT)
Dept: DERMATOLOGY | Facility: CLINIC | Age: 38
End: 2023-04-17
Payer: COMMERCIAL

## 2023-04-17 NOTE — TELEPHONE ENCOUNTER
[facility] Call Center    Phone Message    May a detailed message be left on voicemail: yes     Reason for Call: Symptoms or Concerns     If patient has red-flag symptoms, warm transfer to triage line    Current symptom or concern: Pt states she has a bumb or something raised under her birthmark- has discolored her birthmark and has grown some. Mom has hx of melanoma     Symptoms have been present for:  4 month(s)    Has patient previously been seen for this? No    By : NA    Date: NA    Are there any new or worsening symptoms? Yes: The bump has grown some in the last 2/3 weeks. Pt does have appt. In September but is very concerned with her moms recent melanoma diagnosis       Action Taken: Other: Derm    Travel Screening: Not Applicable

## 2023-04-17 NOTE — TELEPHONE ENCOUNTER
Offered pt an appointment with IDNERJIT Live 4/19. Tentatively booked; patient will call back to confirm once she verifies childcare.     Nga GASPAR

## 2023-04-19 ENCOUNTER — TELEPHONE (OUTPATIENT)
Dept: FAMILY MEDICINE | Facility: CLINIC | Age: 38
End: 2023-04-19

## 2023-04-19 ENCOUNTER — OFFICE VISIT (OUTPATIENT)
Dept: DERMATOLOGY | Facility: CLINIC | Age: 38
End: 2023-04-19
Payer: COMMERCIAL

## 2023-04-19 DIAGNOSIS — Z80.8 FAMILY HISTORY OF MALIGNANT MELANOMA: ICD-10-CM

## 2023-04-19 DIAGNOSIS — D18.01 CHERRY ANGIOMA: ICD-10-CM

## 2023-04-19 DIAGNOSIS — D48.9 NEOPLASM OF UNCERTAIN BEHAVIOR: Primary | ICD-10-CM

## 2023-04-19 DIAGNOSIS — L91.8 SKIN TAG: ICD-10-CM

## 2023-04-19 DIAGNOSIS — D22.9 MULTIPLE BENIGN NEVI: ICD-10-CM

## 2023-04-19 DIAGNOSIS — B07.9 VERRUCA: ICD-10-CM

## 2023-04-19 PROCEDURE — 88305 TISSUE EXAM BY PATHOLOGIST: CPT | Performed by: DERMATOLOGY

## 2023-04-19 PROCEDURE — 11104 PUNCH BX SKIN SINGLE LESION: CPT | Performed by: PHYSICIAN ASSISTANT

## 2023-04-19 PROCEDURE — 99214 OFFICE O/P EST MOD 30 MIN: CPT | Mod: 25 | Performed by: PHYSICIAN ASSISTANT

## 2023-04-19 ASSESSMENT — PAIN SCALES - GENERAL: PAINLEVEL: NO PAIN (0)

## 2023-04-19 NOTE — LETTER
4/19/2023         RE: Mary Oakley  [address]  [place] [address]        Dear Colleague,    Thank you for referring your patient, Mary Oakley, to the [facility]. Please see a copy of my visit note below.    [facility] Dermatology Note  Encounter Date: Apr 19, 2023  Office Visit      Dermatology Problem List:  0. NUB - L hip - s/p punch bx 4/19/23  1. Halo nevus   -Right arm pigmented macule with depigmented halo s/p biopsy 1/2/2018   2. Atypical nevus   - Mid back with compound melanocytic nevus with mild atypia s/p biopsy 8/8/2016   3. Eczematous dermatitis with fungal elements, right thigh  -s/p terbinafine cream initiated 4/30/2015  4. Hypertrophic scar, neck  -s/p ILK  5. Family history of melanoma - mother  6. Lichenification of the right ankle  - Current tx: Lidex BID x 4 weeks  7. Benign biopsy  - Lentiginous junctional melanocytic nevus, right antecubital fossa - s/p bx 4/27/21  - Lentiginous compound melanocytic nevus, left abdomen, s/p bx 4/27/21  ____________________________________________    Assessment & Plan:  # Fhx melanoma - mother  - continue annual skin exams    # Benign findings: multiple benign nevi, cherry angiomas,   - edu on benign etiology  - Signs and Symptoms of non-melanoma skin cancer and ABCDEs of melanoma reviewed with patient. Patient encouraged to perform monthly self skin exams and educated on how to perform them. UV precautions reviewed with patient. Patient was asked about new or changing moles/lesions on body.   - Sunscreen: Apply 20 minutes prior to going outdoors and reapply every two hours, when wet or sweating. We recommend using an SPF 30 or higher, and to use one that is water resistant.     - RTC for changes    # Hx of atypical nevus - mid back  - re pigmentation noted today  - Photograph was obtained for clinical monitoring and inclusion in medical record.  - recommended re excision    # Neoplasm of unspecified  behavior of the skin (D49.2) on the L hip. The differential diagnosis includes DF vs other.   - Punch biopsy performed today (see procedure note(s) below).     # Verruca - guido plantar feet  - OTC txs, has treated in the past without success  - edu on etiology    # skin tags - eyelids  - reassured, discussed etiology    Procedures Performed:   - Punch biopsy procedure note, location(s): L hip. After discussion of benefits and risks including but not limited to bleeding, infection, scar, incomplete removal, recurrence, and non-diagnostic biopsy, written consent and photographs were obtained. The area was cleaned with isopropyl alcohol. 0.5mL of 1% lidocaine with epinephrine was injected to obtain adequate anesthesia and a 4 mm punch biopsy was performed at site(s). 4-0 Prolene sutures were utilized to approximate the epidermal edges. White petrolatum ointment and a bandage was applied to the wound. Explicit verbal and written wound care instructions were provided. The patient left the dermatology clinic in good condition.      Follow-up: 1 year(s) in-person, or earlier for new or changing lesions    Staff:     All risks, benefits and alternatives were discussed with patient.  Patient is in agreement and understands the assessment and plan.  All questions were answered.    Flora Live PA-C, MPAS  [facility] [place]: Phone: [phone], Fax: [phone]  [facility]: Phone: [phone],  Fax: [phone]  [facility]: Phone: [phone], Fax: [phone]  ____________________________________________    CC: Skin Check (FBSC- left hip, right upper back and mid/ lower back. Family hx of MM- mother. )      HPI:  Ms. Mary Oakley is a 37 year old female who presents today as a new patient for FBSE. Fhx melanoma - mother. Notes spot on hip that is changing. No personal hx skin cancer does have hx of DNs in the  past. Was supposed to have recurring atypical nevus re excised in 2018 but unfortunately there was not certainty over where the lesion was so this was put on hold at that time. Patient would like back rechecked for this lesion.    Patient is otherwise feeling well, without additional concerns.    Labs:  none    Physical Exam:  Vitals: There were no vitals taken for this visit.  SKIN: Full skin, which includes the head/face, both arms, chest, back, abdomen,both legs, genitalia and/or groin buttocks, digits and/or nails, was examined.   - Love's skin type II, >100 nevi  - There are dome shaped bright red papules on the trunk.   - Multiple regular brown pigmented macules and papules are identified on the trunk and extremities.   - mid back (per path) - though I would say it is upper mid back - flat scar with re pigmentation at the distal aspect  - 6mm brown papule on the L hip  - verrucous papules on guido plantar feet  - <1mm flesh colored papules guido eyelids  - No other lesions of concern on areas examined.    L hip    needs re excision - previously mild atypia on bx in 2016     needs re excision - previously mild atypia on bx in 2016        Medications:  No current outpatient medications on file.     No current facility-administered medications for this visit.      Past Medical/Surgical History:   Patient Active Problem List   Diagnosis     History of  section     MARIAELENA (generalized anxiety disorder)     ASCUS of cervix with negative high risk HPV     Past Medical History:   Diagnosis Date     ASCUS of cervix with negative high risk HPV 2021     Breast mass     RT  - Fibrocystic - 10 O'clock     Lymphoid hyperplasia     Colon     Scoliosis                         Again, thank you for allowing me to participate in the care of your patient.        Sincerely,        Flora Live PA-C

## 2023-04-19 NOTE — PROGRESS NOTES
[facility] Dermatology Note  Encounter Date: Apr 19, 2023  Office Visit      Dermatology Problem List:  0. NUB - L hip - s/p punch bx 4/19/23  1. Halo nevus   -Right arm pigmented macule with depigmented halo s/p biopsy 1/2/2018   2. Atypical nevus   - Mid back with compound melanocytic nevus with mild atypia s/p biopsy 8/8/2016   3. Eczematous dermatitis with fungal elements, right thigh  -s/p terbinafine cream initiated 4/30/2015  4. Hypertrophic scar, neck  -s/p ILK  5. Family history of melanoma - mother  6. Lichenification of the right ankle  - Current tx: Lidex BID x 4 weeks  7. Benign biopsy  - Lentiginous junctional melanocytic nevus, right antecubital fossa - s/p bx 4/27/21  - Lentiginous compound melanocytic nevus, left abdomen, s/p bx 4/27/21  ____________________________________________    Assessment & Plan:  # Fhx melanoma - mother  - continue annual skin exams    # Benign findings: multiple benign nevi, cherry angiomas,   - edu on benign etiology  - Signs and Symptoms of non-melanoma skin cancer and ABCDEs of melanoma reviewed with patient. Patient encouraged to perform monthly self skin exams and educated on how to perform them. UV precautions reviewed with patient. Patient was asked about new or changing moles/lesions on body.   - Sunscreen: Apply 20 minutes prior to going outdoors and reapply every two hours, when wet or sweating. We recommend using an SPF 30 or higher, and to use one that is water resistant.     - RTC for changes    # Hx of atypical nevus - mid back  - re pigmentation noted today  - Photograph was obtained for clinical monitoring and inclusion in medical record.  - recommended re excision    # Neoplasm of unspecified behavior of the skin (D49.2) on the L hip. The differential diagnosis includes DF vs other.   - Punch biopsy performed today (see procedure note(s) below).     # Verruca - ugido plantar feet  - OTC txs, has treated in the past without success  - edu  on etiology    # skin tags - eyelids  - reassured, discussed etiology    Procedures Performed:   - Punch biopsy procedure note, location(s): L hip. After discussion of benefits and risks including but not limited to bleeding, infection, scar, incomplete removal, recurrence, and non-diagnostic biopsy, written consent and photographs were obtained. The area was cleaned with isopropyl alcohol. 0.5mL of 1% lidocaine with epinephrine was injected to obtain adequate anesthesia and a 4 mm punch biopsy was performed at site(s). 4-0 Prolene sutures were utilized to approximate the epidermal edges. White petrolatum ointment and a bandage was applied to the wound. Explicit verbal and written wound care instructions were provided. The patient left the dermatology clinic in good condition.      Follow-up: 1 year(s) in-person, or earlier for new or changing lesions    Staff:     All risks, benefits and alternatives were discussed with patient.  Patient is in agreement and understands the assessment and plan.  All questions were answered.    Flora Live PA-C, MPAS  [facility] [place]: Phone: [phone], Fax: [phone]  [facility]: Phone: [phone],  Fax: [phone]  [facility]: Phone: [phone], Fax: [phone]  ____________________________________________    CC: Skin Check (FBSC- left hip, right upper back and mid/ lower back. Family hx of MM- mother. )      HPI:  Ms. Mary Oakley is a 37 year old female who presents today as a new patient for FBSE. Fhx melanoma - mother. Notes spot on hip that is changing. No personal hx skin cancer does have hx of DNs in the past. Was supposed to have recurring atypical nevus re excised in 2018 but unfortunately there was not certainty over where the lesion was so this was put on hold at that time. Patient would like back rechecked for this lesion.    Patient is  otherwise feeling well, without additional concerns.    Labs:  none    Physical Exam:  Vitals: There were no vitals taken for this visit.  SKIN: Full skin, which includes the head/face, both arms, chest, back, abdomen,both legs, genitalia and/or groin buttocks, digits and/or nails, was examined.   - Love's skin type II, >100 nevi  - There are dome shaped bright red papules on the trunk.   - Multiple regular brown pigmented macules and papules are identified on the trunk and extremities.   - mid back (per path) - though I would say it is upper mid back - flat scar with re pigmentation at the distal aspect  - 6mm brown papule on the L hip  - verrucous papules on guido plantar feet  - <1mm flesh colored papules guido eyelids  - No other lesions of concern on areas examined.    L hip    needs re excision - previously mild atypia on bx in 2016     needs re excision - previously mild atypia on bx in 2016        Medications:  No current outpatient medications on file.     No current facility-administered medications for this visit.      Past Medical/Surgical History:   Patient Active Problem List   Diagnosis     History of  section     MARIAELENA (generalized anxiety disorder)     ASCUS of cervix with negative high risk HPV     Past Medical History:   Diagnosis Date     ASCUS of cervix with negative high risk HPV 2021     Breast mass     RT  - Fibrocystic - 10 O'clock     Lymphoid hyperplasia     Colon     Scoliosis

## 2023-04-19 NOTE — NURSING NOTE
Mary Oakley's chief complaint for this visit includes:  Chief Complaint   Patient presents with     Skin Check     FBSC- left hip, right upper back and mid/ lower back. Family hx of MM- mother.      PCP: Mildred Mckeon    Referring Provider:  No referring provider defined for this encounter.    There were no vitals taken for this visit.  No Pain (0)      No Known Allergies      Do you need any medication refills at today's visit?  No.     Loly Newsome LPN

## 2023-04-19 NOTE — PATIENT INSTRUCTIONS
Wound Care After a Biopsy    What is a skin biopsy?  A skin biopsy allows the doctor to examine a very small piece of tissue under the microscope to determine the diagnosis and the best treatment for the skin condition. A local anesthetic (numbing medicine)  is injected with a very small needle into the skin area to be tested. A small piece of skin is taken from the area. Sometimes a suture (stitch) is used.     What are the risks of a skin biopsy?  I will experience scar, bleeding, swelling, pain, crusting and redness. I may experience incomplete removal or recurrence. Risks of this procedure are excessive bleeding, bruising, infection, nerve damage, numbness, thick (hypertrophic or keloidal) scar and non-diagnostic biopsy.    How should I care for my wound for the first 24 hours?  Keep the wound dry and covered for 24 hours  If it bleeds, hold direct pressure on the area for 15 minutes. If bleeding does not stop then go to the emergency room  Avoid strenuous exercise the first 1-2 days or as your doctor instructs you    How should I care for the wound after 24 hours?  After 24 hours, remove the bandage  You may bathe or shower as normal  If you had a scalp biopsy, you can shampoo as usual and can use shower water to clean the biopsy site daily  Clean the wound twice a day with gentle soap and water  Do not scrub, be gentle  Apply white petroleum/Vaseline after cleaning the wound with a cotton swab or a clean finger, and keep the site covered with a Bandaid /bandage. Bandages are not necessary with a scalp biopsy  If you are unable to cover the site with a Bandaid /bandage, re-apply ointment 2-3 times a day to keep the site moist. Moisture will help with healing  Avoid strenuous activity for first 1-2 days  Avoid lakes, rivers, pools, and oceans until the stitches are removed or the site is healed    How do I clean my wound?  Wash hands thoroughly with soap or use hand  before all wound care  Clean the  wound with gentle soap and water  Apply white petroleum/Vaseline  to wound after it is clean  Replace the Bandaid /bandage to keep the wound covered for the first few days or as instructed by your doctor  If you had a scalp biopsy, warm shower water to the area on a daily basis should suffice    What should I use to clean my wound?   Cotton-tipped applicators (Qtips )  White petroleum jelly (Vaseline ). Use a clean new container and use Q-tips to apply.  Bandaids   as needed  Gentle soap     How should I care for my wound long term?  Do not get your wound dirty  Keep up with wound care for one week or until the area is healed.  A small scab will form and fall off by itself when the area is completely healed. The area will be red and will become pink in color as it heals. Sun protection is very important for how your scar will turn out. Sunscreen with an SPF 30 or greater is recommended once the area is healed.  If you have stitches, stitches need to be removed in 10-14 days. You may return to our clinic for this or you may have it done locally at your doctor s office.  You should have some soreness but it should be mild and slowly go away over several days. Talk to your doctor about using tylenol for pain,    When should I call my doctor?  If you have increased:   Pain or swelling  Pus or drainage (clear or slightly yellow drainage is ok)  Temperature over 100F  Spreading redness or warmth around wound    When will I hear about my results?  The biopsy results can take 2 weeks to come back.  Your results will automatically release to [employer] before your provider has even reviewed them.  The clinic will call you with the results, send you a [employer] message, or have you schedule a follow-up clinic or phone time to discuss the results.  Contact our clinics if you do not hear from us in 2 weeks.    Who should I call with questions?  [facility]: [phone]  [facility]  [facility]: [phone]  For urgent needs outside of business hours call the [facility] at [phone] and ask for the dermatology resident on call

## 2023-04-19 NOTE — TELEPHONE ENCOUNTER
Flora Live, TIERA  P  Skin Nurse [facility], this patient has a DN on her back with repigmentation, she will need excision - please schedule with me :)       Rhina FERRIS RN  [facility] [place]  [phone]

## 2023-04-19 NOTE — TELEPHONE ENCOUNTER
S/w pt and scheduled procedure with Flora Live on Monday May 22nd at 10:30 am for excision.    Rhina FERRIS RN  [facility] [place]  [phone]

## 2023-04-24 LAB
PATH REPORT.COMMENTS IMP SPEC: NORMAL
PATH REPORT.COMMENTS IMP SPEC: NORMAL
PATH REPORT.FINAL DX SPEC: NORMAL
PATH REPORT.GROSS SPEC: NORMAL
PATH REPORT.MICROSCOPIC SPEC OTHER STN: NORMAL
PATH REPORT.RELEVANT HX SPEC: NORMAL

## 2023-05-15 ENCOUNTER — PATIENT OUTREACH (OUTPATIENT)
Dept: CARE COORDINATION | Facility: CLINIC | Age: 38
End: 2023-05-15
Payer: COMMERCIAL

## 2023-05-16 NOTE — PROGRESS NOTES
DERMATOLOGY EXCISION PROCEDURE NOTE    Dermatology Problem List:  1. Halo nevus   -Right arm pigmented macule with depigmented halo s/p biopsy 1/2/2018   2. Atypical nevus   - Mild atypia, mid back s/p biopsy 8/8/2016 s/p excision 5/22/2023 (repigmentation)  3. Eczematous dermatitis with fungal elements, right thigh  -s/p terbinafine cream initiated 4/30/2015  4. Hypertrophic scar, neck  -s/p ILK  5. Family history of melanoma - mother  6. Lichenification of the right ankle  - Current tx: Lidex BID x 4 weeks  7. Benign biopsy  - Lentiginous junctional melanocytic nevus, right antecubital fossa - s/p bx 4/27/21  - Lentiginous compound melanocytic nevus, left abdomen, s/p bx 4/27/21    DERMATOLOGIC SURGERY REPORT    NAME OF PROCEDURE: PUNCH EXCISION AND INTERMEDIATE CLOSURE    Surgeon: Flora Live PA-C    PREOPERATIVE DIAGNOSIS: dysplastic nevus with mild atypia - biopsied in 2016 at outside clinic. Patient told to have entire lesion excised but she did not do that. So we are going to do that for her today.   POSTOPERATIVE DIAGNOSIS: Same  FINAL EXCISION SIZE: 3mm lesion with 2.5mm margins  TOTAL EXCISED DIAMETER: 8mm  FINAL REPAIR LENGTH:  10mm    INDICATIONS:  This patient presented with a 3mm macule on the mid back (per path report, but actually more in line with R upper back). Excision was indicated. We discussed the principles of treatment and most likely complications including bleeding, infection, wound dehiscence, pain, nerve damage, and scarring. Informed consent was obtained and the patient underwent the procedure as follows.    PROCEDURE:  The patient was taken to the operative suite. The treatment area was anesthetized with 1% lidocaine with 1:254574 epinephrine. The area was washed with Hibiclens, rinsed with saline and draped with sterile towels. The lesion was delineated and excised down to subcutaneous fat using an 8mm punch tool. Hemostasis was obtained by electrocoagulation. With a margin of  2.5mm, the final excision size was 8mm    REPAIR:  In order to repair this defect while maintaining the normal anatomic relations and function, we elected to utilize an intermediate linear closure. Closure was oriented so that the wound was in the patient's natural skin tension lines.  One redundant column were removed by triangulation. Final cutaneous approximation was achieved with 5-0 Monocryl simple running sutures.     The final wound length was 10mm.  A total of <1mL of anesthesia was administered for all surgical sites. Estimated blood loss was less than 1mL for all surgical sites. A sterile pressure dressing was applied and wound care instructions, with a written handout, were given. The patient was discharged alert and ambulatory.    Staff Involved:  Scribe/Staff    Scribe Disclosure:  IEdmond, am serving as a scribe to document services personally performed by Flora Live PA-C based on data collection and the provider's statements to me.   Provider Disclosure:   The documentation recorded by the scribe accurately reflects the services I personally performed and the decisions made by me.    All risks, benefits and alternatives were discussed with patient.  Patient is in agreement and understands the assessment and plan.  All questions were answered.    Flora Live PA-C, MPAS  [facility] [place]: Phone: [phone], Fax: [phone]  [facility]: Phone: [phone],  Fax: [phone]  [facility]: Phone: [phone], Fax: [phone]

## 2023-05-22 ENCOUNTER — OFFICE VISIT (OUTPATIENT)
Dept: FAMILY MEDICINE | Facility: CLINIC | Age: 38
End: 2023-05-22
Payer: COMMERCIAL

## 2023-05-22 DIAGNOSIS — D22.9 ATYPICAL NEVUS: Primary | ICD-10-CM

## 2023-05-22 PROCEDURE — 12031 INTMD RPR S/A/T/EXT 2.5 CM/<: CPT | Performed by: PHYSICIAN ASSISTANT

## 2023-05-22 PROCEDURE — 11401 EXC TR-EXT B9+MARG 0.6-1 CM: CPT | Performed by: PHYSICIAN ASSISTANT

## 2023-05-22 PROCEDURE — 88305 TISSUE EXAM BY PATHOLOGIST: CPT | Performed by: DERMATOLOGY

## 2023-05-22 ASSESSMENT — PAIN SCALES - GENERAL: PAINLEVEL: NO PAIN (0)

## 2023-05-22 NOTE — LETTER
5/22/2023         RE: Mary Oakley  [address]  [place] [address]        Dear Colleague,    Thank you for referring your patient, Mary Oakley, to the [facility]. Please see a copy of my visit note below.    DERMATOLOGY EXCISION PROCEDURE NOTE    Dermatology Problem List:  1. Halo nevus   -Right arm pigmented macule with depigmented halo s/p biopsy 1/2/2018   2. Atypical nevus   - Mild atypia, mid back s/p biopsy 8/8/2016 s/p excision 5/22/2023 (repigmentation)  3. Eczematous dermatitis with fungal elements, right thigh  -s/p terbinafine cream initiated 4/30/2015  4. Hypertrophic scar, neck  -s/p ILK  5. Family history of melanoma - mother  6. Lichenification of the right ankle  - Current tx: Lidex BID x 4 weeks  7. Benign biopsy  - Lentiginous junctional melanocytic nevus, right antecubital fossa - s/p bx 4/27/21  - Lentiginous compound melanocytic nevus, left abdomen, s/p bx 4/27/21    DERMATOLOGIC SURGERY REPORT    NAME OF PROCEDURE: PUNCH EXCISION AND INTERMEDIATE CLOSURE    Surgeon: Flora Live PA-C    PREOPERATIVE DIAGNOSIS: dysplastic nevus with mild atypia - biopsied in 2016 at outside clinic. Patient told to have entire lesion excised but she did not do that. So we are going to do that for her today.   POSTOPERATIVE DIAGNOSIS: Same  FINAL EXCISION SIZE: 3mm lesion with 2.5mm margins  TOTAL EXCISED DIAMETER: 8mm  FINAL REPAIR LENGTH:  10mm    INDICATIONS:  This patient presented with a 3mm macule on the mid back (per path report, but actually more in line with R upper back). Excision was indicated. We discussed the principles of treatment and most likely complications including bleeding, infection, wound dehiscence, pain, nerve damage, and scarring. Informed consent was obtained and the patient underwent the procedure as follows.    PROCEDURE:  The patient was taken to the operative suite. The treatment area was anesthetized with 1% lidocaine with 1:988779  epinephrine. The area was washed with Hibiclens, rinsed with saline and draped with sterile towels. The lesion was delineated and excised down to subcutaneous fat using an 8mm punch tool. Hemostasis was obtained by electrocoagulation. With a margin of 2.5mm, the final excision size was 8mm    REPAIR:  In order to repair this defect while maintaining the normal anatomic relations and function, we elected to utilize an intermediate linear closure. Closure was oriented so that the wound was in the patient's natural skin tension lines.  One redundant column were removed by triangulation. Final cutaneous approximation was achieved with 5-0 Monocryl simple running sutures.     The final wound length was 10mm.  A total of <1mL of anesthesia was administered for all surgical sites. Estimated blood loss was less than 1mL for all surgical sites. A sterile pressure dressing was applied and wound care instructions, with a written handout, were given. The patient was discharged alert and ambulatory.    Staff Involved:  Scribe/Staff    Scribe Disclosure:  Edmond DIAS, am serving as a scribe to document services personally performed by Flora Live PA-C based on data collection and the provider's statements to me.   Provider Disclosure:   The documentation recorded by the scribe accurately reflects the services I personally performed and the decisions made by me.    All risks, benefits and alternatives were discussed with patient.  Patient is in agreement and understands the assessment and plan.  All questions were answered.    Flora Live PA-C, MPAS  [facility] [place]: Phone: [phone], Fax: [phone]  [facility]: Phone: [phone],  Fax: [phone]  [facility]: Phone: [phone], Fax: [phone]            Again, thank you for allowing me to participate in the care of your patient.         Sincerely,        Flora Live PA-C

## 2023-05-22 NOTE — PATIENT INSTRUCTIONS
Excision/Mohs Wound Care Instructions  I will experience scar, altered skin color, bleeding, swelling, pain, crusting and redness. I may experience altered sensation. Risks are excessive bleeding, infection, muscle weakness, thick (hypertrophic or keloidal) scar, and recurrence. A second procedure may be recommended to obtain the best cosmetic or functional result.  Possible complications of any surgical procedure are bleeding, infection, scarring, alteration in skin color and sensation, muscle weakness in the area, wound dehiscence or seperation, or recurrence of the lesion or disease. On occasion, after healing, a secondary procedure or revision may be recommended in order to obtain the best cosmetic or functional result.   After your surgery, a pressure bandage will be placed over the area that has sutures. This will help prevent bleeding. Please follow these instructions until you come back to clinic for suture removal in 14 days, as they will help you to prevent complications as your wound heals.  For the First 48 hours After Surgery:  Leave the pressure bandage on and keep it dry. If it should come loose, you may retape it, but do not take it off.  Relax and take it easy. Do not do any vigorous exercise, heavy lifting, or bending forward. This could cause the wound to bleed.  Post-operative pain is usually mild. You may alternate between 1000 mg of Tylenol (acetaminophen) and 400 mg of Ibuprofen every 4 hours.  Do not take more than 4,000mg of acetaminophen in a 24 hour period or 3200 mg of Ibuprofen in a 24 hr period.  Avoid alcohol and vitamin E as these may increase your tendency to bleed.  You may put an ice pack around the bandaged area for 20 minutes every 2-3 hours. This may help reduce swelling, bruising, and pain. Make sure the ice pack is waterproof so that the pressure bandage does not get wet.   You may see a small amount of drainage or blood on your pressure bandage. This is normal. However, if  drainage or bleeding continues or saturates the bandage, you will need to apply firm pressure over the bandage with a washcloth for 15 minutes. If bleeding continues after applying pressure for 15 minutes then go to the nearest emergency room.  48 Hours After Surgery  Carefully remove the bandage and start daily wound care and dressing changes. You may also now shower and get the wound wet.  Daily Wound Care:  Wash wound with a mild soap and water.  Use caution when washing the wound, be gentle and do not let the forceful shower stream hit the wound directly.  Pat dry.  Apply Vaseline (from a new container or tube) over the suture line with a Q-tip. It is very important to keep the wound continuously moist, as wounds heal best in a moist environment.  Keep the site covered until sutures have either been removed or dissolved.  You can cover it with a Telfa (non-stick) dressing and tape or a band-aid.    If you are unable to keep wound covered, you must apply Vaseline every 2-3 hours (while awake) to ensure it is being kept moist for optimal healing. A dressing overnight is recommended to keep the area moist.  Call Us If:  You have pain that is not controlled with Tylenol/Ibuprofen  You have signs or symptoms of an infection, such as: fever over 100 degrees F, redness, warmth, or foul-smelling or yellow drainage from the wound.  Who should I call with questions?  [facility]: [phone]   [facility]: [phone]  For urgent needs outside of business hours call the [facility] at [phone] and ask to speak with the dermatology resident on call

## 2023-05-30 ENCOUNTER — PATIENT OUTREACH (OUTPATIENT)
Dept: CARE COORDINATION | Facility: CLINIC | Age: 38
End: 2023-05-30
Payer: COMMERCIAL

## 2023-07-15 ENCOUNTER — HEALTH MAINTENANCE LETTER (OUTPATIENT)
Age: 38
End: 2023-07-15

## 2023-08-22 ENCOUNTER — OFFICE VISIT (OUTPATIENT)
Dept: FAMILY MEDICINE | Facility: CLINIC | Age: 38
End: 2023-08-22
Payer: COMMERCIAL

## 2023-08-22 VITALS
BODY MASS INDEX: 24.71 KG/M2 | DIASTOLIC BLOOD PRESSURE: 70 MMHG | WEIGHT: 163 LBS | SYSTOLIC BLOOD PRESSURE: 114 MMHG | HEART RATE: 90 BPM | OXYGEN SATURATION: 99 % | RESPIRATION RATE: 16 BRPM | TEMPERATURE: 99.3 F | HEIGHT: 68 IN

## 2023-08-22 DIAGNOSIS — Z13.220 LIPID SCREENING: ICD-10-CM

## 2023-08-22 DIAGNOSIS — K42.9 UMBILICAL HERNIA WITHOUT OBSTRUCTION AND WITHOUT GANGRENE: ICD-10-CM

## 2023-08-22 DIAGNOSIS — Z00.00 ROUTINE GENERAL MEDICAL EXAMINATION AT A HEALTH CARE FACILITY: Primary | ICD-10-CM

## 2023-08-22 DIAGNOSIS — Z20.818 EXPOSURE TO STREP THROAT: ICD-10-CM

## 2023-08-22 LAB
CHOLEST SERPL-MCNC: 198 MG/DL
DEPRECATED S PYO AG THROAT QL EIA: NEGATIVE
GROUP A STREP BY PCR: NOT DETECTED
HDLC SERPL-MCNC: 64 MG/DL
LDLC SERPL CALC-MCNC: 121 MG/DL
NONHDLC SERPL-MCNC: 134 MG/DL
TRIGL SERPL-MCNC: 63 MG/DL

## 2023-08-22 PROCEDURE — 80061 LIPID PANEL: CPT | Performed by: FAMILY MEDICINE

## 2023-08-22 PROCEDURE — 99395 PREV VISIT EST AGE 18-39: CPT | Performed by: FAMILY MEDICINE

## 2023-08-22 PROCEDURE — 87651 STREP A DNA AMP PROBE: CPT | Performed by: FAMILY MEDICINE

## 2023-08-22 PROCEDURE — 36415 COLL VENOUS BLD VENIPUNCTURE: CPT | Performed by: FAMILY MEDICINE

## 2023-08-22 PROCEDURE — 99213 OFFICE O/P EST LOW 20 MIN: CPT | Mod: 25 | Performed by: FAMILY MEDICINE

## 2023-08-22 ASSESSMENT — ENCOUNTER SYMPTOMS
COUGH: 0
NAUSEA: 0
CHILLS: 0
SHORTNESS OF BREATH: 0
DIARRHEA: 0
SORE THROAT: 0
FEVER: 0
ARTHRALGIAS: 0
NERVOUS/ANXIOUS: 0
ABDOMINAL PAIN: 0
DIZZINESS: 0
CONSTIPATION: 0
PALPITATIONS: 0
DYSURIA: 0
HEMATOCHEZIA: 0
WEAKNESS: 0
MYALGIAS: 0
HEADACHES: 0
EYE PAIN: 0
PARESTHESIAS: 0
JOINT SWELLING: 0
FREQUENCY: 0
BREAST MASS: 0
HEARTBURN: 0
HEMATURIA: 0

## 2023-08-22 NOTE — PROGRESS NOTES
"   SUBJECTIVE:   CC: Mary is an 37 year old who presents for preventive health visit.       2023     9:12 AM   Additional Questions   Roomed by wendie mcbride cma   Accompanied by self         2023     9:12 AM   Patient Reported Additional Medications   Patient reports taking the following new medications n/a       Healthy Habits:     Getting at least 3 servings of Calcium per day:  Yes    Bi-annual eye exam:  NO    Dental care twice a year:  NO    Sleep apnea or symptoms of sleep apnea:  None    Diet:  Regular (no restrictions)    Frequency of exercise:  1 day/week    Duration of exercise:  30-45 minutes    Taking medications regularly:  Yes    Medication side effects:  Not applicable    Additional concerns today:  No        Patient reports exposure to strep.  She reports her 2 daughters were recently diagnosed with strep.  She has some \"funny feeling in her throat\" but no soreness.  No fever.  No cough.  No ear pain.      Social History     Tobacco Use    Smoking status: Never    Smokeless tobacco: Never   Substance Use Topics    Alcohol use: Yes     Comment: Social Drinker             2023     6:59 AM   Alcohol Use   Prescreen: >3 drinks/day or >7 drinks/week? No          No data to display              Reviewed orders with patient.  Reviewed health maintenance and updated orders accordingly - Yes  BP Readings from Last 3 Encounters:   23 114/70   23 130/60   22 110/68    Wt Readings from Last 3 Encounters:   23 73.9 kg (163 lb)   23 71.2 kg (157 lb)   22 68 kg (150 lb)                    Breast Cancer Screenin/23/2021     1:09 PM   Breast CA Risk Assessment (FHS-7)   Do you have a family history of breast, colon, or ovarian cancer? No / Unknown         Patient under 40 years of age: Routine Mammogram Screening not recommended.   Pertinent mammograms are reviewed under the imaging tab.    History of abnormal Pap smear: NO - age 30-65 PAP every 5 years " "with negative HPV co-testing recommended      Latest Ref Rng & Units 6/13/2022    11:42 AM 4/23/2021     1:39 PM 4/23/2021     1:30 PM   PAP / HPV   PAP  Negative for Intraepithelial Lesion or Malignancy (NILM)      PAP (Historical)   ASC-US     HPV 16 DNA Negative Negative   Negative    HPV 18 DNA Negative Negative   Negative    Other HR HPV Negative Negative   Negative      Reviewed and updated as needed this visit by clinical staff    Allergies  Meds              Reviewed and updated as needed this visit by Provider                     Review of Systems   Constitutional:  Negative for chills and fever.   HENT:  Negative for congestion, ear pain, hearing loss and sore throat.    Eyes:  Negative for pain and visual disturbance.   Respiratory:  Negative for cough and shortness of breath.    Cardiovascular:  Negative for chest pain, palpitations and peripheral edema.   Gastrointestinal:  Negative for abdominal pain, constipation, diarrhea, heartburn, hematochezia and nausea.   Breasts:  Negative for tenderness, breast mass and discharge.   Genitourinary:  Negative for dysuria, frequency, genital sores, hematuria, pelvic pain, urgency, vaginal bleeding and vaginal discharge.   Musculoskeletal:  Negative for arthralgias, joint swelling and myalgias.   Skin:  Negative for rash.   Neurological:  Negative for dizziness, weakness, headaches and paresthesias.   Psychiatric/Behavioral:  Negative for mood changes. The patient is not nervous/anxious.           OBJECTIVE:   /70   Pulse 90   Temp 99.3  F (37.4  C)   Resp 16   Ht 1.727 m (5' 8\")   Wt 73.9 kg (163 lb)   LMP 08/10/2023   SpO2 99%   BMI 24.78 kg/m    Physical Exam  GENERAL: healthy, alert and no distress  EYES: Eyes grossly normal to inspection, PERRL and conjunctivae and sclerae normal  HENT: ear canals and TM's normal, nose and mouth without ulcers or lesions  NECK: no adenopathy, no asymmetry, masses, or scars and thyroid normal to " palpation  RESP: lungs clear to auscultation - no rales, rhonchi or wheezes  BREAST: normal without masses, tenderness or nipple discharge and no palpable axillary masses or adenopathy  CV: regular rate and rhythm, normal S1 S2, no S3 or S4, no murmur, click or rub, no peripheral edema and peripheral pulses strong  ABDOMEN: soft, nontender, no hepatosplenomegaly, no masses and bowel sounds normal. Small reducible umbilical hernia.  MS: no gross musculoskeletal defects noted, no edema  SKIN: no suspicious lesions or rashes  NEURO: Normal strength and tone, mentation intact and speech normal  PSYCH: mentation appears normal, affect normal/bright    Diagnostic Test Results:  Labs reviewed in Epic    ASSESSMENT/PLAN:   (Z00.00) Routine general medical examination at a health care facility  (primary encounter diagnosis)  Comment: See counseling messages  Plan: Recheck in 1 year    (Z20.762) Exposure to strep throat  Comment: Patient was exposed to strep.  She has some throat symptoms but mild.  Normal exam.  Would like a strep test.  Plan: Streptococcus A Rapid Screen w/Reflex to PCR -         Clinic Collect        Will notify of results    (Z13.191) Lipid screening  Comment: We will check lipid screening today.  Recent labs otherwise normal in January.  Plan: Lipid panel reflex to direct LDL Non-fasting        Will notify of results.     Umbilical hernia.   No concerns. Consider repair if causing discomfort or if increasing in size.   Discussed the symptoms of incarcerated hernia and that this is emergent. Go to the ED immediately if the area is increasing painful, red or hot.     Patient has been advised of split billing requirements and indicates understanding: No      COUNSELING:  Reviewed preventive health counseling, as reflected in patient instructions       Regular exercise       Healthy diet/nutrition        She reports that she has never smoked. She has never used smokeless tobacco.          Kaykay Jeong,  MD  [facility]

## 2023-08-27 NOTE — TELEPHONE ENCOUNTER
[facility]ime[facility] COLLECTION    Patient Name: Karyn Pickens Date: [birth year]   : 1964 Gender: female   Date of hospital admission: 2023 Date of hospital discharge: 2023   Stroke: Ischemic Discharge disposition: Home     Primary source of information: Patient    Method of patient follow-up: Phone call    Patient's current location: Home    Current therapy status: None currently    1a. At this point in recovery, is the patient able to live alone without any help from another person (is the patient able to bath, use toilet, shop, prepare/get meals, manage finances)? Yes, without any issue (MRS 0)    2. Is the patient able to walk from one room to another without help from another person? Yes    3. Is the patient requiring assistance with any ADL's right now that he/she did not need prior to stroke (ex. Bathing, using the toilet, shopping, preparing/getting meals, managing finances)? No    4. Is the patient able to sit up in bed without any help? Yes    5. Is the patient compliant with the medication therapy that was prescribed at discharge or follow-up visit? Yes    6. Has the patient visited the emergency department since discharge from the hospital? No      7. Has the patient been readmitted to the hospital since discharge from the stroke event? No    8. Depression screening (PHQ9) completed (referral to stroke support group): Negative depression screening. Spoke to patient and she got into her dermatologist that she normally goes to in January so will close encounter.

## 2023-10-12 ENCOUNTER — ALLIED HEALTH/NURSE VISIT (OUTPATIENT)
Dept: FAMILY MEDICINE | Facility: OTHER | Age: 38
End: 2023-10-12
Payer: COMMERCIAL

## 2023-10-12 DIAGNOSIS — Z23 NEED FOR PROPHYLACTIC VACCINATION AND INOCULATION AGAINST INFLUENZA: Primary | ICD-10-CM

## 2023-10-12 PROCEDURE — 99207 PR NO CHARGE NURSE ONLY: CPT

## 2023-10-12 PROCEDURE — 90686 IIV4 VACC NO PRSV 0.5 ML IM: CPT

## 2023-10-12 PROCEDURE — 90471 IMMUNIZATION ADMIN: CPT

## 2023-10-13 ENCOUNTER — TELEPHONE (OUTPATIENT)
Dept: FAMILY MEDICINE | Facility: CLINIC | Age: 38
End: 2023-10-13
Payer: COMMERCIAL

## 2023-10-13 NOTE — TELEPHONE ENCOUNTER
Patient calling stating she received the flu shot yesterday and she wanted to confirm she did NOT get the covid vaccine. RN confirmed it was the flu vaccine. Patient states she feels feverish with chills and aches. RN advised these reactions can be normal after receiving a vaccine as the body absorbs it. RN reviewed red flag symptoms with patient and when to seek emergent care. No further questions or concerns.

## 2024-02-01 ENCOUNTER — OFFICE VISIT (OUTPATIENT)
Dept: FAMILY MEDICINE | Facility: CLINIC | Age: 39
End: 2024-02-01
Payer: COMMERCIAL

## 2024-02-01 VITALS
SYSTOLIC BLOOD PRESSURE: 118 MMHG | BODY MASS INDEX: 24.54 KG/M2 | HEART RATE: 101 BPM | RESPIRATION RATE: 16 BRPM | WEIGHT: 161.9 LBS | HEIGHT: 68 IN | OXYGEN SATURATION: 98 % | DIASTOLIC BLOOD PRESSURE: 80 MMHG | TEMPERATURE: 98 F

## 2024-02-01 DIAGNOSIS — F41.1 GAD (GENERALIZED ANXIETY DISORDER): ICD-10-CM

## 2024-02-01 DIAGNOSIS — S16.1XXA STRAIN OF NECK MUSCLE, INITIAL ENCOUNTER: Primary | ICD-10-CM

## 2024-02-01 PROCEDURE — 99214 OFFICE O/P EST MOD 30 MIN: CPT | Performed by: PHYSICIAN ASSISTANT

## 2024-02-01 RX ORDER — ESCITALOPRAM OXALATE 10 MG/1
10 TABLET ORAL DAILY
Qty: 30 TABLET | Refills: 1 | Status: SHIPPED | OUTPATIENT
Start: 2024-02-01

## 2024-02-01 ASSESSMENT — ANXIETY QUESTIONNAIRES
5. BEING SO RESTLESS THAT IT IS HARD TO SIT STILL: NOT AT ALL
1. FEELING NERVOUS, ANXIOUS, OR ON EDGE: NEARLY EVERY DAY
4. TROUBLE RELAXING: SEVERAL DAYS
6. BECOMING EASILY ANNOYED OR IRRITABLE: SEVERAL DAYS
GAD7 TOTAL SCORE: 11
IF YOU CHECKED OFF ANY PROBLEMS ON THIS QUESTIONNAIRE, HOW DIFFICULT HAVE THESE PROBLEMS MADE IT FOR YOU TO DO YOUR WORK, TAKE CARE OF THINGS AT HOME, OR GET ALONG WITH OTHER PEOPLE: SOMEWHAT DIFFICULT
GAD7 TOTAL SCORE: 11
7. FEELING AFRAID AS IF SOMETHING AWFUL MIGHT HAPPEN: MORE THAN HALF THE DAYS
3. WORRYING TOO MUCH ABOUT DIFFERENT THINGS: MORE THAN HALF THE DAYS
2. NOT BEING ABLE TO STOP OR CONTROL WORRYING: MORE THAN HALF THE DAYS
GAD7 TOTAL SCORE: 11
8. IF YOU CHECKED OFF ANY PROBLEMS, HOW DIFFICULT HAVE THESE MADE IT FOR YOU TO DO YOUR WORK, TAKE CARE OF THINGS AT HOME, OR GET ALONG WITH OTHER PEOPLE?: SOMEWHAT DIFFICULT
7. FEELING AFRAID AS IF SOMETHING AWFUL MIGHT HAPPEN: MORE THAN HALF THE DAYS

## 2024-02-01 ASSESSMENT — PATIENT HEALTH QUESTIONNAIRE - PHQ9: SUM OF ALL RESPONSES TO PHQ QUESTIONS 1-9: 6

## 2024-02-01 ASSESSMENT — PAIN SCALES - GENERAL: PAINLEVEL: MILD PAIN (2)

## 2024-02-01 NOTE — PATIENT INSTRUCTIONS
You have been prescribed a medication to help with mood or anxiety: escitalopram.    Take this medication once daily.     If instructed by your health care provider you may be advised to take a half tablet (half dose) daily for the first few days to reduce side effects and ease into a new medication.     Common side effects are nausea, headache, stomach upset or mild diarrhea. If you find it makes you sleepy, plan to take it at bedtime. Most of these side effects gradually improve over the first week of use.     It can take up to 2 weeks to notice initial benefits from the medication (ie increase in energy), and up to 4-6 weeks for other symptom improvement.     If you experience a worsening in mood or thoughts of self harm, seek help immediately.   Any Emergency Department if in crisis. After hours crisis line at [phone] or [phone]. [place] Crisis Text Line: Text [place] to 248314  or  Suicide LifeLine Chat: suicidepreventionHotelicopterline.org/chat/    Non pharmacological treatment for depression and/or anxiety:  Counseling, Therapy or Cognitive Behavioral Therapy: In combination with medications, talking with a professional therapist can help reduce and improve symptoms of depression or anxiety. I can place a referral for you to arrange counseling through [employer]. You can also call your insurance to find covered therapists in your area. If the first person you see is not a good fit it is ok to try a different counselor.    Regular exercise reduces anxiety and depression symptoms. Being active 30-40 minutes per day 3-5 times per week can improve symptoms.    Reduce or avoid caffeine and alcohol. Caffeine can disrupt sleep and increase anxiety symptoms.  Alcohol has a depressive effect on the brain and works against the action of many medications.    Plan to follow up in 4 weeks.

## 2024-02-01 NOTE — PROGRESS NOTES
Assessment & Plan     Strain of neck muscle, initial encounter  Area of tenderness along SCM left>right.   No rash. No lymphadenopathy. No URI sx and ENT exam normal.   Feels MSK to pt, on improving course. Stretch, OTC meds for discomfort.     MARAIELENA (generalized anxiety disorder)  Previously didn't think sertraline was helpful.  Discussed SSRI medication options, possible side effects, how to take, risks vs benefits, onset of sx improvement and alternatives.   Questions answered  Self cares  Start medication as below.   Follow up 3-4 weeks sooner if worsening. Crises resources discussed.  - escitalopram (LEXAPRO) 10 MG tablet; Take 1 tablet (10 mg) by mouth daily        Follow Up: see above. Additionally patient was instructed to contact clinic for worsening symptoms, non-improvement in time frame discussed, and for questions regarding treatment plan.   For virtual visits, the patient was advised to be seen for in person evaluation if symptoms or condition are worsening or non-improvement as expected.   Marcie Espinoza PA-C      Khoa Hernandez is a 38 year old, presenting for the following health issues:  Musculoskeletal Problem and Anxiety        2/1/2024    11:13 AM   Additional Questions   Roomed by YK   Accompanied by self     History of Present Illness       Mental Health Follow-up:  Patient presents to follow-up on Anxiety.    Patient's anxiety since last visit has been:  Worse  The patient is not having other symptoms associated with anxiety.  Any significant life events: No  Patient is feeling anxious or having panic attacks.  Patient has no concerns about alcohol or drug use.    Reason for visit:  Neck/behind ear pain, lingering underarm ache, anxiety  Symptom onset:  3-7 days ago  Symptoms include:  Neck, behind ear pain; underarm ache  Symptom intensity:  Mild  Symptom progression:  Improving  Had these symptoms before:  No  What makes it worse:  Neck/behind ear pain is improving, keeping appt to  talk through other concerns  What makes it better:  Ibuprofen    She eats 0-1 servings of fruits and vegetables daily.She consumes 1 sweetened beverage(s) daily.She exercises with enough effort to increase her heart rate 9 or less minutes per day.  She exercises with enough effort to increase her heart rate 3 or less days per week.   She is taking medications regularly.     Neck- started 2 days ago. Pain extends behind ears and under jaw and anterior neck both sides. Felt like muscle soreness like I pulled something. Not sure how or what she may have done. Its better than it was. No posterior neck pain, headache, radiation of pain, N/T, fevers, rhinorrhea, ear pain, sore throat, cough, chest congestion.   No recent lifting or change in exercise. No consistent exercise. No falls or injury.    Anxiety - untreated at the moment. Health related anxiety. I worry about cancer   Was on low dose of zoloft for a few years, didn't seem to do much. Its manageable.   If I work out and eat better that helps.   Panic attack not really.  Sleep- broken. Waking in middle of night.   Hard to  be present in the moment and enjoy like I should   Social anxiety.    , supportive family   Kids ages 11 and 8yo.     Armpit aches- this area always sensitive whole life. I dwell on it. People around me getting strange cancers. I worry I will get cancer. No breast health concerns or lumps.           3/5/2018     8:08 AM 3/15/2019     8:34 AM 2/1/2024     9:34 AM   MARIAELENA-7 SCORE   Total Score   11 (moderate anxiety)   Total Score 3 3 11           3/5/2018     8:08 AM 3/15/2019     8:34 AM 4/23/2021     2:06 PM   PHQ   PHQ-9 Total Score 1 1 1   Q9: Thoughts of better off dead/self-harm past 2 weeks Not at all Not at all Not at all         Review of Systems  Constitutional, HEENT, cardiovascular, pulmonary, gi and gu systems are negative, except as otherwise noted.      Objective    /80   Pulse 101   Temp 98  F (36.7  C) (Oral)   Resp  "16    1.732 m (5' 8.19\")   Wt 73.4 kg (161 lb 14.4 oz)   LMP 01/09/2024   SpO2 98%   BMI 24.48 kg/m    Body mass index is 24.48 kg/m .  Physical Exam   GENERAL: alert and no distress  EYES: Eyes grossly normal to inspection, PERRL and conjunctivae and sclerae normal  NECK: no adenopathy, no asymmetry, masses, or scars  RESP: lungs clear to auscultation - no rales, rhonchi or wheezes  BREAST: normal without masses, tenderness or nipple discharge and no palpable axillary masses or adenopathy  CV: regular rate and rhythm, normal S1 S2, no S3 or S4, no murmur, click or rub, no peripheral edema  ABDOMEN: soft, nontender, no hepatosplenomegaly, no masses and bowel sounds normal  MS: area of tenderness behind ears and along SCM muscle left more so than right. Normal Cspine ROM. Normla shoudler ROM. no gross musculoskeletal defects noted, no edema.  5/5.   SKIN: no suspicious lesions or rashes  PSYCH: mentation appears normal, affect anxious, health ruminations, voice mild tremulousness  LYMPH: normal ant/post cervical, supraclavicular nodes, axillary: no adenopathy            Signed Electronically by: Marcie Espinoza PA-C    "

## 2024-04-17 ENCOUNTER — OFFICE VISIT (OUTPATIENT)
Dept: DERMATOLOGY | Facility: CLINIC | Age: 39
End: 2024-04-17
Payer: COMMERCIAL

## 2024-04-17 DIAGNOSIS — D48.9 NEOPLASM OF UNCERTAIN BEHAVIOR: ICD-10-CM

## 2024-04-17 DIAGNOSIS — L57.8 PHOTOAGING OF SKIN: ICD-10-CM

## 2024-04-17 DIAGNOSIS — D22.9 MULTIPLE BENIGN NEVI: Primary | ICD-10-CM

## 2024-04-17 DIAGNOSIS — D18.01 CHERRY ANGIOMA: ICD-10-CM

## 2024-04-17 DIAGNOSIS — Z80.8 FAMILY HISTORY OF MALIGNANT MELANOMA: ICD-10-CM

## 2024-04-17 DIAGNOSIS — B07.9 VERRUCA: ICD-10-CM

## 2024-04-17 PROCEDURE — 11102 TANGNTL BX SKIN SINGLE LES: CPT | Performed by: PHYSICIAN ASSISTANT

## 2024-04-17 PROCEDURE — 99213 OFFICE O/P EST LOW 20 MIN: CPT | Mod: 25 | Performed by: PHYSICIAN ASSISTANT

## 2024-04-17 PROCEDURE — 88305 TISSUE EXAM BY PATHOLOGIST: CPT | Performed by: PATHOLOGY

## 2024-04-17 NOTE — PATIENT INSTRUCTIONS
Patient Education       Proper skin care from [facility]:    -Eliminate harsh soaps as they strip the natural oils from the skin, often resulting in dry itchy skin ( i.e. Dial, Zest, Sloven[place] Spring)  -Use mild soaps such as Cetaphil or Dove Sensitive Skin in the shower. You do not need to use soap on arms, legs, and trunk every time you shower unless visibly soiled.   -Avoid hot or cold showers.  -After showering, lightly dry off and apply moisturizing within 2-3 minutes. This will help trap moisture in the skin.   -Aggressive use of a moisturizer at least 1-2 times a day to the entire body (including -Vanicream, Cetaphil, Aquaphor or Cerave) and moisturize hands after every washing.  -We recommend using moisturizers that come in a tub that needs to be scooped out, not a pump. This has more of an oil base. It will hold moisture in your skin much better than a water base moisturizer. The above recommended are non-pore clogging.      Wear a sunscreen with at least SPF 30 on your face, ears, neck and V of the chest daily. Wear sunscreen on other areas of the body if those areas are exposed to the sun throughout the day. Sunscreens can contain physical and/or chemical blockers. Physical blockers are less likely to clog pores, these include zinc oxide and titanium dioxide. Reapply every two hour and after swimming.     Sunscreen examples: https://www.ewg.org/sunscreen/    UV radiation  UVA radiation remains constant throughout the day and throughout the year. It is a longer wavelength than UVB and therefore penetrates deeper into the skin leading to immediate and delayed tanning, photoaging, and skin cancer. 70-80% of UVA and UVB radiation occurs between the hours of 10am-2pm.  UVB radiation  UVB radiation causes the most harmful effects and is more significant during the summer months. However, snow and ice can reflect UVB radiation leading to skin damage during the winter months as well. UVB radiation is  responsible for tanning, burning, inflammation, delayed erythema (pinkness), pigmentation (brown spots), and skin cancer.     I recommend self monthly full body exams and yearly full body exams with a dermatology provider. If you develop a new or changing lesion please follow up for examination. Most skin cancers are pink and scaly or pink and pearly. However, we do see blue/brown/black skin cancers.  Consider the ABCDEs of melanoma when giving yourself your monthly full body exam ( don't forget the groin, buttocks, feet, toes, etc). A-asymmetry, B-borders, C-color, D-diameter, E-elevation or evolving. If you see any of these changes please follow up in clinic. If you cannot see your back I recommend purchasing a hand held mirror to use with a larger wall mirror.       Checking for Skin Cancer  You can find cancer early by checking your skin each month. There are 3 kinds of skin cancer. They are melanoma, basal cell carcinoma, and squamous cell carcinoma. Doing monthly skin checks is the best way to find new marks or skin changes. Follow the instructions below for checking your skin.   The ABCDEs of checking moles for melanoma   Check your moles or growths for signs of melanoma using ABCDE:   Asymmetry: the sides of the mole or growth don t match  Border: the edges are ragged, notched, or blurred  Color: the color within the mole or growth varies  Diameter: the mole or growth is larger than 6 mm (size of a pencil eraser)  Evolving: the size, shape, or color of the mole or growth is changing (evolving is not shown in the images below)    Checking for other types of skin cancer  Basal cell carcinoma or squamous cell carcinoma have symptoms such as:     A spot or mole that looks different from all other marks on your skin  Changes in how an area feels, such as itching, tenderness, or pain  Changes in the skin's surface, such as oozing, bleeding, or scaliness  A sore that does not heal  New swelling or redness beyond  the border of a mole    Who s at risk?  Anyone can get skin cancer. But you are at greater risk if you have:   Fair skin, light-colored hair, or light-colored eyes  Many moles or abnormal moles on your skin  A history of sunburns from sunlight or tanning beds  A family history of skin cancer  A history of exposure to radiation or chemicals  A weakened immune system  If you have had skin cancer in the past, you are at risk for recurring skin cancer.   How to check your skin  Do your monthly skin checkups in front of a full-length mirror. Check all parts of your body, including your:   Head (ears, face, neck, and scalp)  Torso (front, back, and sides)  Arms (tops, undersides, upper, and lower armpits)  Hands (palms, backs, and fingers, including under the nails)  Buttocks and genitals  Legs (front, back, and sides)  Feet (tops, soles, toes, including under the nails, and between toes)  If you have a lot of moles, take digital photos of them each month. Make sure to take photos both up close and from a distance. These can help you see if any moles change over time.   Most skin changes are not cancer. But if you see any changes in your skin, call your doctor right away. Only he or she can diagnose a problem. If you have skin cancer, seeing your doctor can be the first step toward getting the treatment that could save your life.   [employer] last reviewed this educational content on 4/1/2019 2000-2020 [employer]. [address] [place] [address], [place] [address]. All rights reserved. This information is not intended as a substitute for professional medical care. Always follow your healthcare professional's instructions.       Wound Care After a Biopsy    What is a skin biopsy?  A skin biopsy allows the doctor to examine a very small piece of tissue under the microscope to determine the diagnosis and the best treatment for the skin condition. A local anesthetic (numbing medicine)  is injected with a very small  needle into the skin area to be tested. A small piece of skin is taken from the area. Sometimes a suture (stitch) is used.     What are the risks of a skin biopsy?  I will experience scar, bleeding, swelling, pain, crusting and redness. I may experience incomplete removal or recurrence. Risks of this procedure are excessive bleeding, bruising, infection, nerve damage, numbness, thick (hypertrophic or keloidal) scar and non-diagnostic biopsy.    How should I care for my wound for the first 24 hours?  Keep the wound dry and covered for 24 hours  If it bleeds, hold direct pressure on the area for 15 minutes. If bleeding does not stop then go to the emergency room  Avoid strenuous exercise the first 1-2 days or as your doctor instructs you    How should I care for the wound after 24 hours?  After 24 hours, remove the bandage  You may bathe or shower as normal  If you had a scalp biopsy, you can shampoo as usual and can use shower water to clean the biopsy site daily  Clean the wound twice a day with gentle soap and water  Do not scrub, be gentle  Apply white petroleum/Vaseline after cleaning the wound with a cotton swab or a clean finger, and keep the site covered with a Bandaid /bandage. Bandages are not necessary with a scalp biopsy  If you are unable to cover the site with a Bandaid /bandage, re-apply ointment 2-3 times a day to keep the site moist. Moisture will help with healing  Avoid strenuous activity for first 1-2 days  Avoid lakes, rivers, pools, and oceans until the stitches are removed or the site is healed    How do I clean my wound?  Wash hands thoroughly with soap or use hand  before all wound care  Clean the wound with gentle soap and water  Apply white petroleum/Vaseline  to wound after it is clean  Replace the Bandaid /bandage to keep the wound covered for the first few days or as instructed by your doctor  If you had a scalp biopsy, warm shower water to the area on a daily basis should  suffice    What should I use to clean my wound?   Cotton-tipped applicators (Qtips )  White petroleum jelly (Vaseline ). Use a clean new container and use Q-tips to apply.  Bandaids   as needed  Gentle soap     How should I care for my wound long term?  Do not get your wound dirty  Keep up with wound care for one week or until the area is healed.  A small scab will form and fall off by itself when the area is completely healed. The area will be red and will become pink in color as it heals. Sun protection is very important for how your scar will turn out. Sunscreen with an SPF 30 or greater is recommended once the area is healed.  If you have stitches, stitches need to be removed in 10 days. You may return to our clinic for this or you may have it done locally at your doctor s office.  You should have some soreness but it should be mild and slowly go away over several days. Talk to your doctor about using tylenol for pain,    When should I call my doctor?  If you have increased:   Pain or swelling  Pus or drainage (clear or slightly yellow drainage is ok)  Temperature over 100F  Spreading redness or warmth around wound    When will I hear about my results?  The biopsy results can take 2-3 weeks to come back. The clinic will call you with the results, send you a [employer] message, or have you schedule a follow-up clinic or phone time to discuss the results. Contact our clinics if you do not hear from us in 3 weeks.     Who should I call with questions?  [facility]: [phone]   [facility]: [phone]  For urgent needs outside of business hours call the [facility] at [phone] and ask for the dermatology resident on call

## 2024-04-17 NOTE — PROGRESS NOTES
[facility]ology Note  Encounter Date: Apr 17, 2024  Office Visit      Dermatology Problem List:    #NUB, Mid paraspinal back, S/p Biopsy performed on 4/17/24: Pending results.   1. Halo nevus   -Right arm pigmented macule with depigmented halo s/p biopsy 1/2/2018   2. Atypical nevus   - Mild atypia, mid back s/p biopsy 8/8/2016 s/p excision 5/22/2023 (repigmentation)  3. Eczematous dermatitis with fungal elements, right thigh  -s/p terbinafine cream initiated 4/30/2015  4. Hypertrophic scar, neck  -s/p ILK  5. Lichenification of the right ankle  - Current tx: Lidex BID x 4 weeks  6. Benign biopsy  - Lentiginous junctional melanocytic nevus, right antecubital fossa - s/p bx 4/27/21  - Lentiginous compound melanocytic nevus, left abdomen, s/p bx 4/27/21  7. Verruca - OTC SA product    FHx: melanoma - mother  ____________________________________________    Assessment & Plan:  # Neoplasm of unspecified behavior of the skin (D49.2) on the Mid paraspinal back. The differential diagnosis includes NMSC vs other. .   - Shave biopsy performed today, see procedure note below.   - Photographed today      # Benign findings: multiple benign nevi, lentigines, cherry angiomas  - edu on benign etiology  - Signs and Symptoms of non-melanoma skin cancer and ABCDEs of melanoma reviewed with patient. Patient encouraged to perform monthly self skin exams and educated on how to perform them. UV precautions reviewed with patient. Patient was asked about new or changing moles/lesions on body.   - Sunscreen: Apply 20 minutes prior to going outdoors and reapply every two hours, when wet or sweating. We recommend using an SPF 30 or higher, and to use one that is water resistant.     - RTC for changes    #  Photoaging  - recommended sun protection, at least SPF 30 daily  - discussed use of OTC retinoids    # Verruca - inter webs of toes, responding to OTC SA product  - continue OTC product as this appears to be  benefiting her    # Hx of DN  - Signs and Symptoms of non-melanoma skin cancer and ABCDEs of melanoma reviewed with patient. Patient encouraged to perform monthly self skin exams and educated on how to perform them. UV precautions reviewed with patient. Patient was asked about new or changing moles/lesions on body.   - Sunscreen: Apply 20 minutes prior to going outdoors and reapply every two hours, when wet or sweating. We recommend using an SPF 30 or higher, and to use one that is water resistant.     - Continue annual skin exams    Procedures Performed:   - Shave biopsy procedure note, location(s): see above. After discussion of benefits and risks including but not limited to bleeding, infection, scar, incomplete removal, recurrence, and non-diagnostic biopsy, written consent and photographs were obtained. The area was cleaned with isopropyl alcohol. 0.5mL of 1% lidocaine with epinephrine was injected to obtain adequate anesthesia of lesion(s). Shave biopsy at site(s) performed. Hemostasis was achieved with aluminium chloride. Petrolatum ointment and a sterile dressing were applied. The patient tolerated the procedure and no complications were noted. The patient was provided with verbal and written post care instructions.      Follow-up: pending path results    Staff and scribe:    Scribe Disclosure:   I, REYNA KINNEY, am serving as a scribe; to document services personally performed by Flora Live PA-C -based on data collection and the provider's statements to me.     Provider Disclosure:  I agree with above History, Review of Systems, Physical exam and Plan.  I have reviewed the content of the documentation and have edited it as needed. I have personally performed the services documented here and the documentation accurately represents those services and the decisions I have made.      Electronically signed by:    All risks, benefits and alternatives were discussed with patient.  Patient is in agreement  and understands the assessment and plan.  All questions were answered.    Flora Live PA-C, MPAS  [facility]: Phone: [phone], Fax: [phone]  [facility]: Phone: [phone],  Fax: [phone]  [facility]: Phone: [phone], Fax: [phone]  ____________________________________________    CC: Skin Check (Pt is here for a FBSC. Warts on right foot, moles left armpit, and left hip-recheck)      Reviewed patients past medical history and pertinent chart review prior to patient's visit today.     HPI:  Ms. Mary Oakley is a 38 year old female who presents today as a return patient for FBSE.     Today patient reported a mole on her L armpit. And a spot on her L hip. She also noted warts on her R foot.     Patient is otherwise feeling well, without additional concerns.    Labs:  N/A    Physical Exam:  Vitals: There were no vitals taken for this visit.  SKIN: Full skin, which includes the head/face, both arms, chest, back, abdomen,both legs, genitalia and/or groin buttocks, digits and/or nails, was examined.   - - Love's skin type II, has <100 nevi  - There are dome shaped bright red papules on the trunk.   - Multiple regular brown pigmented macules and papules are identified on the trunk and extremities.   - Scattered brown macules on sun exposed areas   - Mid paraspinal back 4 mm dark brown macule with surrounding tan halo  - verrucous papules inbetween toes on the foot  - No other lesions of concern on areas examined.       Medications:  Current Outpatient Medications   Medication Sig Dispense Refill    escitalopram (LEXAPRO) 10 MG tablet Take 1 tablet (10 mg) by mouth daily 30 tablet 1     No current facility-administered medications for this visit.      Past Medical/Surgical History:   Patient Active Problem List   Diagnosis    History of  section    MARIAELENA (generalized  anxiety disorder)    ASCUS of cervix with negative high risk HPV     Past Medical History:   Diagnosis Date    ASCUS of cervix with negative high risk HPV 04/23/2021    Breast mass     RT  - Fibrocystic - 10 O'clock    Lymphoid hyperplasia     Colon    Scoliosis

## 2024-04-17 NOTE — LETTER
4/17/2024         RE: Mary Oakley  [address]  [place] [place] [facility] [address]        Dear Colleague,    Thank you for referring your patient, Mary Oakley, to the [facility]. Please see a copy of my visit note below.    [facility] Note  Encounter Date: Apr 17, 2024  Office Visit      Dermatology Problem List:    #NUB, Mid paraspinal back, S/p Biopsy performed on 4/17/24: Pending results.   1. Halo nevus   -Right arm pigmented macule with depigmented halo s/p biopsy 1/2/2018   2. Atypical nevus   - Mild atypia, mid back s/p biopsy 8/8/2016 s/p excision 5/22/2023 (repigmentation)  3. Eczematous dermatitis with fungal elements, right thigh  -s/p terbinafine cream initiated 4/30/2015  4. Hypertrophic scar, neck  -s/p ILK  5. Lichenification of the right ankle  - Current tx: Lidex BID x 4 weeks  6. Benign biopsy  - Lentiginous junctional melanocytic nevus, right antecubital fossa - s/p bx 4/27/21  - Lentiginous compound melanocytic nevus, left abdomen, s/p bx 4/27/21  7. Verruca - OTC SA product    FHx: melanoma - mother  ____________________________________________    Assessment & Plan:  # Neoplasm of unspecified behavior of the skin (D49.2) on the Mid paraspinal back. The differential diagnosis includes NMSC vs other. .   - Shave biopsy performed today, see procedure note below.   - Photographed today      # Benign findings: multiple benign nevi, lentigines, cherry angiomas  - edu on benign etiology  - Signs and Symptoms of non-melanoma skin cancer and ABCDEs of melanoma reviewed with patient. Patient encouraged to perform monthly self skin exams and educated on how to perform them. UV precautions reviewed with patient. Patient was asked about new or changing moles/lesions on body.   - Sunscreen: Apply 20 minutes prior to going outdoors and reapply every two hours, when wet or sweating. We recommend using an SPF 30 or higher, and to use one that is water  resistant.     - RTC for changes    #  Photoaging  - recommended sun protection, at least SPF 30 daily  - discussed use of OTC retinoids    # Verruca - inter webs of toes, responding to OTC SA product  - continue OTC product as this appears to be benefiting her    # Hx of DN  - Signs and Symptoms of non-melanoma skin cancer and ABCDEs of melanoma reviewed with patient. Patient encouraged to perform monthly self skin exams and educated on how to perform them. UV precautions reviewed with patient. Patient was asked about new or changing moles/lesions on body.   - Sunscreen: Apply 20 minutes prior to going outdoors and reapply every two hours, when wet or sweating. We recommend using an SPF 30 or higher, and to use one that is water resistant.     - Continue annual skin exams    Procedures Performed:   - Shave biopsy procedure note, location(s): see above. After discussion of benefits and risks including but not limited to bleeding, infection, scar, incomplete removal, recurrence, and non-diagnostic biopsy, written consent and photographs were obtained. The area was cleaned with isopropyl alcohol. 0.5mL of 1% lidocaine with epinephrine was injected to obtain adequate anesthesia of lesion(s). Shave biopsy at site(s) performed. Hemostasis was achieved with aluminium chloride. Petrolatum ointment and a sterile dressing were applied. The patient tolerated the procedure and no complications were noted. The patient was provided with verbal and written post care instructions.      Follow-up: pending path results    Staff and scribe:    Scribe Disclosure:   I, REYNA KINNEY, am serving as a scribe; to document services personally performed by Flora Live PA-C -based on data collection and the provider's statements to me.     Provider Disclosure:  I agree with above History, Review of Systems, Physical exam and Plan.  I have reviewed the content of the documentation and have edited it as needed. I have personally  performed the services documented here and the documentation accurately represents those services and the decisions I have made.      Electronically signed by:    All risks, benefits and alternatives were discussed with patient.  Patient is in agreement and understands the assessment and plan.  All questions were answered.    Flora Live PA-C, MPAS  [facility]: Phone: [phone], Fax: [phone]  [facility]: Phone: [phone],  Fax: [phone]  [facility]: Phone: [phone], Fax: [phone]  ____________________________________________    CC: Skin Check (Pt is here for a FBSC. Warts on right foot, moles left armpit, and left hip-recheck)      Reviewed patients past medical history and pertinent chart review prior to patient's visit today.     HPI:  Ms. Mary Oakley is a 38 year old female who presents today as a return patient for FBSE.     Today patient reported a mole on her L armpit. And a spot on her L hip. She also noted warts on her R foot.     Patient is otherwise feeling well, without additional concerns.    Labs:  N/A    Physical Exam:  Vitals: There were no vitals taken for this visit.  SKIN: Full skin, which includes the head/face, both arms, chest, back, abdomen,both legs, genitalia and/or groin buttocks, digits and/or nails, was examined.   - - Love's skin type II, has <100 nevi  - There are dome shaped bright red papules on the trunk.   - Multiple regular brown pigmented macules and papules are identified on the trunk and extremities.   - Scattered brown macules on sun exposed areas   - Mid paraspinal back 4 mm dark brown macule with surrounding tan halo  - verrucous papules inbetween toes on the foot  - No other lesions of concern on areas examined.       Medications:  Current Outpatient Medications   Medication Sig Dispense Refill     escitalopram (LEXAPRO) 10 MG  tablet Take 1 tablet (10 mg) by mouth daily 30 tablet 1     No current facility-administered medications for this visit.      Past Medical/Surgical History:   Patient Active Problem List   Diagnosis     History of  section     MARIAELENA (generalized anxiety disorder)     ASCUS of cervix with negative high risk HPV     Past Medical History:   Diagnosis Date     ASCUS of cervix with negative high risk HPV 2021     Breast mass     RT  - Fibrocystic - 10 O'clock     Lymphoid hyperplasia     Colon     Scoliosis                         Again, thank you for allowing me to participate in the care of your patient.        Sincerely,        Flora Live PA-C

## 2024-04-17 NOTE — NURSING NOTE
Mary Oakley's goals for this visit include:   Chief Complaint   Patient presents with    Skin Check     Pt is here for a FBSC. Warts on right foot, moles left armpit, and left hip-recheck       She requests these members of her care team be copied on today's visit information:     PCP: [facility]    Referring Provider:  No referring provider defined for this encounter.    There were no vitals taken for this visit.    Do you need any medication refills at today's visit?     Yancy Chairez LPN on 4/17/2024 at 10:44 AM

## 2024-04-17 NOTE — NURSING NOTE
The following medication was given:     MEDICATION:  Lidocaine with epinephrine 1% 1:[id number]  ROUTE: SQ  SITE: see procedure note  DOSE: 1ml  LOT #: [id number]  : [employer]  EXPIRATION DATE: 01/31/2025  NDC#: 69764-515-66  Was there drug waste? Yes  Multi-dose vial: Yes    Nery Khalil  April 17, 2024

## 2024-05-08 ENCOUNTER — TELEPHONE (OUTPATIENT)
Dept: DERMATOLOGY | Facility: CLINIC | Age: 39
End: 2024-05-08
Payer: COMMERCIAL

## 2024-05-08 ENCOUNTER — MYC MEDICAL ADVICE (OUTPATIENT)
Dept: DERMATOLOGY | Facility: CLINIC | Age: 39
End: 2024-05-08
Payer: COMMERCIAL

## 2024-05-08 LAB
PATH REPORT.COMMENTS IMP SPEC: NORMAL
PATH REPORT.FINAL DX SPEC: NORMAL
PATH REPORT.GROSS SPEC: NORMAL
PATH REPORT.MICROSCOPIC SPEC OTHER STN: NORMAL
PATH REPORT.RELEVANT HX SPEC: NORMAL

## 2024-05-08 PROCEDURE — 88342 IMHCHEM/IMCYTCHM 1ST ANTB: CPT | Performed by: PATHOLOGY

## 2024-05-08 PROCEDURE — 88341 IMHCHEM/IMCYTCHM EA ADD ANTB: CPT | Performed by: PATHOLOGY

## 2024-05-08 NOTE — TELEPHONE ENCOUNTER
Excision/Mohs previsit information                                                    Diagnosis: Severe DN  Site(s): mid paraspinal back     Over the counter Chlorhexidine surgical soap to wash all skin below the belly button twice before surgery should be recommended for the following:  - Surgical sites below the waist  - Immunosuppressed  - Previous surgical site infection  - Anticipated wound care challenges    Medication & Allergy Information                                                      Review and update allergy and medication list.    Do you take the following medications:  Coumadin, Eliquis, Pradaxa, Xarelto:  NO   -If on Coumadin, INR should be checked within 7 days of surgery.  Range should be 3.5 or less or within therapeutic range.    Past Medical History                                                    Do you currently or have you previously had any of the following conditions:    Hepatitis:  NO  HIV/AIDS:  NO  Prolonged bleeding or bleeding disorder:  NO  Pacemaker or Defibrillator:  NO.    History of artificial or heart valve replacement:  NO  Endocarditis (inflammation of the inner lining of the heart's chambers and valves):  NO  Have you ever had a prosthetic joint infection:  NO  Pregnant or Breastfeeding:  NO  Mobility device (wheelchair, transfer difficulty): NO    Important Reminders:                                                      Ok to take all of their medications as prescribed  Patients can eat, no need to be fasting  Patient will not be able to get the site wet for 48 hrs  No submerging wound in standing water (lake, pool, bathtub, hot tub) for 2 weeks  No physical activity for 48 hrs (further restrictions will be discussed by MD at time of visit)    If any positives, send to RN for further review  Lizet Chavarria RN   Writer called pt to discuss pathology results. Pt scheduled for excision. Excision checklist complete and all questions were negative. Pt denied having any  questions or concerns at this time.     Lizet Chavarria, RN on 5/8/2024 at 1:09 PM      Final Diagnosis  Skin, Mid paraspinal back, shave:  - Compound dysplastic nevus with moderate to severe atypia - (see comment)  Electronically signed by Hernan Akhtar MD on 5/8/2024 at 11:03 AM        Result Notes     Flora Live PA-C  5/8/2024 11:38 AM CDT Back to Top    Severe DN - mid paraspinal back - will need re-excision. I can do this in [place] or here in [facility] if I have openings. Otherwise she will need to see derm surg for [facility]     Next skin exam in 1 year     Brit

## 2024-06-19 ENCOUNTER — TELEPHONE (OUTPATIENT)
Dept: DERMATOLOGY | Facility: CLINIC | Age: 39
End: 2024-06-19
Payer: COMMERCIAL

## 2024-06-19 NOTE — TELEPHONE ENCOUNTER
[facility] Call Center    Phone Message    May a detailed message be left on voicemail: yes     Reason for Call: Appointment Intake    Referring Provider Name: Flora Live PA-C  Diagnosis and/or Symptoms: Pt would like to reschedule the 7/10/24 procedure visit. Please call back to reschedule. Thank you.     Action Taken: Message routed to:  Adult Clinics: Dermatology p [id number]    Travel Screening: Not Applicable     Date of Service:

## 2024-06-19 NOTE — TELEPHONE ENCOUNTER
The July 10th appointment has been rescheduled for August 21st at 4:30    Yancy Chairez LPN on 6/19/2024 at 10:05 AM

## 2024-07-23 ENCOUNTER — PATIENT OUTREACH (OUTPATIENT)
Dept: CARE COORDINATION | Facility: CLINIC | Age: 39
End: 2024-07-23
Payer: COMMERCIAL

## 2024-08-06 ENCOUNTER — PATIENT OUTREACH (OUTPATIENT)
Dept: CARE COORDINATION | Facility: CLINIC | Age: 39
End: 2024-08-06
Payer: COMMERCIAL

## 2024-08-21 ENCOUNTER — OFFICE VISIT (OUTPATIENT)
Dept: DERMATOLOGY | Facility: CLINIC | Age: 39
End: 2024-08-21
Payer: COMMERCIAL

## 2024-08-21 VITALS — DIASTOLIC BLOOD PRESSURE: 78 MMHG | SYSTOLIC BLOOD PRESSURE: 121 MMHG | OXYGEN SATURATION: 99 % | HEART RATE: 90 BPM

## 2024-08-21 DIAGNOSIS — D23.5 DYSPLASTIC NEVUS OF TRUNK: ICD-10-CM

## 2024-08-21 PROCEDURE — 11402 EXC TR-EXT B9+MARG 1.1-2 CM: CPT | Performed by: PHYSICIAN ASSISTANT

## 2024-08-21 PROCEDURE — 12032 INTMD RPR S/A/T/EXT 2.6-7.5: CPT | Performed by: PHYSICIAN ASSISTANT

## 2024-08-21 PROCEDURE — 88305 TISSUE EXAM BY PATHOLOGIST: CPT | Performed by: DERMATOLOGY

## 2024-08-21 NOTE — LETTER
8/21/2024      Mary Oakley  [address]  [place] [facility] [address]      Dear Colleague,    Thank you for referring your patient, Mary Oakley, to the [facility]. Please see a copy of my visit note below.    DERMATOLOGY EXCISION PROCEDURE NOTE    Dermatology Problem List:  1. Halo nevus   -Right arm pigmented macule with depigmented halo s/p biopsy 1/2/2018   2. Atypical nevus   - Moderate to severe, Mid paraspinal back, bx proven on 4/17/24, S/p Excision on 8/21/24  - Mild atypia, mid back s/p biopsy 8/8/2016 s/p excision 5/22/2023 (repigmentation)  3. Eczematous dermatitis with fungal elements, right thigh  -s/p terbinafine cream initiated 4/30/2015  4. Hypertrophic scar, neck  -s/p ILK  5. Lichenification of the right ankle  - Current tx: Lidex BID x 4 weeks  6. Benign biopsy  - Lentiginous junctional melanocytic nevus, right antecubital fossa - s/p bx 4/27/21  - Lentiginous compound melanocytic nevus, left abdomen, s/p bx 4/27/21  7. Verruca - OTC SA product     FHx: melanoma - mother    NAME OF PROCEDURE: Excision intermediate layered linear closure  Staff surgeon: Flora Live PA-C  Scrub Nurse: Yancy Chairez LPN    PRE-OPERATIVE DIAGNOSIS:  Dysplastic nevus  POST-OPERATIVE DIAGNOSIS: Same   FINAL EXCISION SIZE(DEFECT SIZE): 0.4 cm, with 5 mm margin   FINAL REPAIR LENGTH: 4 cm     INDICATIONS: This patient presented with a 4 mm Dysplastic nevus of the Mid paraspinal back. Excision was indicated. We discussed the principles of treatment and most likely complications including scarring, bleeding, infection, incomplete excision, wound dehiscence, pain, nerve damage, and recurrence. Informed consent was obtained and the patient underwent the procedure as follows:    PROCEDURE: The patient was taken to the operative suite. Time-out was performed.  The treatment area was anesthetized with 1% lidocaine with epinephrine. The area was prepped with Chlorhexidine and rinsed with sterile  saline and draped with sterile towels. The lesion was delineated and excised down to subcutaneous fat in a elliptical manner. Hemostasis was obtained by electrocoagulation.     REPAIR: An intermediate layered linear closure was selected as the procedure which would maximally preserve both function and cosmesis.    After the excision of the tumor, the area was carefully undermined. Hemostasis was obtained with  electrocoagulation.  Closure was oriented so that the wound was in the patient's natural skin tension lines. The subcutaneous and dermal layers were then closed with 4-0  monocryl sutures. The epidermis was then carefully approximated along the length of the wound using 4-0 prolene simple running sutures.     The final wound length was 4 cm. A total of 2 ml of anesthesia was administered for all surgical sites. Estimated blood loss was less than 10 ml for all surgical sites. A sterile pressure dressing was applied and wound care instructions, with a written handout, were given. The patient was discharged from the [facility] alert and ambulatory.    Follow-up in 2 weeks for suture removal.     Anatomic Pathology Results: pending    Staff Involved:  Staff Only    Scribe Disclosure:   I, REYNA KINNEY, am serving as a scribe; to document services personally performed by Flora Live PA-C -based on data collection and the provider's statements to me.     Provider Disclosure:  I agree with above History, Review of Systems, Physical exam and Plan.  I have reviewed the content of the documentation and have edited it as needed. I have personally performed the services documented here and the documentation accurately represents those services and the decisions I have made.      Electronically signed by:    All risks, benefits and alternatives were discussed with patient.  Patient is in agreement and understands the assessment and plan.  All questions were answered.    Flora Live PA-C,  MPAS  [facility] [place]: Phone: [phone], Fax: [phone]  [facility]: Phone: [phone],  Fax: [phone]  [facility]: Phone: [phone], Fax: [phone]                      Again, thank you for allowing me to participate in the care of your patient.        Sincerely,        Flora Live PA-C

## 2024-08-21 NOTE — PATIENT INSTRUCTIONS
Caring for your skin after surgery    After your surgery, a pressure bandage will be placed over the area. This will prevent bleeding. Please follow these instructions over the next 1 to 2 weeks. Following this regimen will help to prevent complications as your wound heals.     For the first 48 hours after your surgery:    Leave the pressure dressing on and keep it dry. If it should come loose, you may re-tape it, but do not take it off.  Relax and take it easy. Do not do any vigorous exercise, heavy lifting or bending forward. This could cause the wound to bleed.  Post-operative pain is usually mild. You may alternate between 1000 mg of Tylenol (acetaminophen) and 400 mg of Ibuprofen every 4 hours.  Do not take more than 4,000 mg of acetaminophen in a 24-hour period or 3200 mg of Ibuprofen in a 24-hr period.  Avoid alcohol and vitamin E as these may increase your tendency to bleed.  You may put an ice pack around the bandaged area for 20 minutes at a time as needed. This may help reduce swelling, bruising, and pain. Make sure the ice pack is waterproof so that the pressure bandage doesn't get wet.  You may see a small amount of drainage or blood on your pressure bandage. This is normal. However:  If drainage or bleeding continues or saturates the bandage, you will need to apply firm pressure over the bandage with a clean washcloth for 15 minutes.  If bleeding continues after applying pressure for 15 minutes, apply an ice pack with gentle pressure to the bandaged area for another 15 minutes.  If bleeding still continues, call our office or go to the nearest emergency room.    48 Hours After Surgery:    Remove outer white bandage down to clear plastic film (Tegaderm).  You may notice dark brown, dried blood under the Tegaderm.  This is normal.    Leave the clear plastic film (Tegaderm) on for up to 2 weeks, as long as it is intact.  If it falls off prior to 2 weeks follow daily wound care below.  If it stays intact  for the full 2 weeks, then remove and treat as normal, healthy skin.    Daily Wound Care (if Tegaderm and Steri-Strips fall off prior to 2 weeks):    Wash wound with a mild soap and water.  Use caution when washing the wound, be gentle and do not let the forceful shower stream hit the wound directly. DO NOT WASH WITH HYDROGEN PEROXIDE AS THIS MIGHT CAUSE THE STITCHES TO DISSOLVE FASTER THAN WHAT WE WANT.    Pat dry.    Apply Vaseline (from a new container or tube) over the suture line with a Q-tip until it is completely healed. It is very important to keep the wound continuously moist, as wounds heal best in a moist environment.    Keep the site covered until it's healed.  You can cover it with a Telfa (non-stick) dressing and tape or a band-aid until healed with normal, healthy skin.      Call Us If:    You have bleeding that will not stop after applying pressure and ice.  You have pain that is not controlled with Tylenol and Ibuprofen.  You have signs or symptoms of an infection such as fever over 100 degrees Fahrenheit, redness, warmth or foul-smelling drainage from the wound    [facility]: [phone]   [facility]: [phone]  For urgent needs outside of business hours call the [facility] at [phone] and ask to speak with the dermatology resident on call

## 2024-08-21 NOTE — NURSING NOTE
Mastisol, Steri-Strips, Tegaderm and pressure dressing applied to excision site on mid paraspinal back.  Wound care instructions reviewed with patient and AVS provided.  Patient verbalized understanding.  Patient will follow up for suture removal: Yes on September 4th.  No further questions or concerns at this time.    Yancy Chairez LPN on 8/21/2024 at 5:44 PM        The following medication was given:     MEDICATION:  Lidocaine with epinephrine 1% [id number]  ROUTE: SQ  SITE: see procedure note  DOSE: 6mL   LOT #: [id number]  : [employer]  EXPIRATION DATE: 05-  NDC#: 09537-247-91  Was there drug waste? no  Multi-dose vial: Yes    Yancy Chairez LPN  August 21, 2024

## 2024-08-21 NOTE — NURSING NOTE
Mary Oakley's goals for this visit include:   Chief Complaint   Patient presents with    Procedure     Excision Severe DN - mid paraspinal back  checklist completed          She requests these members of her care team be copied on today's visit information:     PCP: Sam[facility] - [facility]    Referring Provider:  Referred Self, MD  No address on file    /78   Pulse 90   SpO2 99%     Do you need any medication refills at today's visit?     Yancy Chairez LPN on 8/21/2024 at 4:31 PM

## 2024-08-21 NOTE — PROGRESS NOTES
DERMATOLOGY EXCISION PROCEDURE NOTE    Dermatology Problem List:  1. Halo nevus   -Right arm pigmented macule with depigmented halo s/p biopsy 1/2/2018   2. Atypical nevus   - Moderate to severe, Mid paraspinal back, bx proven on 4/17/24, S/p Excision on 8/21/24  - Mild atypia, mid back s/p biopsy 8/8/2016 s/p excision 5/22/2023 (repigmentation)  3. Eczematous dermatitis with fungal elements, right thigh  -s/p terbinafine cream initiated 4/30/2015  4. Hypertrophic scar, neck  -s/p ILK  5. Lichenification of the right ankle  - Current tx: Lidex BID x 4 weeks  6. Benign biopsy  - Lentiginous junctional melanocytic nevus, right antecubital fossa - s/p bx 4/27/21  - Lentiginous compound melanocytic nevus, left abdomen, s/p bx 4/27/21  7. Verruca - OTC SA product     FHx: melanoma - mother    NAME OF PROCEDURE: Excision intermediate layered linear closure  Staff surgeon: Flora Live PA-C  Scrub Nurse: Yancy Chairez LPN    PRE-OPERATIVE DIAGNOSIS:  Dysplastic nevus  POST-OPERATIVE DIAGNOSIS: Same   FINAL EXCISION SIZE(DEFECT SIZE): 0.4 cm, with 5 mm margin   FINAL REPAIR LENGTH: 4 cm     INDICATIONS: This patient presented with a 4 mm Dysplastic nevus of the Mid paraspinal back. Excision was indicated. We discussed the principles of treatment and most likely complications including scarring, bleeding, infection, incomplete excision, wound dehiscence, pain, nerve damage, and recurrence. Informed consent was obtained and the patient underwent the procedure as follows:    PROCEDURE: The patient was taken to the operative suite. Time-out was performed.  The treatment area was anesthetized with 1% lidocaine with epinephrine. The area was prepped with Chlorhexidine and rinsed with sterile saline and draped with sterile towels. The lesion was delineated and excised down to subcutaneous fat in a elliptical manner. Hemostasis was obtained by electrocoagulation.     REPAIR: An intermediate layered linear closure was selected  as the procedure which would maximally preserve both function and cosmesis.    After the excision of the tumor, the area was carefully undermined. Hemostasis was obtained with  electrocoagulation.  Closure was oriented so that the wound was in the patient's natural skin tension lines. The subcutaneous and dermal layers were then closed with 4-0  monocryl sutures. The epidermis was then carefully approximated along the length of the wound using 4-0 prolene simple running sutures.     The final wound length was 4 cm. A total of 2 ml of anesthesia was administered for all surgical sites. Estimated blood loss was less than 10 ml for all surgical sites. A sterile pressure dressing was applied and wound care instructions, with a written handout, were given. The patient was discharged from the [facility] alert and ambulatory.    Follow-up in 2 weeks for suture removal.     Anatomic Pathology Results: pending    Staff Involved:  Staff Only    Scribe Disclosure:   I, REYNA KINNEY, am serving as a scribe; to document services personally performed by Flora Live PA-C -based on data collection and the provider's statements to me.     Provider Disclosure:  I agree with above History, Review of Systems, Physical exam and Plan.  I have reviewed the content of the documentation and have edited it as needed. I have personally performed the services documented here and the documentation accurately represents those services and the decisions I have made.      Electronically signed by:    All risks, benefits and alternatives were discussed with patient.  Patient is in agreement and understands the assessment and plan.  All questions were answered.    Flora Live PA-C, [facility]  [facility] [place]: Phone: [phone], Fax: [phone]  [facility]: Phone: [phone],  Fax: [phone]  [facility]: Phone:  [phone], Fax: [phone]

## 2024-08-26 ENCOUNTER — TELEPHONE (OUTPATIENT)
Dept: DERMATOLOGY | Facility: CLINIC | Age: 39
End: 2024-08-26
Payer: COMMERCIAL

## 2024-08-26 NOTE — TELEPHONE ENCOUNTER
[facility] Call Center    Phone Message    May a detailed message be left on voicemail: yes     Reason for Call: Other: pt is asking to have the nurse only suture removal rescheduled, currently scheduled for 09/04     Action Taken: Message routed to:  [facility]    Travel Screening: Not Applicable     Date of Service:

## 2024-09-04 ENCOUNTER — ALLIED HEALTH/NURSE VISIT (OUTPATIENT)
Dept: DERMATOLOGY | Facility: CLINIC | Age: 39
End: 2024-09-04
Payer: COMMERCIAL

## 2024-09-04 DIAGNOSIS — D23.5 DYSPLASTIC NEVUS OF TRUNK: Primary | ICD-10-CM

## 2024-09-04 PROCEDURE — 99207 PR NO CHARGE NURSE ONLY: CPT | Performed by: PHYSICIAN ASSISTANT

## 2024-09-04 ASSESSMENT — PAIN SCALES - GENERAL: PAINLEVEL: NO PAIN (0)

## 2024-09-04 NOTE — NURSING NOTE
Mary Oakley comes into clinic today at the request of Flora Live Ordering Provider for suture removal.      Dermatology Suture Removal Record  S: Mary presents to the clinic today for  removal of sutures.  The patient has had the sutures in place for 14 days.    There has been no history of infection or drainage.    O: 2 sutures are seen located on the mid paraspinal back.  The wound is healing well with no signs of infection. Pt denied drainage, increased pain, swelling, no fever, or chills.      A: Suture removal.    P:  All sutures were easily removed today.  Routine wound care discussed.  The patient will follow up as needed.     This service provided today was under the supervising provider of the day Flora Live, who was available if needed.    Lizet Chavarria RN

## 2024-10-07 ENCOUNTER — MYC MEDICAL ADVICE (OUTPATIENT)
Dept: DERMATOLOGY | Facility: CLINIC | Age: 39
End: 2024-10-07
Payer: COMMERCIAL

## 2024-10-18 ENCOUNTER — OFFICE VISIT (OUTPATIENT)
Dept: FAMILY MEDICINE | Facility: CLINIC | Age: 39
End: 2024-10-18
Attending: FAMILY MEDICINE
Payer: COMMERCIAL

## 2024-10-18 VITALS
WEIGHT: 161 LBS | HEART RATE: 76 BPM | TEMPERATURE: 99 F | HEIGHT: 68 IN | SYSTOLIC BLOOD PRESSURE: 110 MMHG | BODY MASS INDEX: 24.4 KG/M2 | RESPIRATION RATE: 12 BRPM | OXYGEN SATURATION: 100 % | DIASTOLIC BLOOD PRESSURE: 76 MMHG

## 2024-10-18 DIAGNOSIS — F41.1 GAD (GENERALIZED ANXIETY DISORDER): ICD-10-CM

## 2024-10-18 DIAGNOSIS — M79.662 PAIN OF LEFT LOWER LEG: ICD-10-CM

## 2024-10-18 DIAGNOSIS — Z13.220 LIPID SCREENING: ICD-10-CM

## 2024-10-18 DIAGNOSIS — Z12.31 VISIT FOR SCREENING MAMMOGRAM: ICD-10-CM

## 2024-10-18 DIAGNOSIS — Z13.1 SCREENING FOR DIABETES MELLITUS: ICD-10-CM

## 2024-10-18 DIAGNOSIS — Z00.00 ROUTINE GENERAL MEDICAL EXAMINATION AT A HEALTH CARE FACILITY: Primary | ICD-10-CM

## 2024-10-18 LAB
ANION GAP SERPL CALCULATED.3IONS-SCNC: 10 MMOL/L (ref 7–15)
BUN SERPL-MCNC: 11.5 MG/DL (ref 6–20)
CALCIUM SERPL-MCNC: 9.6 MG/DL (ref 8.8–10.4)
CHLORIDE SERPL-SCNC: 104 MMOL/L (ref 98–107)
CHOLEST SERPL-MCNC: 189 MG/DL
CREAT SERPL-MCNC: 0.75 MG/DL (ref 0.51–0.95)
EGFRCR SERPLBLD CKD-EPI 2021: >90 ML/MIN/1.73M2
FASTING STATUS PATIENT QL REPORTED: ABNORMAL
FASTING STATUS PATIENT QL REPORTED: NORMAL
GLUCOSE SERPL-MCNC: 89 MG/DL (ref 70–99)
HCO3 SERPL-SCNC: 24 MMOL/L (ref 22–29)
HDLC SERPL-MCNC: 62 MG/DL
LDLC SERPL CALC-MCNC: 117 MG/DL
NONHDLC SERPL-MCNC: 127 MG/DL
POTASSIUM SERPL-SCNC: 4.3 MMOL/L (ref 3.4–5.3)
SODIUM SERPL-SCNC: 138 MMOL/L (ref 135–145)
TRIGL SERPL-MCNC: 51 MG/DL

## 2024-10-18 PROCEDURE — 80048 BASIC METABOLIC PNL TOTAL CA: CPT | Performed by: FAMILY MEDICINE

## 2024-10-18 PROCEDURE — 99214 OFFICE O/P EST MOD 30 MIN: CPT | Mod: 25 | Performed by: FAMILY MEDICINE

## 2024-10-18 PROCEDURE — 36415 COLL VENOUS BLD VENIPUNCTURE: CPT | Performed by: FAMILY MEDICINE

## 2024-10-18 PROCEDURE — 80061 LIPID PANEL: CPT | Performed by: FAMILY MEDICINE

## 2024-10-18 PROCEDURE — 99396 PREV VISIT EST AGE 40-64: CPT | Performed by: FAMILY MEDICINE

## 2024-10-18 RX ORDER — ESCITALOPRAM OXALATE 10 MG/1
10 TABLET ORAL DAILY
COMMUNITY

## 2024-10-18 SDOH — HEALTH STABILITY: PHYSICAL HEALTH: ON AVERAGE, HOW MANY DAYS PER WEEK DO YOU ENGAGE IN MODERATE TO STRENUOUS EXERCISE (LIKE A BRISK WALK)?: 1 DAY

## 2024-10-18 SDOH — HEALTH STABILITY: PHYSICAL HEALTH: ON AVERAGE, HOW MANY MINUTES DO YOU ENGAGE IN EXERCISE AT THIS LEVEL?: 50 MIN

## 2024-10-18 ASSESSMENT — ANXIETY QUESTIONNAIRES
8. IF YOU CHECKED OFF ANY PROBLEMS, HOW DIFFICULT HAVE THESE MADE IT FOR YOU TO DO YOUR WORK, TAKE CARE OF THINGS AT HOME, OR GET ALONG WITH OTHER PEOPLE?: SOMEWHAT DIFFICULT
GAD7 TOTAL SCORE: 5
3. WORRYING TOO MUCH ABOUT DIFFERENT THINGS: SEVERAL DAYS
IF YOU CHECKED OFF ANY PROBLEMS ON THIS QUESTIONNAIRE, HOW DIFFICULT HAVE THESE PROBLEMS MADE IT FOR YOU TO DO YOUR WORK, TAKE CARE OF THINGS AT HOME, OR GET ALONG WITH OTHER PEOPLE: SOMEWHAT DIFFICULT
7. FEELING AFRAID AS IF SOMETHING AWFUL MIGHT HAPPEN: SEVERAL DAYS
6. BECOMING EASILY ANNOYED OR IRRITABLE: SEVERAL DAYS
1. FEELING NERVOUS, ANXIOUS, OR ON EDGE: SEVERAL DAYS
5. BEING SO RESTLESS THAT IT IS HARD TO SIT STILL: NOT AT ALL
4. TROUBLE RELAXING: NOT AT ALL
GAD7 TOTAL SCORE: 5
7. FEELING AFRAID AS IF SOMETHING AWFUL MIGHT HAPPEN: SEVERAL DAYS
2. NOT BEING ABLE TO STOP OR CONTROL WORRYING: SEVERAL DAYS
GAD7 TOTAL SCORE: 5

## 2024-10-18 ASSESSMENT — PAIN SCALES - GENERAL: PAINLEVEL: NO PAIN (0)

## 2024-10-18 ASSESSMENT — PATIENT HEALTH QUESTIONNAIRE - PHQ9
SUM OF ALL RESPONSES TO PHQ QUESTIONS 1-9: 5
SUM OF ALL RESPONSES TO PHQ QUESTIONS 1-9: 5
10. IF YOU CHECKED OFF ANY PROBLEMS, HOW DIFFICULT HAVE THESE PROBLEMS MADE IT FOR YOU TO DO YOUR WORK, TAKE CARE OF THINGS AT HOME, OR GET ALONG WITH OTHER PEOPLE: SOMEWHAT DIFFICULT

## 2024-10-18 NOTE — PROGRESS NOTES
Preventive Care Visit  [facility] ES Jeong MD, Family Medicine  Oct 18, 2024      Assessment & Plan     Routine general medical examination at a health care facility  See counseling messages     Pain of left lower leg  Tenderness over the tendon. Has ache off and on.   No injury. Normal movement.   Recommend ice 2-3 times per day as needed. 5-10 minutes at a time.   Recommend stretching.   Be aware of how she is sitting - crosslegged and how she is in her chair working at home.   Recheck if fails to improve or if worsening in any way.   - OFFICE/OUTPT VISIT,EST,LEVL IV    MARIAELENA (generalized anxiety disorder)  Patient would like to trial the lexapro given to her back in February. Never did start it.   Discussed side effects.   Recommend reaching out in one month. In person, virtual or evisit.   Patient plans evisit.   Reach out sooner if needed.   - OFFICE/OUTPT VISIT,EST,LEVL IV    Visit for screening mammogram  Will notify of results.   - MA Screening Bilateral w/ Zafar; Future    Lipid screening  Will notify of results.   - Lipid panel reflex to direct LDL Fasting; Future  - Lipid panel reflex to direct LDL Fasting    Screening for diabetes mellitus  Will notify of results.   - Basic metabolic panel  (Ca, Cl, CO2, Creat, Gluc, K, Na, BUN); Future  - Basic metabolic panel  (Ca, Cl, CO2, Creat, Gluc, K, Na, BUN)            Counseling  Appropriate preventive services were addressed with this patient via screening, questionnaire, or discussion as appropriate for fall prevention, nutrition, physical activity, Tobacco-use cessation, social engagement, weight loss and cognition.  Checklist reviewing preventive services available has been given to the patient.  Reviewed patient's diet, addressing concerns and/or questions.   She is at risk for lack of exercise and has been provided with information to increase physical activity for the benefit of her well-being.   The patient's PHQ-9 score is  consistent with mild depression. She was provided with information regarding depression.           Khoa Hernandez is a 39 year old, presenting for the following:  Physical        10/18/2024     1:39 PM   Additional Questions   Roomed by BT   Accompanied by self        Health Care Directive  Patient does not have a Health Care Directive or Living Will: Discussed advance care planning with patient; however, patient declined at this time.    HPI    Discomfort behind left knee. 6 months. Comes and goes. Feels better with activity. With sitting and going to standing. No swelling or burning.     Couple times work up in the night work with head pressure and 'had to move'. Nothing recent. No other concerns or incidents at other times.       Anxiety   How are you doing with your anxiety since your last visit? No change  Are you having other symptoms that might be associated with anxiety? No  Have you had a significant life event? No   Are you feeling depressed? No  Do you have any concerns with your use of alcohol or other drugs? No    Did not start the lexapro. Would consider as she feels she is worried often.     Social History     Tobacco Use    Smoking status: Never    Smokeless tobacco: Never   Vaping Use    Vaping status: Never Used   Substance Use Topics    Alcohol use: Yes     Comment: Social Drinker    Drug use: No         3/15/2019     8:34 AM 2/1/2024     9:34 AM 10/18/2024    10:08 AM   MARIAELENA-7 SCORE   Total Score  11 (moderate anxiety) 5 (mild anxiety)   Total Score 3 11 5         4/23/2021     2:06 PM 2/1/2024    11:56 AM 10/18/2024    10:03 AM   PHQ   PHQ-9 Total Score 1 6 5   Q9: Thoughts of better off dead/self-harm past 2 weeks Not at all Not at all Not at all             10/18/2024   General Health   How would you rate your overall physical health? Good   Feel stress (tense, anxious, or unable to sleep) To some extent      (!) STRESS CONCERN      10/18/2024   Nutrition   Three or more servings of  calcium each day? Yes   Diet: Regular (no restrictions)   How many servings of fruit and vegetables per day? (!) 0-1   How many sweetened beverages each day? 0-1            10/18/2024   Exercise   Days per week of moderate/strenous exercise 1 day   Average minutes spent exercising at this level 50 min      (!) EXERCISE CONCERN      10/18/2024   Social Factors   Worry food won't last until get money to buy more No   Food not last or not have enough money for food? No   Do you have housing? (Housing is defined as stable permanent housing and does not include staying ouside in a car, in a tent, in an abandoned building, in an overnight shelter, or couch-surfing.) Yes   Are you worried about losing your housing? No   Lack of transportation? No   Unable to get utilities (heat,electricity)? No            10/18/2024   Dental   Dentist two times every year? Yes            10/18/2024   TB Screening   Were you born outside of the [place]? No          Today's PHQ-9 Score:       10/18/2024    10:03 AM   PHQ-9 SCORE   PHQ-9 Total Score MyChart 5 (Mild depression)   PHQ-9 Total Score 5         10/18/2024   Substance Use   Alcohol more than 3/day or more than 7/wk No   Do you use any other substances recreationally? No        Social History     Tobacco Use    Smoking status: Never    Smokeless tobacco: Never   Vaping Use    Vaping status: Never Used   Substance Use Topics    Alcohol use: Yes     Comment: Social Drinker    Drug use: No          Mammogram Screening - Mammogram every 1-2 years updated in Health Maintenance based on mutual decision making        10/18/2024   STI Screening   New sexual partner(s) since last STI/HIV test? No        History of abnormal Pap smear: No - age 30-64 HPV with reflex Pap every 5 years recommended        Latest Ref Rng & Units 6/13/2022    11:42 AM 4/23/2021     1:39 PM 4/23/2021     1:30 PM   PAP / HPV   PAP  Negative for Intraepithelial Lesion or Malignancy (NILM)      PAP (Historical)   ASC-US    "  HPV 16 DNA Negative Negative   Negative    HPV 18 DNA Negative Negative   Negative    Other HR HPV Negative Negative   Negative      ASCVD Risk   The 10-year ASCVD risk score (Dhruv CARCAMO, et al., 2019) is: 0.3%    Values used to calculate the score:      Age: 39 years      Sex: Female      Is Non- : No      Diabetic: No      Tobacco smoker: No      Systolic Blood Pressure: 110 mmHg      Is BP treated: No      HDL Cholesterol: 64 mg/dL      Total Cholesterol: 198 mg/dL        10/18/2024   Contraception/Family Planning   Questions about contraception or family planning No           Reviewed and updated as needed this visit by Provider                    BP Readings from Last 3 Encounters:   10/18/24 110/76   08/21/24 121/78   02/01/24 118/80    Wt Readings from Last 3 Encounters:   10/18/24 73 kg (161 lb)   02/01/24 73.4 kg (161 lb 14.4 oz)   08/22/23 73.9 kg (163 lb)                         Objective    Exam  /76   Pulse 76   Temp 99  F (37.2  C) (Temporal)   Resp 12   Ht 1.727 m (5' 8\")   Wt 73 kg (161 lb)   LMP 09/26/2024 (Exact Date)   SpO2 100%   BMI 24.48 kg/m     Estimated body mass index is 24.48 kg/m  as calculated from the following:    Height as of this encounter: 1.727 m (5' 8\").    Weight as of this encounter: 73 kg (161 lb).    Physical Exam  GENERAL: alert and no distress  EYES: Eyes grossly normal to inspection, PERRL and conjunctivae and sclerae normal  HENT: ear canals and TM's normal, nose and mouth without ulcers or lesions  NECK: no adenopathy, no asymmetry, masses, or scars  RESP: lungs clear to auscultation - no rales, rhonchi or wheezes  BREAST: normal without masses, tenderness or nipple discharge and no palpable axillary masses or adenopathy  CV: regular rate and rhythm, normal S1 S2, no S3 or S4, no murmur, click or rub, no peripheral edema  ABDOMEN: soft, nontender, no hepatosplenomegaly, no masses and bowel sounds normal  MS: left posterior " knee - small visible vein. Tenderness over quadriceps tendon. No erythema or edema.   SKIN: no suspicious lesions or rashes  NEURO: Normal strength and tone, mentation intact and speech normal  PSYCH: mentation appears normal, affect normal/bright        Signed Electronically by: Kaykay Jeong MD    Answers submitted by the patient for this visit:  Patient Health Questionnaire (Submitted on 10/18/2024)  If you checked off any problems, how difficult have these problems made it for you to do your work, take care of things at home, or get along with other people?: Somewhat difficult  PHQ9 TOTAL SCORE: 5  Patient Health Questionnaire (G7) (Submitted on 10/18/2024)  MARIAELENA 7 TOTAL SCORE: 5

## 2024-10-18 NOTE — PATIENT INSTRUCTIONS
Patient Education   Preventive Care Advice   This is general advice given by our system to help you stay healthy. However, your care team may have specific advice just for you. Please talk to your care team about your preventive care needs.  Nutrition  Eat 5 or more servings of fruits and vegetables each day.  Try wheat bread, brown rice and whole grain pasta (instead of white bread, rice, and pasta).  Get enough calcium and vitamin D. Check the label on foods and aim for 100% of the RDA (recommended daily allowance).  Lifestyle  Exercise at least 150 minutes each week  (30 minutes a day, 5 days a week).  Do muscle strengthening activities 2 days a week. These help control your weight and prevent disease.  No smoking.  Wear sunscreen to prevent skin cancer.  Have a dental exam and cleaning every 6 months.  Yearly exams  See your health care team every year to talk about:  Any changes in your health.  Any medicines your care team has prescribed.  Preventive care, family planning, and ways to prevent chronic diseases.  Shots (vaccines)   HPV shots (up to age 26), if you've never had them before.  Hepatitis B shots (up to age 59), if you've never had them before.  COVID-19 shot: Get this shot when it's due.  Flu shot: Get a flu shot every year.  Tetanus shot: Get a tetanus shot every 10 years.  Pneumococcal, hepatitis A, and RSV shots: Ask your care team if you need these based on your risk.  Shingles shot (for age 50 and up)  General health tests  Diabetes screening:  Starting at age 35, Get screened for diabetes at least every 3 years.  If you are younger than age 35, ask your care team if you should be screened for diabetes.  Cholesterol test: At age 39, start having a cholesterol test every 5 years, or more often if advised.  Bone density scan (DEXA): At age 50, ask your care team if you should have this scan for osteoporosis (brittle bones).  Hepatitis C: Get tested at least once in your life.  STIs (sexually  transmitted infections)  Before age 24: Ask your care team if you should be screened for STIs.  After age 24: Get screened for STIs if you're at risk. You are at risk for STIs (including HIV) if:  You are sexually active with more than one person.  You don't use condoms every time.  You or a partner was diagnosed with a sexually transmitted infection.  If you are at risk for HIV, ask about PrEP medicine to prevent HIV.  Get tested for HIV at least once in your life, whether you are at risk for HIV or not.  Cancer screening tests  Cervical cancer screening: If you have a cervix, begin getting regular cervical cancer screening tests starting at age 21.  Breast cancer scan (mammogram): If you've ever had breasts, begin having regular mammograms starting at age 40. This is a scan to check for breast cancer.  Colon cancer screening: It is important to start screening for colon cancer at age 45.  Have a colonoscopy test every 10 years (or more often if you're at risk) Or, ask your provider about stool tests like a FIT test every year or Cologuard test every 3 years.  To learn more about your testing options, visit:   .  For help making a decision, visit:   https://bit.ly/ay48175.  Prostate cancer screening test: If you have a prostate, ask your care team if a prostate cancer screening test (PSA) at age 55 is right for you.  Lung cancer screening: If you are a current or former smoker ages 50 to 80, ask your care team if ongoing lung cancer screenings are right for you.  For informational purposes only. Not to replace the advice of your health care provider. Copyright   2023 [facility]. All rights reserved. Clinically reviewed by the [facility]. [employer] 111154 - REV 01/24.  Learning About Depression Screening  What is depression screening?  Depression screening is a way to see if you have depression symptoms. It may be done by a doctor or counselor. It's often part of a routine  "checkup. That's because your mental health is just as important as your physical health.  Depression is a mental health condition that affects how you feel, think, and act. You may:  Have less energy.  Lose interest in your daily activities.  Feel sad and grouchy for a long time.  Depression is very common. It affects people of all ages.  Many things can lead to depression. Some people become depressed after they have a stroke or find out they have a major illness like cancer or heart disease. The death of a loved one or a breakup may lead to depression. It can run in families. Most experts believe that a combination of inherited genes and stressful life events can cause it.  What happens during screening?  You may be asked to fill out a form about your depression symptoms. You and the doctor will discuss your answers. The doctor may ask you more questions to learn more about how you think, act, and feel.  What happens after screening?  If you have symptoms of depression, your doctor will talk to you about your options.  Doctors usually treat depression with medicines or counseling. Often, combining the two works best. Many people don't get help because they think that they'll get over the depression on their own. But people with depression may not get better unless they get treatment.  The cause of depression is not well understood. There may be many factors involved. But if you have depression, it's not your fault.  A serious symptom of depression is thinking about death or suicide. If you or someone you care about talks about this or about feeling hopeless, get help right away.  It's important to know that depression can be treated. Medicine, counseling, and self-care may help.  Where can you learn more?  Go to https://www.[employer].net/patiented  Enter T185 in the search box to learn more about \"Learning About Depression Screening.\"  Current as of: June 24, 2023  Content Version: 14.2 2024 [employer].   Care instructions adapted under license by your healthcare professional. If you have questions about a medical condition or this instruction, always ask your healthcare professional. [employer] disclaims any warranty or liability for your use of this information.

## 2024-10-21 ENCOUNTER — PATIENT OUTREACH (OUTPATIENT)
Dept: CARE COORDINATION | Facility: CLINIC | Age: 39
End: 2024-10-21
Payer: COMMERCIAL

## 2024-10-23 ENCOUNTER — MYC MEDICAL ADVICE (OUTPATIENT)
Dept: FAMILY MEDICINE | Facility: CLINIC | Age: 39
End: 2024-10-23
Payer: COMMERCIAL

## 2024-11-04 ENCOUNTER — ALLIED HEALTH/NURSE VISIT (OUTPATIENT)
Dept: FAMILY MEDICINE | Facility: OTHER | Age: 39
End: 2024-11-04
Payer: COMMERCIAL

## 2024-11-04 DIAGNOSIS — Z23 NEED FOR PROPHYLACTIC VACCINATION AND INOCULATION AGAINST INFLUENZA: Primary | ICD-10-CM

## 2024-11-04 PROCEDURE — 90656 IIV3 VACC NO PRSV 0.5 ML IM: CPT

## 2024-11-04 PROCEDURE — 90471 IMMUNIZATION ADMIN: CPT

## 2024-11-04 PROCEDURE — 99207 PR NO CHARGE NURSE ONLY: CPT

## 2024-11-13 ENCOUNTER — ANCILLARY PROCEDURE (OUTPATIENT)
Dept: MAMMOGRAPHY | Facility: OTHER | Age: 39
End: 2024-11-13
Attending: FAMILY MEDICINE
Payer: COMMERCIAL

## 2024-11-13 DIAGNOSIS — Z12.31 VISIT FOR SCREENING MAMMOGRAM: ICD-10-CM

## 2024-11-13 PROCEDURE — 77067 SCR MAMMO BI INCL CAD: CPT | Mod: TC | Performed by: RADIOLOGY

## 2024-11-13 PROCEDURE — 77063 BREAST TOMOSYNTHESIS BI: CPT | Mod: TC | Performed by: RADIOLOGY

## 2024-11-20 ENCOUNTER — MYC REFILL (OUTPATIENT)
Dept: FAMILY MEDICINE | Facility: CLINIC | Age: 39
End: 2024-11-20
Payer: COMMERCIAL

## 2024-11-20 DIAGNOSIS — F41.1 GAD (GENERALIZED ANXIETY DISORDER): Primary | ICD-10-CM

## 2024-11-20 RX ORDER — ESCITALOPRAM OXALATE 10 MG/1
10 TABLET ORAL DAILY
Qty: 90 TABLET | Refills: 0 | Status: SHIPPED | OUTPATIENT
Start: 2024-11-20

## 2025-02-19 ENCOUNTER — E-VISIT (OUTPATIENT)
Dept: FAMILY MEDICINE | Facility: CLINIC | Age: 40
End: 2025-02-19
Payer: COMMERCIAL

## 2025-02-19 DIAGNOSIS — F41.1 GAD (GENERALIZED ANXIETY DISORDER): ICD-10-CM

## 2025-02-19 ASSESSMENT — ANXIETY QUESTIONNAIRES
1. FEELING NERVOUS, ANXIOUS, OR ON EDGE: SEVERAL DAYS
8. IF YOU CHECKED OFF ANY PROBLEMS, HOW DIFFICULT HAVE THESE MADE IT FOR YOU TO DO YOUR WORK, TAKE CARE OF THINGS AT HOME, OR GET ALONG WITH OTHER PEOPLE?: NOT DIFFICULT AT ALL
2. NOT BEING ABLE TO STOP OR CONTROL WORRYING: SEVERAL DAYS
IF YOU CHECKED OFF ANY PROBLEMS ON THIS QUESTIONNAIRE, HOW DIFFICULT HAVE THESE PROBLEMS MADE IT FOR YOU TO DO YOUR WORK, TAKE CARE OF THINGS AT HOME, OR GET ALONG WITH OTHER PEOPLE: NOT DIFFICULT AT ALL
GAD7 TOTAL SCORE: 4
6. BECOMING EASILY ANNOYED OR IRRITABLE: NOT AT ALL
4. TROUBLE RELAXING: NOT AT ALL
5. BEING SO RESTLESS THAT IT IS HARD TO SIT STILL: NOT AT ALL
7. FEELING AFRAID AS IF SOMETHING AWFUL MIGHT HAPPEN: SEVERAL DAYS
7. FEELING AFRAID AS IF SOMETHING AWFUL MIGHT HAPPEN: SEVERAL DAYS
GAD7 TOTAL SCORE: 4
3. WORRYING TOO MUCH ABOUT DIFFERENT THINGS: SEVERAL DAYS

## 2025-02-20 RX ORDER — ESCITALOPRAM OXALATE 10 MG/1
10 TABLET ORAL DAILY
Qty: 90 TABLET | Refills: 3 | Status: SHIPPED | OUTPATIENT
Start: 2025-02-20

## 2025-02-20 NOTE — PATIENT INSTRUCTIONS
I am glad you are doing well. I have refilled your medication.     View your full visit summary for details by clicking on the link below. Your pharmacist will be able to address any questions you may have about the medication.      Thank you for choosing us for your care.    Please let me know what questions you have.    Kaykay Jeong MD

## 2025-05-08 ENCOUNTER — RESULTS FOLLOW-UP (OUTPATIENT)
Dept: DERMATOLOGY | Facility: CLINIC | Age: 40
End: 2025-05-08

## 2025-05-09 ENCOUNTER — HOSPITAL ENCOUNTER (EMERGENCY)
Facility: CLINIC | Age: 40
Discharge: HOME | End: 2025-05-09
Attending: FAMILY MEDICINE | Admitting: FAMILY MEDICINE
Payer: COMMERCIAL

## 2025-05-09 VITALS
SYSTOLIC BLOOD PRESSURE: 121 MMHG | DIASTOLIC BLOOD PRESSURE: 78 MMHG | OXYGEN SATURATION: 99 % | BODY MASS INDEX: 24.68 KG/M2 | TEMPERATURE: 98.2 F | RESPIRATION RATE: 20 BRPM | HEART RATE: 107 BPM | WEIGHT: 162.3 LBS

## 2025-05-09 DIAGNOSIS — K52.9 GASTROENTERITIS: ICD-10-CM

## 2025-05-09 LAB
ALBUMIN SERPL BCG-MCNC: 4.3 G/DL (ref 3.5–5.2)
ALP SERPL-CCNC: 95 U/L (ref 40–150)
ALT SERPL W P-5'-P-CCNC: 55 U/L (ref 0–50)
ANION GAP SERPL CALCULATED.3IONS-SCNC: 13 MMOL/L (ref 7–15)
AST SERPL W P-5'-P-CCNC: 58 U/L (ref 0–45)
BASOPHILS # BLD AUTO: 0 10E3/UL (ref 0–0.2)
BASOPHILS NFR BLD AUTO: 0 %
BILIRUB SERPL-MCNC: 0.3 MG/DL
BUN SERPL-MCNC: 9.8 MG/DL (ref 6–20)
CALCIUM SERPL-MCNC: 9.2 MG/DL (ref 8.8–10.4)
CHLORIDE SERPL-SCNC: 102 MMOL/L (ref 98–107)
CREAT SERPL-MCNC: 0.75 MG/DL (ref 0.51–0.95)
EGFRCR SERPLBLD CKD-EPI 2021: >90 ML/MIN/1.73M2
EOSINOPHIL # BLD AUTO: 0.2 10E3/UL (ref 0–0.7)
EOSINOPHIL NFR BLD AUTO: 3 %
ERYTHROCYTE [DISTWIDTH] IN BLOOD BY AUTOMATED COUNT: 12.8 % (ref 10–15)
GLUCOSE SERPL-MCNC: 154 MG/DL (ref 70–99)
HCO3 SERPL-SCNC: 19 MMOL/L (ref 22–29)
HCT VFR BLD AUTO: 40.7 % (ref 35–47)
HGB BLD-MCNC: 14.1 G/DL (ref 11.7–15.7)
IMM GRANULOCYTES # BLD: 0 10E3/UL
IMM GRANULOCYTES NFR BLD: 1 %
LYMPHOCYTES # BLD AUTO: 0.3 10E3/UL (ref 0.8–5.3)
LYMPHOCYTES NFR BLD AUTO: 5 %
MCH RBC QN AUTO: 28.4 PG (ref 26.5–33)
MCHC RBC AUTO-ENTMCNC: 34.6 G/DL (ref 31.5–36.5)
MCV RBC AUTO: 82 FL (ref 78–100)
MONOCYTES # BLD AUTO: 0.7 10E3/UL (ref 0–1.3)
MONOCYTES NFR BLD AUTO: 10 %
NEUTROPHILS # BLD AUTO: 5.3 10E3/UL (ref 1.6–8.3)
NEUTROPHILS NFR BLD AUTO: 81 %
NRBC # BLD AUTO: 0 10E3/UL
NRBC BLD AUTO-RTO: 0 /100
PLATELET # BLD AUTO: 210 10E3/UL (ref 150–450)
POTASSIUM SERPL-SCNC: 3.7 MMOL/L (ref 3.4–5.3)
PROT SERPL-MCNC: 7.3 G/DL (ref 6.4–8.3)
RBC # BLD AUTO: 4.96 10E6/UL (ref 3.8–5.2)
SODIUM SERPL-SCNC: 134 MMOL/L (ref 135–145)
WBC # BLD AUTO: 6.5 10E3/UL (ref 4–11)

## 2025-05-09 PROCEDURE — 82565 ASSAY OF CREATININE: CPT | Performed by: FAMILY MEDICINE

## 2025-05-09 PROCEDURE — 99283 EMERGENCY DEPT VISIT LOW MDM: CPT | Performed by: FAMILY MEDICINE

## 2025-05-09 PROCEDURE — 85025 COMPLETE CBC W/AUTO DIFF WBC: CPT | Performed by: FAMILY MEDICINE

## 2025-05-09 PROCEDURE — 36415 COLL VENOUS BLD VENIPUNCTURE: CPT | Performed by: FAMILY MEDICINE

## 2025-05-09 PROCEDURE — 96360 HYDRATION IV INFUSION INIT: CPT | Performed by: FAMILY MEDICINE

## 2025-05-09 PROCEDURE — 258N000003 HC RX IP 258 OP 636: Performed by: FAMILY MEDICINE

## 2025-05-09 PROCEDURE — 87507 IADNA-DNA/RNA PROBE TQ 12-25: CPT | Performed by: FAMILY MEDICINE

## 2025-05-09 RX ADMIN — SODIUM CHLORIDE 1000 ML: 0.9 INJECTION, SOLUTION INTRAVENOUS at 21:44

## 2025-05-09 ASSESSMENT — COLUMBIA-SUICIDE SEVERITY RATING SCALE - C-SSRS
6. HAVE YOU EVER DONE ANYTHING, STARTED TO DO ANYTHING, OR PREPARED TO DO ANYTHING TO END YOUR LIFE?: NO
2. HAVE YOU ACTUALLY HAD ANY THOUGHTS OF KILLING YOURSELF IN THE PAST MONTH?: NO
1. IN THE PAST MONTH, HAVE YOU WISHED YOU WERE DEAD OR WISHED YOU COULD GO TO SLEEP AND NOT WAKE UP?: NO

## 2025-05-09 ASSESSMENT — ACTIVITIES OF DAILY LIVING (ADL)
ADLS_ACUITY_SCORE: 41
ADLS_ACUITY_SCORE: 45

## 2025-05-10 ENCOUNTER — TELEPHONE (OUTPATIENT)
Dept: NURSING | Facility: CLINIC | Age: 40
End: 2025-05-10
Payer: COMMERCIAL

## 2025-05-10 DIAGNOSIS — A02.0 SALMONELLA ENTERITIS: Primary | ICD-10-CM

## 2025-05-10 LAB
ADV 40+41 DNA STL QL NAA+NON-PROBE: NEGATIVE
ASTRO TYP 1-8 RNA STL QL NAA+NON-PROBE: NEGATIVE
C CAYETANENSIS DNA STL QL NAA+NON-PROBE: NEGATIVE
CAMPYLOBACTER DNA SPEC NAA+PROBE: NEGATIVE
CRYPTOSP DNA STL QL NAA+NON-PROBE: NEGATIVE
E COLI O157 DNA STL QL NAA+NON-PROBE: ABNORMAL
E HISTOLYT DNA STL QL NAA+NON-PROBE: NEGATIVE
EAEC ASTA GENE ISLT QL NAA+PROBE: NEGATIVE
EC STX1+STX2 GENES STL QL NAA+NON-PROBE: NEGATIVE
EPEC EAE GENE STL QL NAA+NON-PROBE: NEGATIVE
ETEC LTA+ST1A+ST1B TOX ST NAA+NON-PROBE: NEGATIVE
G LAMBLIA DNA STL QL NAA+NON-PROBE: NEGATIVE
NOROVIRUS GI+II RNA STL QL NAA+NON-PROBE: NEGATIVE
P SHIGELLOIDES DNA STL QL NAA+NON-PROBE: NEGATIVE
RVA RNA STL QL NAA+NON-PROBE: POSITIVE
SALMONELLA SP RPOD STL QL NAA+PROBE: POSITIVE
SAPO I+II+IV+V RNA STL QL NAA+NON-PROBE: NEGATIVE
SHIGELLA SP+EIEC IPAH ST NAA+NON-PROBE: NEGATIVE
V CHOLERAE DNA SPEC QL NAA+PROBE: NEGATIVE
VIBRIO DNA SPEC NAA+PROBE: NEGATIVE
Y ENTEROCOL DNA STL QL NAA+PROBE: NEGATIVE

## 2025-05-10 RX ORDER — CIPROFLOXACIN 500 MG/1
500 TABLET, FILM COATED ORAL 2 TIMES DAILY
Qty: 10 TABLET | Refills: 0 | Status: SHIPPED | OUTPATIENT
Start: 2025-05-10 | End: 2025-05-15

## 2025-05-10 NOTE — ED PROVIDER NOTES
History     Chief Complaint   Patient presents with    Diarrhea     HPI  Mary Oakley is a 39 year old female who presents with a 3-day history of diarrhea and then tonight had 1 bloody stool.  This was the first time she had that.  Patient has been having 10-15 loose stools per day for the last 3 days, and it actually got a lot worse today.  Patient has had some chills and is having some bodyaches.  Denies any URI-like symptoms.  Patient went to clinic today and they told her that she likely had some type of viral enteritis and sent the patient home with some Zofran and conservative care.  She got concerned because she did have a bloody stool tonight on her discharge instructions and recommended coming to the ER right away if she noticed blood.  She states that she is feeling a little lightheaded which is worse today compared to yesterday.    Allergies:  No Known Allergies    Problem List:    Patient Active Problem List    Diagnosis Date Noted    Family history of malignant melanoma 2024     Priority: Medium    Cherry angioma 2024     Priority: Medium    Multiple benign nevi 2024     Priority: Medium    Neoplasm of uncertain behavior 2024     Priority: Medium    ASCUS of cervix with negative high risk HPV 2021     Priority: Medium     , 2010, ,  NIL paps   NIL pap, Neg HPV.  21 ASCUS pap, neg HPV. Plan cotest in 1 yr  22 NIL Pap, Neg HR HPV. Cotest in 2 years.       MARIAELENA (generalized anxiety disorder) 2017     Priority: Medium    History of  section 10/09/2015     Priority: Medium     Diagnosis updated by automated process. Provider to review and confirm.          Past Medical History:    Past Medical History:   Diagnosis Date    ASCUS of cervix with negative high risk HPV 2021    Breast mass     Lymphoid hyperplasia     Scoliosis        Past Surgical History:    Past Surgical History:   Procedure Laterality Date    COLONOSCOPY       COLONOSCOPY  4/22/2013    Procedure: COLONOSCOPY;  colonoscopy;  Surgeon: Moshe Tamez MD;  Location:  GI    ZZC NONSPECIFIC PROCEDURE  as Infant    Trigger Thumb Release       Family History:    Family History   Problem Relation Age of Onset    Melanoma Mother     Anxiety Disorder Mother     Other Cancer Mother         Skin cancer    Asthma Brother     Anxiety Disorder Brother     Melanoma Paternal Grandmother     Heart Disease Paternal Grandmother     Cardiovascular Paternal Grandmother     Cancer Paternal Grandmother         skin cancer    Alzheimer Disease Paternal Grandfather        Social History:  Marital Status:   [2]  Social History     Tobacco Use    Smoking status: Never    Smokeless tobacco: Never   Vaping Use    Vaping status: Never Used   Substance Use Topics    Alcohol use: Yes     Comment: Social Drinker    Drug use: No        Medications:    escitalopram (LEXAPRO) 10 MG tablet  salicylic acid 17 % LIQD          Review of Systems   All other systems reviewed and are negative.      Physical Exam   BP: 128/72  Pulse: 107  Temp: 98.2  F (36.8  C)  Resp: 20  Weight: 73.6 kg (162 lb 4.8 oz)  SpO2: 99 %      Physical Exam  Vitals and nursing note reviewed.   Constitutional:       General: She is not in acute distress.     Appearance: She is well-developed. She is not diaphoretic.   Eyes:      Conjunctiva/sclera: Conjunctivae normal.   Cardiovascular:      Rate and Rhythm: Normal rate and regular rhythm.      Heart sounds: Normal heart sounds. No murmur heard.     No friction rub. No gallop.   Pulmonary:      Effort: Pulmonary effort is normal. No respiratory distress.      Breath sounds: Normal breath sounds. No wheezing or rales.   Chest:      Chest wall: No tenderness.   Abdominal:      General: Bowel sounds are normal. There is no distension.      Palpations: Abdomen is soft. There is no mass.      Tenderness: There is no abdominal tenderness. There is no guarding.   Musculoskeletal:          General: No tenderness. Normal range of motion.      Cervical back: Normal range of motion and neck supple.   Skin:     General: Skin is warm and dry.      Findings: No rash.   Neurological:      Mental Status: She is alert and oriented to person, place, and time.   Psychiatric:         Judgment: Judgment normal.         ED Course        Procedures      Results for orders placed or performed during the hospital encounter of 05/09/25 (from the past 24 hours)   CBC with platelets differential    Narrative    The following orders were created for panel order CBC with platelets differential.  Procedure                               Abnormality         Status                     ---------                               -----------         ------                     CBC with platelets and ...[[id number]]  Abnormal            Final result                 Please view results for these tests on the individual orders.   Comprehensive metabolic panel   Result Value Ref Range    Sodium 134 (L) 135 - 145 mmol/L    Potassium 3.7 3.4 - 5.3 mmol/L    Carbon Dioxide (CO2) 19 (L) 22 - 29 mmol/L    Anion Gap 13 7 - 15 mmol/L    Urea Nitrogen 9.8 6.0 - 20.0 mg/dL    Creatinine 0.75 0.51 - 0.95 mg/dL    GFR Estimate >90 >60 mL/min/1.73m2    Calcium 9.2 8.8 - 10.4 mg/dL    Chloride 102 98 - 107 mmol/L    Glucose 154 (H) 70 - 99 mg/dL    Alkaline Phosphatase 95 40 - 150 U/L    AST 58 (H) 0 - 45 U/L    ALT 55 (H) 0 - 50 U/L    Protein Total 7.3 6.4 - 8.3 g/dL    Albumin 4.3 3.5 - 5.2 g/dL    Bilirubin Total 0.3 <=1.2 mg/dL   CBC with platelets and differential   Result Value Ref Range    WBC Count 6.5 4.0 - 11.0 10e3/uL    RBC Count 4.96 3.80 - 5.20 10e6/uL    Hemoglobin 14.1 11.7 - 15.7 g/dL    Hematocrit 40.7 35.0 - 47.0 %    MCV 82 78 - 100 fL    MCH 28.4 26.5 - 33.0 pg    MCHC 34.6 31.5 - 36.5 g/dL    RDW 12.8 10.0 - 15.0 %    Platelet Count 210 150 - 450 10e3/uL    % Neutrophils 81 %    % Lymphocytes 5 %    % Monocytes 10 %    %  Eosinophils 3 %    % Basophils 0 %    % Immature Granulocytes 1 %    NRBCs per 100 WBC 0 <1 /100    Absolute Neutrophils 5.3 1.6 - 8.3 10e3/uL    Absolute Lymphocytes 0.3 (L) 0.8 - 5.3 10e3/uL    Absolute Monocytes 0.7 0.0 - 1.3 10e3/uL    Absolute Eosinophils 0.2 0.0 - 0.7 10e3/uL    Absolute Basophils 0.0 0.0 - 0.2 10e3/uL    Absolute Immature Granulocytes 0.0 <=0.4 10e3/uL    Absolute NRBCs 0.0 10e3/uL       Medications   sodium chloride 0.9% BOLUS 1,000 mL (1,000 mLs Intravenous $New Bag 5/9/25 [id number])     Labs have come back and mostly unremarkable, sodium level is a little bit low, patient could have some very mild early dehydration.  She was able to give a stool sample here which I think is the most important thing.  She was concerned because she noticed blood in her stools tonight but I think this just is going along with the process that she likely has some significant colitis from what ever this infectious processes.  I told her as long she is not passing just blood and more than a cup every 2-3 hours, you can continue to watch this.  At this point think is safe to discharge her home, recommend continue to push fluids.  She should follow-up with her primary care doctor on Monday to go over the stool results.    Assessments & Plan (with Medical Decision Making)  Gastroenteritis     I have reviewed the nursing notes.    I have reviewed the findings, diagnosis, plan and need for follow up with the patient.        5/9/2025   [facility] EMERGENCY DEPT       Mike Price MD  05/09/25 [id number]

## 2025-05-10 NOTE — TELEPHONE ENCOUNTER
[facility]    Reason for call: Lab Result Notification     Lab Result (including Rx patient on, if applicable).  If culture, copy of lab report at bottom.  Lab Result: Stool Panel  Component      Latest Ref Rng 5/9/2025  10:57 PM   SALMONELLA SPECIES      Negative  Positive !    ROTAVIRUS A      Negative  Positive !       Legend:  ! Abnormal    ED Rx: None prescribed    Creatinine Level (mg/dl)   Creatinine   Date Value Ref Range Status   05/09/2025 0.75 0.51 - 0.95 mg/dL Final   04/23/2021 0.67 0.52 - 1.04 mg/dL Final    Creatinine clearance (ml/min), if applicable    Serum creatinine: 0.75 mg/dL 05/09/25 2143  Estimated creatinine clearance: 115.9 mL/min     ED Symptoms: Patient presented to [facility] ED on 5/9/2025 with 3 days of severe diarrhea including one bloody stool    RN Recommendations/Instructions per [place] ED lab result protocol:   [facility] ED lab result protocol utilized: Enteric Bacteria and Viruses Protocol  RN will consult with ED provider.    [facility] Emergency Department Provider: Alaina Garcia PA-C  Consultation Time: 1:53pm  Provider Recommendation:  Ciprofloxacin 500mg PO BID x 5 days  Patient/parent notified of Providers recommendations (YES/NO): Yes. Prescription for Ciprofloxacin 500mg PO BID x 5 days sent to [facility] in [place] per patient request    Patient's current Symptoms:   Still having 10 plus stools per day, seeing some blood in them. Able to stay hydrated, no fever    Does patient fit any of the following criteria?:     Has allergy to medications: No  Is breastfeeding: No  Is pregnant: No  On Coumadin/Warfarin: No      Patient/care giver notified to contact your PCP clinic or return to the Emergency department if your:  Symptoms worsen or other concerning symptoms.       Delaney Pierson RN

## 2025-05-11 NOTE — ED PROVIDER NOTES
I was asked to call the patient regarding her concerns for the side effects with Cipro.  Please see previous notes but briefly the patient has had 5 days of diarrhea with blood in it and had stool culture that was positive for Salmonella and rotavirus.  Per protocol the nurse reviewing the labs consulted in ED provider here yesterday, Alaina Garcia PA-C, who recommended Cipro twice daily x 5 days.  Patient was concerned about possible side effects of Cipro and wondered if she really needed to take it.      I discussed her case and findings with her.  She sounds like she does not have severe Salmonella enteritis.  The volume of the stools has decreased in the last day or 2.  She still has some ongoing blood loss but does not have orthostatic symptoms.  She is not have high fever or severe constant abdominal pain.  Her abdominal pain is relieved by bowel movement.  After discussion of options I advised her to hold off on any antibiotic treatment at this point given recommendations are to not treat this with antibiotics initially if the case is not severe.  I advised her that taking antibiotics could prolong the illness and could result in resistance.  I advised her that there were other options if she does have severe illness or gets worse.  We discussed the expected course of this illness and return to ER precautions.  I advised her that if her pain becomes constant and severe she needs to return to the ER for reevaluation.  Patient understands and agrees and will hold off on antibiotic treatment at this point.  All questions answered.    I did review previous notes and laboratory findings.  Her hemoglobin was 14 and her renal function was normal and she sounds alert and in no discomfort when abducted on the phone.     Devaughn Pryor MD  05/11/25 4023

## 2025-05-21 ENCOUNTER — NURSE TRIAGE (OUTPATIENT)
Dept: FAMILY MEDICINE | Facility: CLINIC | Age: 40
End: 2025-05-21
Payer: COMMERCIAL

## 2025-05-21 NOTE — TELEPHONE ENCOUNTER
Called patient and relayed message below. Encouraged to continue hydration, good handwashing and monitor symptoms. Reach out if new or worsening. Patient  verbalized understanding and all questions answered.     Muriel Constantino RN  [facility] - Registered Nurse  Clinic Triage [facility]   May 21, 2025

## 2025-05-21 NOTE — TELEPHONE ENCOUNTER
"    Nurse Triage SBAR    Is this a 2nd Level Triage? NO    Situation: Patient having having 2-3 very loose and \"flaky\" stools per day with bloating and abdominal cramping prior to defecation and then resolved. Started 5/7/25 and resting HR 70's.     Background: Patient was seen in UC 5/9/25 and POSITIVE for Rotovirus A and Salmenella. Noted blood in stool and seen in ED 5/9/25. Declined antibiotics.    Assessment: Denies fever, chills, N&V,abdominal pain constant, CP, SOB, dry lips/mouth, dizziness, blood or mucus in stool, increase/pounding/skipping beat heart rate or HA.    Protocol Recommended Disposition:   See in Office Within 3 Days    Huddled with Dr. Cabrera and stated unless patient would like to be seen or worsening symptoms continue HC.     Recommendation: Continue HC process and alert if worsening or new symptoms. Reviewed red flag symptoms and when to be seen in UC/ED. Patient verbalized understanding and all questions answered.     Does the patient meet one of the following criteria for ADS visit consideration? 16+ years old, with an [facility] PCP     Muriel Constantino RN  [facility] - Registered Nurse  Clinic Triage [facility]   May 21, 2025      Reason for Disposition   MILD diarrhea (e.g., 1-3 or more stools than normal in past 24 hours) diarrhea and present > 7 days  (Exception: Chronic diarrhea that is not worse.)    Additional Information   Negative: Shock suspected (e.g., cold/pale/clammy skin, too weak to stand, low BP, rapid pulse)   Negative: Difficult to awaken or acting confused (e.g., disoriented, slurred speech)   Negative: Sounds like a life-threatening emergency to the triager   Negative: SEVERE abdominal pain (e.g., excruciating) and present > 1 hour   Negative: SEVERE abdominal pain and age > 60 years   Negative: Bloody, black, or tarry bowel movements  (Exception: Chronic-unchanged black-grey bowel movements and is taking iron pills or Pepto-Bismol.)   Negative: SEVERE diarrhea (e.g., 7 or " "more times / day more than normal) and age > 60 years   Negative: Constant abdominal pain lasting > 2 hours   Negative: Drinking very little and dehydration suspected (e.g., no urine > 12 hours, very dry mouth, very lightheaded)   Negative: Patient sounds very sick or weak to the triager   Negative: SEVERE diarrhea (e.g., 7 or more times / day more than normal) and present > 24 hours (1 day)   Negative: MODERATE diarrhea (e.g., 4-6 times / day more than normal) and present > 48 hours (2 days)   Negative: MODERATE diarrhea (e.g., 4-6 times / day more than normal) and age > 70 years   Negative: Abdominal pain  (Exceptions: Pain clears completely with each passage of diarrhea stool,  or symptoms similar to previously diagnosed irritable bowel syndrome.)   Negative: Fever > 101 F (38.3 C)   Negative: Blood in the stool  (Exception: Only on toilet paper. Reason: Diarrhea can cause rectal irritation with blood on wiping.)   Negative: Mucus or pus in stool has been present > 2 days and diarrhea is more than mild   Negative: Weak immune system (e.g., HIV positive, cancer chemo, splenectomy, organ transplant, chronic steroids)   Negative: Travel to a foreign country in past month   Negative: Recent antibiotic therapy (i.e., within last 2 months) and diarrhea present > 3 days since antibiotic was stopped   Negative: Recent hospitalization and diarrhea present > 3 days   Negative: Tube feedings (e.g., nasogastric, g-tube, j-tube)    Answer Assessment - Initial Assessment Questions  1. DIARRHEA SEVERITY: \"How bad is the diarrhea?\" \"How many more stools have you had in the past 24 hours than normal?\"       2  2. ONSET: \"When did the diarrhea begin?\"       5/7/25  3. STOOL DESCRIPTION:  \"How loose or watery is the diarrhea?\" \"What is the stool color?\" \"Is there any blood or mucous in the stool?\"      Loose flaky   4. VOMITING: \"Are you also vomiting?\" If Yes, ask: \"How many times in the past 24 hours?\"       no  5. ABDOMEN PAIN: " "\"Are you having any abdomen pain?\" If Yes, ask: \"What does it feel like?\" (e.g., crampy, dull, intermittent, constant)       Bloated and cramping at time of BM and then none  6. ABDOMEN PAIN SEVERITY: If present, ask: \"How bad is the pain?\"  (e.g., Scale 1-10; mild, moderate, or severe)      mild  7. ORAL INTAKE: If vomiting, \"Have you been able to drink liquids?\" \"How much liquids have you had in the past 24 hours?\"      yes  8. HYDRATION: \"Any signs of dehydration?\" (e.g., dry mouth [not just dry lips], too weak to stand, dizziness, new weight loss) \"When did you last urinate?\"      no  9. EXPOSURE: \"Have you traveled to a foreign country recently?\" \"Have you been exposed to anyone with diarrhea?\" \"Could you have eaten any food that was spoiled?\"    no  10. ANTIBIOTIC USE: \"Are you taking antibiotics now or have you taken antibiotics in the past 2 months?\"     no  11. OTHER SYMPTOMS: \"Do you have any other symptoms?\" (e.g., fever, blood in stool)        Previous blood in yaya  12. PREGNANCY: \"Is there any chance you are pregnant?\" \"When was your last menstrual period?\"        N/a    Protocols used: Diarrhea-A-[place]    "

## 2025-05-23 ENCOUNTER — RESULTS FOLLOW-UP (OUTPATIENT)
Dept: FAMILY MEDICINE | Facility: CLINIC | Age: 40
End: 2025-05-23

## 2025-05-27 ENCOUNTER — LAB (OUTPATIENT)
Dept: LAB | Facility: CLINIC | Age: 40
End: 2025-05-27
Payer: COMMERCIAL

## 2025-05-27 ENCOUNTER — E-VISIT (OUTPATIENT)
Dept: FAMILY MEDICINE | Facility: CLINIC | Age: 40
End: 2025-05-27
Payer: COMMERCIAL

## 2025-05-27 DIAGNOSIS — R19.7 DIARRHEA OF PRESUMED INFECTIOUS ORIGIN: Primary | ICD-10-CM

## 2025-05-27 NOTE — PATIENT INSTRUCTIONS
Thank you for choosing us for your care. Given your symptoms, and prior care with Dr. Cabrera, I would like you to do a lab-only visit to determine what is causing them.  I have placed the orders.  Please schedule an appointment with the lab right here in [facility], or call [phone].  I will let you know when the results are back and next steps to take.    Schedule a Lab Only appointment here.

## 2025-05-28 ENCOUNTER — RESULTS FOLLOW-UP (OUTPATIENT)
Dept: FAMILY MEDICINE | Facility: CLINIC | Age: 40
End: 2025-05-28

## 2025-05-28 LAB

## 2025-06-04 LAB
